# Patient Record
Sex: FEMALE | Race: WHITE | NOT HISPANIC OR LATINO | Employment: FULL TIME | URBAN - METROPOLITAN AREA
[De-identification: names, ages, dates, MRNs, and addresses within clinical notes are randomized per-mention and may not be internally consistent; named-entity substitution may affect disease eponyms.]

---

## 2017-02-15 ENCOUNTER — GENERIC CONVERSION - ENCOUNTER (OUTPATIENT)
Dept: OTHER | Facility: OTHER | Age: 22
End: 2017-02-15

## 2017-04-02 ENCOUNTER — HOSPITAL ENCOUNTER (OUTPATIENT)
Facility: HOSPITAL | Age: 22
Discharge: HOME | End: 2017-04-02
Attending: OBSTETRICS & GYNECOLOGY | Admitting: OBSTETRICS & GYNECOLOGY
Payer: COMMERCIAL

## 2017-04-02 VITALS — TEMPERATURE: 98 F | RESPIRATION RATE: 16 BRPM

## 2017-04-02 DIAGNOSIS — Z3A.26 26 WEEKS GESTATION OF PREGNANCY: ICD-10-CM

## 2017-04-02 PROCEDURE — 99202 OFFICE O/P NEW SF 15 MIN: CPT

## 2017-04-02 RX ORDER — DIPHENOXYLATE HYDROCHLORIDE AND ATROPINE SULFATE 2.5; .025 MG/1; MG/1
1 TABLET ORAL DAILY
COMMUNITY
End: 2019-03-06 | Stop reason: ALTCHOICE

## 2017-05-09 ENCOUNTER — OFFICE VISIT (OUTPATIENT)
Dept: URGENT CARE | Facility: MEDICAL CENTER | Age: 22
End: 2017-05-09
Payer: COMMERCIAL

## 2017-05-09 DIAGNOSIS — J00 ACUTE NASOPHARYNGITIS (COMMON COLD): ICD-10-CM

## 2017-05-09 PROCEDURE — G0382 LEV 3 HOSP TYPE B ED VISIT: HCPCS

## 2017-05-09 PROCEDURE — 99283 EMERGENCY DEPT VISIT LOW MDM: CPT

## 2017-05-10 ENCOUNTER — APPOINTMENT (OUTPATIENT)
Dept: LAB | Facility: HOSPITAL | Age: 22
End: 2017-05-10
Attending: FAMILY MEDICINE
Payer: COMMERCIAL

## 2017-05-10 ENCOUNTER — ALLSCRIPTS OFFICE VISIT (OUTPATIENT)
Dept: OTHER | Facility: OTHER | Age: 22
End: 2017-05-10

## 2017-05-10 DIAGNOSIS — J00 ACUTE NASOPHARYNGITIS (COMMON COLD): ICD-10-CM

## 2017-05-10 LAB — S PYO AG THROAT QL: NEGATIVE

## 2017-05-10 PROCEDURE — 87070 CULTURE OTHR SPECIMN AEROBIC: CPT

## 2017-05-12 LAB — BACTERIA THROAT CULT: NORMAL

## 2017-06-08 ENCOUNTER — LAB REQUISITION (OUTPATIENT)
Dept: LAB | Facility: HOSPITAL | Age: 22
End: 2017-06-08
Payer: COMMERCIAL

## 2017-06-08 DIAGNOSIS — Z34.93 ENCOUNTER FOR SUPERVISION OF NORMAL PREGNANCY IN THIRD TRIMESTER: ICD-10-CM

## 2017-06-08 PROCEDURE — 87653 STREP B DNA AMP PROBE: CPT | Performed by: OBSTETRICS & GYNECOLOGY

## 2017-06-09 LAB — GP B STREP DNA SPEC QL NAA+PROBE: ABNORMAL

## 2017-06-28 ENCOUNTER — HOSPITAL ENCOUNTER (OUTPATIENT)
Facility: HOSPITAL | Age: 22
Discharge: HOME | End: 2017-06-28
Attending: OBSTETRICS & GYNECOLOGY | Admitting: OBSTETRICS & GYNECOLOGY
Payer: COMMERCIAL

## 2017-06-28 VITALS
WEIGHT: 149 LBS | TEMPERATURE: 98.3 F | BODY MASS INDEX: 28.13 KG/M2 | RESPIRATION RATE: 20 BRPM | SYSTOLIC BLOOD PRESSURE: 105 MMHG | DIASTOLIC BLOOD PRESSURE: 73 MMHG | HEIGHT: 61 IN | HEART RATE: 105 BPM

## 2017-06-28 DIAGNOSIS — O26.899 ABDOMINAL PAIN AFFECTING PREGNANCY: ICD-10-CM

## 2017-06-28 DIAGNOSIS — R10.9 ABDOMINAL PAIN AFFECTING PREGNANCY: ICD-10-CM

## 2017-06-28 PROCEDURE — 99212 OFFICE O/P EST SF 10 MIN: CPT

## 2017-06-28 RX ORDER — VALACYCLOVIR HYDROCHLORIDE 500 MG/1
500 TABLET, FILM COATED ORAL 2 TIMES DAILY
COMMUNITY
End: 2019-08-24 | Stop reason: HOSPADM

## 2017-07-02 ENCOUNTER — ANESTHESIA EVENT (INPATIENT)
Dept: LABOR AND DELIVERY | Facility: HOSPITAL | Age: 22
End: 2017-07-02
Payer: COMMERCIAL

## 2017-07-02 ENCOUNTER — HOSPITAL ENCOUNTER (INPATIENT)
Facility: HOSPITAL | Age: 22
LOS: 2 days | Discharge: HOME | End: 2017-07-04
Attending: OBSTETRICS & GYNECOLOGY | Admitting: OBSTETRICS & GYNECOLOGY
Payer: COMMERCIAL

## 2017-07-02 ENCOUNTER — ANESTHESIA (INPATIENT)
Dept: LABOR AND DELIVERY | Facility: HOSPITAL | Age: 22
End: 2017-07-02
Payer: COMMERCIAL

## 2017-07-02 DIAGNOSIS — Z3A.39 39 WEEKS GESTATION OF PREGNANCY: ICD-10-CM

## 2017-07-02 DIAGNOSIS — O42.90 PROM (PREMATURE RUPTURE OF MEMBRANES): ICD-10-CM

## 2017-07-02 LAB
ABO GROUP BLD: NORMAL
BASE EXCESS BLDCOV CALC-SCNC: -3.7 MMOL/L (ref 1–9)
BASOPHILS # BLD AUTO: 0.03 THOUSANDS/ΜL (ref 0–0.1)
BASOPHILS NFR BLD AUTO: 0 % (ref 0–1)
BLD GP AB SCN SERPL QL: NEGATIVE
EOSINOPHIL # BLD AUTO: 0.12 THOUSAND/ΜL (ref 0–0.61)
EOSINOPHIL NFR BLD AUTO: 1 % (ref 0–6)
ERYTHROCYTE [DISTWIDTH] IN BLOOD BY AUTOMATED COUNT: 14.6 % (ref 11.6–15.1)
HCO3 BLDCOV-SCNC: 22.3 MMOL/L (ref 12.2–28.6)
HCT VFR BLD AUTO: 38.6 % (ref 34.8–46.1)
HGB BLD-MCNC: 13.4 G/DL (ref 11.5–15.4)
LYMPHOCYTES # BLD AUTO: 2.62 THOUSANDS/ΜL (ref 0.6–4.47)
LYMPHOCYTES NFR BLD AUTO: 15 % (ref 14–44)
MCH RBC QN AUTO: 30.7 PG (ref 26.8–34.3)
MCHC RBC AUTO-ENTMCNC: 34.7 G/DL (ref 31.4–37.4)
MCV RBC AUTO: 89 FL (ref 82–98)
MONOCYTES # BLD AUTO: 1.12 THOUSAND/ΜL (ref 0.17–1.22)
MONOCYTES NFR BLD AUTO: 6 % (ref 4–12)
NEUTROPHILS # BLD AUTO: 13.76 THOUSANDS/ΜL (ref 1.85–7.62)
NEUTS SEG NFR BLD AUTO: 78 % (ref 43–75)
NRBC BLD AUTO-RTO: 0 /100 WBCS
OXYHGB MFR BLDCOV: 55.3 %
PCO2 BLDCOV: 43.7 MM HG (ref 27–43)
PH BLDCOV: 7.33 [PH] (ref 7.19–7.49)
PLATELET # BLD AUTO: 244 THOUSANDS/UL (ref 149–390)
PMV BLD AUTO: 11.2 FL (ref 8.9–12.7)
PO2 BLDCOV: 24.6 MM HG (ref 15–45)
RBC # BLD AUTO: 4.36 MILLION/UL (ref 3.81–5.12)
RH BLD: POSITIVE
SAO2 % BLDCOV: 16.4 ML/DL
SPECIMEN EXPIRATION DATE: NORMAL
WBC # BLD AUTO: 17.65 THOUSAND/UL (ref 4.31–10.16)

## 2017-07-02 PROCEDURE — 86900 BLOOD TYPING SEROLOGIC ABO: CPT | Performed by: OBSTETRICS & GYNECOLOGY

## 2017-07-02 PROCEDURE — 0UQG7ZZ REPAIR VAGINA, VIA NATURAL OR ARTIFICIAL OPENING: ICD-10-PCS | Performed by: OBSTETRICS & GYNECOLOGY

## 2017-07-02 PROCEDURE — 86592 SYPHILIS TEST NON-TREP QUAL: CPT | Performed by: OBSTETRICS & GYNECOLOGY

## 2017-07-02 PROCEDURE — 99213 OFFICE O/P EST LOW 20 MIN: CPT

## 2017-07-02 PROCEDURE — 82805 BLOOD GASES W/O2 SATURATION: CPT | Performed by: OBSTETRICS & GYNECOLOGY

## 2017-07-02 PROCEDURE — 86850 RBC ANTIBODY SCREEN: CPT | Performed by: OBSTETRICS & GYNECOLOGY

## 2017-07-02 PROCEDURE — 4A1HXCZ MONITORING OF PRODUCTS OF CONCEPTION, CARDIAC RATE, EXTERNAL APPROACH: ICD-10-PCS | Performed by: OBSTETRICS & GYNECOLOGY

## 2017-07-02 PROCEDURE — 85025 COMPLETE CBC W/AUTO DIFF WBC: CPT | Performed by: OBSTETRICS & GYNECOLOGY

## 2017-07-02 PROCEDURE — 86901 BLOOD TYPING SEROLOGIC RH(D): CPT | Performed by: OBSTETRICS & GYNECOLOGY

## 2017-07-02 RX ORDER — OXYTOCIN/RINGER'S LACTATE 30/500 ML
PLASTIC BAG, INJECTION (ML) INTRAVENOUS
Status: COMPLETED
Start: 2017-07-02 | End: 2017-07-03

## 2017-07-02 RX ORDER — BUTORPHANOL TARTRATE 1 MG/ML
1 INJECTION, SOLUTION INTRAMUSCULAR; INTRAVENOUS ONCE
Status: COMPLETED | OUTPATIENT
Start: 2017-07-02 | End: 2017-07-02

## 2017-07-02 RX ORDER — ROPIVACAINE HYDROCHLORIDE 2 MG/ML
INJECTION, SOLUTION EPIDURAL; INFILTRATION; PERINEURAL AS NEEDED
Status: DISCONTINUED | OUTPATIENT
Start: 2017-07-02 | End: 2017-07-03 | Stop reason: SURG

## 2017-07-02 RX ORDER — VALACYCLOVIR HYDROCHLORIDE 500 MG/1
500 TABLET, FILM COATED ORAL 2 TIMES DAILY
Status: DISCONTINUED | OUTPATIENT
Start: 2017-07-02 | End: 2017-07-03

## 2017-07-02 RX ORDER — SODIUM CHLORIDE, SODIUM LACTATE, POTASSIUM CHLORIDE, CALCIUM CHLORIDE 600; 310; 30; 20 MG/100ML; MG/100ML; MG/100ML; MG/100ML
125 INJECTION, SOLUTION INTRAVENOUS CONTINUOUS
Status: DISCONTINUED | OUTPATIENT
Start: 2017-07-02 | End: 2017-07-03

## 2017-07-02 RX ORDER — ONDANSETRON 2 MG/ML
4 INJECTION INTRAMUSCULAR; INTRAVENOUS EVERY 6 HOURS PRN
Status: DISCONTINUED | OUTPATIENT
Start: 2017-07-02 | End: 2017-07-03

## 2017-07-02 RX ORDER — PROMETHAZINE HYDROCHLORIDE 25 MG/ML
12.5 INJECTION, SOLUTION INTRAMUSCULAR; INTRAVENOUS ONCE
Status: COMPLETED | OUTPATIENT
Start: 2017-07-02 | End: 2017-07-02

## 2017-07-02 RX ADMIN — PENICILLIN G POTASSIUM 5 MILLION UNITS: 5000000 POWDER, FOR SOLUTION INTRAMUSCULAR; INTRAPLEURAL; INTRATHECAL; INTRAVENOUS at 07:25

## 2017-07-02 RX ADMIN — ROPIVACAINE HYDROCHLORIDE 5 ML: 2 INJECTION, SOLUTION EPIDURAL; INFILTRATION at 12:54

## 2017-07-02 RX ADMIN — VALACYCLOVIR HYDROCHLORIDE 500 MG: 500 TABLET, FILM COATED ORAL at 08:45

## 2017-07-02 RX ADMIN — PROMETHAZINE HYDROCHLORIDE 12.5 MG: 25 INJECTION INTRAMUSCULAR; INTRAVENOUS at 09:10

## 2017-07-02 RX ADMIN — ROPIVACAINE HYDROCHLORIDE 5 ML: 2 INJECTION, SOLUTION EPIDURAL; INFILTRATION at 12:57

## 2017-07-02 RX ADMIN — BUTORPHANOL TARTRATE 1 MG: 1 INJECTION, SOLUTION INTRAMUSCULAR; INTRAVENOUS at 09:10

## 2017-07-02 RX ADMIN — SODIUM CHLORIDE 2.5 MILLION UNITS: 9 INJECTION, SOLUTION INTRAVENOUS at 15:38

## 2017-07-02 RX ADMIN — SODIUM CHLORIDE 2.5 MILLION UNITS: 9 INJECTION, SOLUTION INTRAVENOUS at 11:35

## 2017-07-02 RX ADMIN — SODIUM CHLORIDE, SODIUM LACTATE, POTASSIUM CHLORIDE, AND CALCIUM CHLORIDE 125 ML/HR: .6; .31; .03; .02 INJECTION, SOLUTION INTRAVENOUS at 09:10

## 2017-07-03 LAB — RPR SER QL: NORMAL

## 2017-07-03 RX ORDER — DIPHENHYDRAMINE HCL 25 MG
25 TABLET ORAL EVERY 6 HOURS PRN
Status: DISCONTINUED | OUTPATIENT
Start: 2017-07-03 | End: 2017-07-04 | Stop reason: HOSPADM

## 2017-07-03 RX ORDER — OXYTOCIN/RINGER'S LACTATE 30/500 ML
250 PLASTIC BAG, INJECTION (ML) INTRAVENOUS CONTINUOUS
Status: ACTIVE | OUTPATIENT
Start: 2017-07-03 | End: 2017-07-03

## 2017-07-03 RX ORDER — CALCIUM CARBONATE 200(500)MG
1000 TABLET,CHEWABLE ORAL DAILY PRN
Status: DISCONTINUED | OUTPATIENT
Start: 2017-07-03 | End: 2017-07-04 | Stop reason: HOSPADM

## 2017-07-03 RX ORDER — DOCUSATE SODIUM 100 MG/1
100 CAPSULE, LIQUID FILLED ORAL 2 TIMES DAILY
Status: DISCONTINUED | OUTPATIENT
Start: 2017-07-03 | End: 2017-07-04 | Stop reason: HOSPADM

## 2017-07-03 RX ORDER — ONDANSETRON 2 MG/ML
4 INJECTION INTRAMUSCULAR; INTRAVENOUS EVERY 8 HOURS PRN
Status: DISCONTINUED | OUTPATIENT
Start: 2017-07-03 | End: 2017-07-04 | Stop reason: HOSPADM

## 2017-07-03 RX ORDER — IBUPROFEN 600 MG/1
600 TABLET ORAL EVERY 6 HOURS PRN
Status: DISCONTINUED | OUTPATIENT
Start: 2017-07-03 | End: 2017-07-04 | Stop reason: HOSPADM

## 2017-07-03 RX ORDER — OXYCODONE HYDROCHLORIDE AND ACETAMINOPHEN 5; 325 MG/1; MG/1
2 TABLET ORAL EVERY 4 HOURS PRN
Status: DISCONTINUED | OUTPATIENT
Start: 2017-07-03 | End: 2017-07-04 | Stop reason: HOSPADM

## 2017-07-03 RX ORDER — DIAPER,BRIEF,INFANT-TODD,DISP
1 EACH MISCELLANEOUS AS NEEDED
Status: DISCONTINUED | OUTPATIENT
Start: 2017-07-03 | End: 2017-07-04 | Stop reason: HOSPADM

## 2017-07-03 RX ORDER — SIMETHICONE 80 MG
80 TABLET,CHEWABLE ORAL 4 TIMES DAILY PRN
Status: DISCONTINUED | OUTPATIENT
Start: 2017-07-03 | End: 2017-07-04 | Stop reason: HOSPADM

## 2017-07-03 RX ORDER — ACETAMINOPHEN 325 MG/1
650 TABLET ORAL EVERY 6 HOURS PRN
Status: DISCONTINUED | OUTPATIENT
Start: 2017-07-03 | End: 2017-07-04 | Stop reason: HOSPADM

## 2017-07-03 RX ORDER — OXYCODONE HYDROCHLORIDE AND ACETAMINOPHEN 5; 325 MG/1; MG/1
1 TABLET ORAL EVERY 4 HOURS PRN
Status: DISCONTINUED | OUTPATIENT
Start: 2017-07-03 | End: 2017-07-04 | Stop reason: HOSPADM

## 2017-07-03 RX ADMIN — IBUPROFEN 600 MG: 600 TABLET, FILM COATED ORAL at 00:50

## 2017-07-03 RX ADMIN — BENZOCAINE AND MENTHOL: 20; .5 SPRAY TOPICAL at 21:00

## 2017-07-03 RX ADMIN — WITCH HAZEL 1 PAD: 500 SOLUTION RECTAL; TOPICAL at 00:50

## 2017-07-03 RX ADMIN — IBUPROFEN 600 MG: 600 TABLET, FILM COATED ORAL at 15:29

## 2017-07-03 RX ADMIN — DOCUSATE SODIUM 100 MG: 100 CAPSULE, LIQUID FILLED ORAL at 08:56

## 2017-07-03 RX ADMIN — Medication 250 MILLI-UNITS/MIN: at 00:54

## 2017-07-03 RX ADMIN — DOCUSATE SODIUM 100 MG: 100 CAPSULE, LIQUID FILLED ORAL at 18:18

## 2017-07-03 RX ADMIN — IBUPROFEN 600 MG: 600 TABLET, FILM COATED ORAL at 08:56

## 2017-07-03 RX ADMIN — HYDROCORTISONE 1 APPLICATION: 1 CREAM TOPICAL at 00:50

## 2017-07-03 RX ADMIN — BENZOCAINE AND MENTHOL: 20; .5 SPRAY TOPICAL at 00:51

## 2017-07-03 RX ADMIN — OXYCODONE HYDROCHLORIDE AND ACETAMINOPHEN 2 TABLET: 5; 325 TABLET ORAL at 00:50

## 2017-07-04 VITALS
RESPIRATION RATE: 17 BRPM | DIASTOLIC BLOOD PRESSURE: 63 MMHG | SYSTOLIC BLOOD PRESSURE: 88 MMHG | TEMPERATURE: 98.4 F | HEART RATE: 97 BPM | BODY MASS INDEX: 28.34 KG/M2 | WEIGHT: 150 LBS | OXYGEN SATURATION: 100 %

## 2017-07-04 RX ORDER — IBUPROFEN 600 MG/1
600 TABLET ORAL EVERY 6 HOURS PRN
Qty: 30 TABLET | Refills: 0
Start: 2017-07-04 | End: 2019-03-06 | Stop reason: ALTCHOICE

## 2017-07-04 RX ORDER — DOCUSATE SODIUM 100 MG/1
100 CAPSULE, LIQUID FILLED ORAL 2 TIMES DAILY
Qty: 10 CAPSULE | Refills: 0
Start: 2017-07-04 | End: 2019-03-06 | Stop reason: ALTCHOICE

## 2017-07-04 RX ADMIN — DOCUSATE SODIUM 100 MG: 100 CAPSULE, LIQUID FILLED ORAL at 08:47

## 2017-07-04 RX ADMIN — OXYCODONE HYDROCHLORIDE AND ACETAMINOPHEN 2 TABLET: 5; 325 TABLET ORAL at 00:00

## 2017-07-06 ENCOUNTER — TELEPHONE (OUTPATIENT)
Dept: LABOR AND DELIVERY | Facility: HOSPITAL | Age: 22
End: 2017-07-06

## 2017-07-10 ENCOUNTER — TELEPHONE (OUTPATIENT)
Dept: LABOR AND DELIVERY | Facility: HOSPITAL | Age: 22
End: 2017-07-10

## 2017-07-10 LAB — PLACENTA IN STORAGE: NORMAL

## 2017-10-23 ENCOUNTER — ALLSCRIPTS OFFICE VISIT (OUTPATIENT)
Dept: OTHER | Facility: OTHER | Age: 22
End: 2017-10-23

## 2017-10-23 ENCOUNTER — LAB REQUISITION (OUTPATIENT)
Dept: LAB | Facility: HOSPITAL | Age: 22
End: 2017-10-23
Payer: COMMERCIAL

## 2017-10-23 DIAGNOSIS — J02.9 ACUTE PHARYNGITIS: ICD-10-CM

## 2017-10-23 LAB — S PYO AG THROAT QL: NEGATIVE

## 2017-10-23 PROCEDURE — 87070 CULTURE OTHR SPECIMN AEROBIC: CPT | Performed by: FAMILY MEDICINE

## 2017-10-24 NOTE — PROGRESS NOTES
Assessment  1  Never a smoker   2  Acute upper respiratory infection (465 9) (J06 9)    Plan  Acute upper respiratory infection    · Drink plenty of fluids ; Status:Complete;   Done: [id number] 10:49PM   · Gargle with warm salt water for 5 minutes every 4 hours ; Status:Complete;   Done:  31YIH8167 10:49PM  Sore throat    · (1) THROAT CULTURE (CULTURE, UPPER RESPIRATORY); Status: In Progress -  Specimen/Data Collected,Retrospective By Protocol Authorization;   Done: 52SAG1542   · Rapid StrepA- POC; Source:Throat; Status:Resulted - Requires Verification,Retrospective  By Protocol Authorization;   Done: 67YCJ6995 04:19PM    Discussion/Summary    URI - rapid strep test was negative, will obtain throat culture and call with results if abnormal, rest, increase fluid intake, gargle with salt water, take Tylenol as needed, f/u n the office if symptoms persist or worsen  The treatment plan was reviewed with the patient/guardian  The patient/guardian understands and agrees with the treatment plan      Chief Complaint  Pt presents in the office today with c/o a sore throat and ear pain since this morning;   due 12/23/2018      History of Present Illness  HPI: Pt presents today with c/o sore throat, nasal congestion and ear pressure for 2 days, she has been feeling achy, but no fever or chills, no cough, no purulent nasal d/c, no chest pain or SOB  Pt has tried no OTC medications  Review of Systems    Constitutional: feeling tired, but-- no fever,-- not feeling poorly-- and-- no chills  ENT: sore throat-- and-- nasal discharge, but-- no earache,-- no nosebleeds-- and-- no hoarseness  Cardiovascular: no complaints of slow or fast heart rate, no chest pain, no palpitations, no leg claudication or lower extremity edema  Respiratory: no complaints of shortness of breath, no wheezing, no dyspnea on exertion, no orthopnea or PND     Gastrointestinal: no complaints of abdominal pain, no constipation, no nausea or diarrhea, no vomiting, no bloody stools  Active Problems  1  Acute upper respiratory infection (465 9) (J06 9)   2  Cold (460) (J00)   3  Sore throat (462) (J02 9)    Family History  Mother    1  Denied: Family history of substance abuse   2  Family history of Healthy adult   3  No family history of mental disorder  Father    4  Family history of Cardiac aneurysm   5  Denied: Family history of substance abuse   6  Family history of Heart problem   7  No family history of mental disorder  Brother    8  Family history of chronic lymphoid leukemia (V16 6) (Z80 6)  Paternal Grandfather    5  Family history of Cardiac aneurysm  Family History    10  Family history of Throat cancer  Family History Reviewed: The family history was reviewed and updated today  Social History   · Never a smoker   · No alcohol use   · No illicit drug use   · Occasional caffeine consumption  The social history was reviewed and updated today  Surgical History  1  Denied: History Of Prior Surgery    Current Meds   1  Prenatal TABS; Therapy: (Recorded:06Cco5666) to Recorded    Allergies  1  No Known Drug Allergies    Vitals   Recorded: 23Oct2017 04:08PM   Temperature 98 4 F   Heart Rate 80   Respiration 16   Systolic 917   Diastolic 78   Height 5 ft 0 25 in   Weight 129 lb 8 0 oz   BMI Calculated 25 08   BSA Calculated 1 56     Physical Exam    Constitutional   General appearance: No acute distress, well appearing and well nourished  Ears, Nose, Mouth, and Throat   Otoscopic examination: Tympanic membranes translucent with normal light reflex  Canals patent without erythema  Nasal mucosa, septum, and turbinates: Abnormal   no nasal discharge  The bilateral nasal mucosa was edematous-- and-- red  Oropharynx: Abnormal   The posterior pharynx was erythematous, but-- did not have an exudate  Pulmonary   Respiratory effort: No increased work of breathing or signs of respiratory distress      Auscultation of lungs: Clear to auscultation  Cardiovascular   Auscultation of heart: Normal rate and rhythm, normal S1 and S2, without murmurs  Lymphatic   Palpation of lymph nodes in neck: No lymphadenopathy           Results/Data  Rapid StrepA- POC [id number] 04:19PM Debra Wise     Test Name Result Flag Reference   Rapid Strep Negative                     Signatures   Electronically signed by : REBECA Rutledge ; Oct 23 2017 10:53PM EST                       (Author)

## 2017-10-25 LAB — BACTERIA THROAT CULT: NORMAL

## 2017-11-10 ENCOUNTER — ALLSCRIPTS OFFICE VISIT (OUTPATIENT)
Dept: OTHER | Facility: OTHER | Age: 22
End: 2017-11-10

## 2017-11-11 NOTE — PROGRESS NOTES
Assessment    1  Never a smoker   2  Acute sinusitis (461 9) (J01 90)    Plan  Acute sinusitis    · Amoxicillin 500 MG Oral Tablet; TAKE 1 TABLET 3 TIMES DAILY  Productive cough    · PEAK FLOW- POC; Status:Complete;   Done: 20SUS9204 01:55PM    Discussion/Summary    Sinusitis - start Amoxil 1 tab 3 x a day for 10 days, Mucinex, Nasal saline Spray, Flonase nasal spray 2 sprays each nostril once daily  Fluids and REST   due 12/2018  as needed  Possible side effects of new medications were reviewed with the patient/guardian today  The treatment plan was reviewed with the patient/guardian  The patient/guardian understands and agrees with the treatment plan      Chief Complaint  Pt presents to the office with c/o her cough and sinus congestion back after antibiotic txdue 12/23/2018      History of Present Illness  HPI: Patient here because was seen on OCt 23 for sore throat and was told it was viral, gargle warm salt water, fluids and rest  Seems to have been getting progressively worse moved into congestion, coughing, starting 3 days ago coughing up green mucus, nasal congestion with green rhinorrhea, ears with pressure, throat sore again  With waking feels pressure in head  Warm shower helps relieve pressure  Denies fever, chills  Taking Robitussin CF with some relief of cough  month old baby at home, patient has a very irregular sleep schedule, run down  Past Medical History  Active Problems And Past Medical History Reviewed: The active problems and past medical history were reviewed and updated today  Family History  Mother    1  Denied: Family history of substance abuse   2  Family history of Healthy adult   3  No family history of mental disorder  Father    4  Family history of Cardiac aneurysm   5  Denied: Family history of substance abuse   6  Family history of Heart problem   7  No family history of mental disorder  Brother    8   Family history of chronic lymphoid leukemia (V16 6) (Z80 6)  Paternal Grandfather    9  Family history of Cardiac aneurysm  Family History    10  Family history of Throat cancer  Family History Reviewed: The family history was reviewed and updated today  Social History   · Never a smoker   · No alcohol use   · No illicit drug use   · Occasional caffeine consumption  The social history was reviewed and updated today  The social history was reviewed and is unchanged  Surgical History    1  Denied: History Of Prior Surgery  Surgical History Reviewed: The surgical history was reviewed and updated today  Current Meds   1  Prenatal TABS; Therapy: (Recorded:54Hax3253) to Recorded    The medication list was reviewed and updated today  Allergies  1  No Known Drug Allergies  2  Seasonal    Vitals   Recorded: 05UUA3164 01:52PM   Temperature 98 4 F, Oral   Heart Rate 76, L Radial   Pulse Quality Normal, L Radial   Respiration Quality Normal   Respiration 16   Systolic 815, LUE, Sitting   Diastolic 68, LUE, Sitting   Height 5 ft 0 5 in   Weight 129 lb 11 2 oz   BMI Calculated 24 91   BSA Calculated 1 56       Physical Exam   Constitutional  General appearance: No acute distress, well appearing and well nourished  Eyes  Conjunctiva and lids: No swelling, erythema or discharge  Pupils and irises: Equal, round and reactive to light  Ears, Nose, Mouth, and Throat  External inspection of ears and nose: Normal    Otoscopic examination: Abnormal  -- fluids b/l  Nasal mucosa, septum, and turbinates: Abnormal  -- turbinates inflamed, congested  Oropharynx: Abnormal  -- minimal erythema  Pulmonary  Respiratory effort: No increased work of breathing or signs of respiratory distress  Auscultation of lungs: Clear to auscultation  Cardiovascular  Palpation of heart: Normal PMI, no thrills  Auscultation of heart: Normal rate and rhythm, normal S1 and S2, without murmurs  Lymphatic  Palpation of lymph nodes in neck: Abnormal  -- b/l submandibular nodes enlarged  Psychiatric  Orientation to person, place, and time: Normal    Mood and affect: Normal          Results/Data  PEAK FLOW- POC [id number] 01:55PM Noa Manners     Test Name Result Flag Reference   Peak Flow 310/390/390         10/23 rapid, t/c negative   Signatures   Electronically signed by : Nicholas Waldron [facility]; Nov 10 2017  2:43PM EST                       (Author)    Electronically signed by : Lzi Rodriguez DO; Nov 10 2017  3:17PM EST

## 2018-01-12 ENCOUNTER — OFFICE VISIT (OUTPATIENT)
Dept: URGENT CARE | Facility: MEDICAL CENTER | Age: 23
End: 2018-01-12
Payer: COMMERCIAL

## 2018-01-12 DIAGNOSIS — J02.9 ACUTE PHARYNGITIS: ICD-10-CM

## 2018-01-12 LAB — S PYO AG THROAT QL: NEGATIVE

## 2018-01-12 PROCEDURE — G0382 LEV 3 HOSP TYPE B ED VISIT: HCPCS

## 2018-01-12 PROCEDURE — 87430 STREP A AG IA: CPT

## 2018-01-13 ENCOUNTER — APPOINTMENT (OUTPATIENT)
Dept: LAB | Facility: HOSPITAL | Age: 23
End: 2018-01-13
Payer: COMMERCIAL

## 2018-01-13 VITALS
HEIGHT: 61 IN | BODY MASS INDEX: 28.09 KG/M2 | TEMPERATURE: 97.4 F | HEART RATE: 86 BPM | DIASTOLIC BLOOD PRESSURE: 64 MMHG | SYSTOLIC BLOOD PRESSURE: 126 MMHG | WEIGHT: 148.8 LBS | RESPIRATION RATE: 18 BRPM

## 2018-01-13 VITALS
BODY MASS INDEX: 25.42 KG/M2 | TEMPERATURE: 98.4 F | WEIGHT: 129.5 LBS | HEART RATE: 80 BPM | SYSTOLIC BLOOD PRESSURE: 110 MMHG | DIASTOLIC BLOOD PRESSURE: 78 MMHG | RESPIRATION RATE: 16 BRPM | HEIGHT: 60 IN

## 2018-01-13 DIAGNOSIS — J02.9 ACUTE PHARYNGITIS: ICD-10-CM

## 2018-01-13 PROCEDURE — 87070 CULTURE OTHR SPECIMN AEROBIC: CPT

## 2018-01-14 VITALS
HEART RATE: 76 BPM | HEIGHT: 61 IN | BODY MASS INDEX: 24.49 KG/M2 | TEMPERATURE: 98.4 F | DIASTOLIC BLOOD PRESSURE: 68 MMHG | WEIGHT: 129.7 LBS | RESPIRATION RATE: 16 BRPM | SYSTOLIC BLOOD PRESSURE: 118 MMHG

## 2018-01-14 NOTE — PROGRESS NOTES
Assessment   1  Acute pharyngitis (462) (J02 9)    Plan   Acute pharyngitis    · Azithromycin 250 MG Oral Tablet; TAKE 2 TABLETS ON DAY 1 THEN TAKE 1    TABLET A DAY FOR 4 DAYS   · Bromfed DM 30-2-10 MG/5ML Oral Syrup; SWALLOW 10 ML Every 6 hours PRN For    cough/congestion   · Gargle with warm salt water for 5 minutes every 4 hours ; Status:Complete;   Done:    43ORD8114   · Good hand washing is one of the best ways to control the spread of germs ;    Status:Complete;   Done: 72PON0777   · Stay home from work or school until your condition is improved ; Status:Complete;      Done: 36DLU5989   · Take steps to prevent passing germs to others ; Status:Complete;   Done: 21URA1915   · The following home treatments may soothe a sore throat ; Status:Complete;   Done:    70AFU0872   · Seek Immediate Medical Attention if: You become dehydrated ; Status:Complete;   Done:    28BAG5373   · Seek Immediate Medical Attention if: You get a severe headache that seems different    from your usual ones ; Status:Complete;   Done: 31IWW4008   · Seek Immediate Medical Attention if: Your swallowing difficulty is worse ;    Status:Complete;   Done: 77EII2397    Discussion/Summary   Discussion Summary:    Increase fluids and rest  Warm saltwater gargles, hot tea with honey and lemon, throat lozenges, Chloraseptic spray as needed for sore throat relief  Tylenol and Advil as needed for fever and chills  Monitor for severe worsening of current symptoms, difficulty breathing, swallowing, uncontrollable fever or chills  With these symptoms follow up with PCP or ER immediately  Otherwise follow-up with PCP in 3-5 days if symptoms are not improving  Medication Side Effects Reviewed: Possible side effects of new medications were reviewed with the patient/guardian today  Understands and agrees with treatment plan: The treatment plan was reviewed with the patient/guardian   The patient/guardian understands and agrees with the treatment plan Counseling Documentation With Imm: The patient was counseled regarding instructions for management  Follow Up Instructions: Follow Up with your Primary Care Provider in 3-5 days  If your symptoms worsen, go to the nearest [place] [address] Emergency Department  Chief Complaint   Chief Complaint Free Text Note Form: Sore throat x 2 days  History of Present Illness   HPI: 24-year-old female presents for evaluation of sore throat x2 days  Patient states that it started a tickle back throat has progressively gotten worse  She also has been having left-sided ear pain greater than the right-sided ear pain  She states her sore throat is currently at a 5/10  She states she has not had any difficulty eating or swallowing liquids  She has a 10month-old infant home dose recently diagnosed with RSV and is concerned that she might get him sick as well  She states she is not currently breastfeeding  She denies any fevers, chills, chest congestion, difficulty breathing, shortness of breath, abdominal pain, nausea/vomiting / diarrhea  Hospital Based Practices Required Assessment:      Pain Assessment      the patient states they have pain  (on a scale of 0 to 10, the patient rates the pain at at times ranging as high as 5-6 )      Abuse And Domestic Violence Screen       Yes, the patient is safe at home  -- The patient states no one is hurting them  Depression And Suicide Screen  No, the patient has not had thoughts of hurting themself  No, the patient has not felt depressed in the past 7 days  Review of Systems   Focused-Female:      Constitutional: as noted in HPI       ENT: as noted in HPI  Cardiovascular: as noted in HPI  Respiratory: as noted in HPI  ROS Reviewed:    ROS reviewed  Active Problems   1  Acute sinusitis (461 9) (J01 90)   2  Productive cough (786 2) (R05)    Past Medical History   Active Problems And Past Medical History Reviewed:     The active problems and past medical history were reviewed and updated today  Family History   Mother    1  Denied: Family history of substance abuse   2  Family history of Healthy adult   3  No family history of mental disorder  Father    4  Family history of Cardiac aneurysm   5  Denied: Family history of substance abuse   6  Family history of Heart problem   7  No family history of mental disorder  Brother    8  Family history of chronic lymphoid leukemia (V16 6) (Z80 6)  Paternal Grandfather    5  Family history of Cardiac aneurysm  Family History    10  Family history of Throat cancer  Family History Reviewed: The family history was reviewed and updated today  Social History    · Never a smoker   · No alcohol use   · No illicit drug use   · Occasional caffeine consumption  Social History Reviewed: The social history was reviewed and updated today  The social history was reviewed and is unchanged  Surgical History   1  Denied: History Of Prior Surgery  Surgical History Reviewed: The surgical history was reviewed and updated today  Current Meds    1  Amoxicillin 500 MG Oral Tablet; TAKE 1 TABLET 3 TIMES DAILY; Therapy: 00MDG0616 to (Evaluate:24Nov2017)  Requested for: 26QJJ1635; Last     Rx:10Nov2017 Ordered   2  Prenatal TABS; Therapy: (Recorded:95Pnv3028) to Recorded  Medication List Reviewed: The medication list was reviewed and updated today  Allergies   1  No Known Drug Allergies  2  Seasonal    Vitals   Signs   Recorded: 12Jan2018 08:20PM   Temperature: 97 6 F  Heart Rate: 70  Respiration: 18  Systolic: 134  Diastolic: 64  Height: 5 ft 0 5 in  Weight: 129 lb   BMI Calculated: 24 78  BSA Calculated: 1 56  O2 Saturation: 97  Pain Scale: 6    Physical Exam        Constitutional      General appearance: No acute distress, well appearing and well nourished  Eyes      Conjunctiva and lids: No swelling, erythema or discharge  Pupils and irises: Equal, round and reactive to light  Ears, Nose, Mouth, and Throat      External inspection of ears and nose: Normal        Otoscopic examination: Tympanic membranes translucent with normal light reflex  Canals patent without erythema  Nasal mucosa, septum, and turbinates: Abnormal  -- Bilateral turbinate hypertrophy  Oropharynx: Abnormal  -- Erythematous in the posterior oropharynx  Visible exudates in the left tonsil  Pulmonary      Respiratory effort: No increased work of breathing or signs of respiratory distress  Auscultation of lungs: Clear to auscultation  Cardiovascular      Auscultation of heart: Normal rate and rhythm, normal S1 and S2, without murmurs  Lymphatic      Palpation of lymph nodes in neck: Abnormal   bilateral anterior cervical node enlargement  Psychiatric      Orientation to person, place, and time: Normal        Mood and affect: Normal        Results/Data   Lab Studies Reviewed: In office strep was negative        Signatures    Electronically signed by : RAMYA Saunders; Jan 12 2018  8:34PM EST                       (Author)     Electronically signed by : REBECA Hutson ; Jan 13 2018 10:26AM EST

## 2018-01-15 LAB — BACTERIA THROAT CULT: NORMAL

## 2018-01-23 VITALS
BODY MASS INDEX: 24.35 KG/M2 | TEMPERATURE: 97.6 F | SYSTOLIC BLOOD PRESSURE: 114 MMHG | HEART RATE: 70 BPM | DIASTOLIC BLOOD PRESSURE: 64 MMHG | WEIGHT: 129 LBS | RESPIRATION RATE: 18 BRPM | HEIGHT: 61 IN | OXYGEN SATURATION: 97 %

## 2018-04-26 ENCOUNTER — OFFICE VISIT (OUTPATIENT)
Dept: URGENT CARE | Facility: MEDICAL CENTER | Age: 23
End: 2018-04-26

## 2018-04-26 VITALS
DIASTOLIC BLOOD PRESSURE: 79 MMHG | OXYGEN SATURATION: 99 % | SYSTOLIC BLOOD PRESSURE: 121 MMHG | WEIGHT: 127 LBS | HEART RATE: 102 BPM | BODY MASS INDEX: 23.37 KG/M2 | RESPIRATION RATE: 16 BRPM | HEIGHT: 62 IN | TEMPERATURE: 98.7 F

## 2018-04-26 DIAGNOSIS — J02.0 STREP PHARYNGITIS: Primary | ICD-10-CM

## 2018-04-26 LAB — S PYO AG THROAT QL: POSITIVE

## 2018-04-26 PROCEDURE — 99203 OFFICE O/P NEW LOW 30 MIN: CPT | Performed by: PHYSICIAN ASSISTANT

## 2018-04-26 PROCEDURE — 87430 STREP A AG IA: CPT | Performed by: PHYSICIAN ASSISTANT

## 2018-04-26 PROCEDURE — S9088 SERVICES PROVIDED IN URGENT: HCPCS | Performed by: PHYSICIAN ASSISTANT

## 2018-04-26 RX ORDER — AMOXICILLIN 500 MG/1
500 CAPSULE ORAL EVERY 8 HOURS SCHEDULED
Qty: 21 CAPSULE | Refills: 0 | Status: SHIPPED | OUTPATIENT
Start: 2018-04-26 | End: 2018-05-03

## 2018-04-26 RX ORDER — FLUTICASONE PROPIONATE 50 MCG
1 SPRAY, SUSPENSION (ML) NASAL DAILY
Qty: 16 G | Refills: 0 | Status: SHIPPED | OUTPATIENT
Start: 2018-04-26 | End: 2019-10-31 | Stop reason: HOSPADM

## 2018-04-26 NOTE — LETTER
April 26, 2018 2    Patient: Farhad Riggins   YOB: 1995   Date of Visit: 4/26/2018       To Whom It May Concern: It is my medical opinion that Farhad Riggins may return to work on 4/27/2018  If you have any questions or concerns, please don't hesitate to call           Sincerely,        Gulshan Saul PA-C    CC: No Recipients

## 2018-04-26 NOTE — PROGRESS NOTES
[facility]        NAME: Myrtle Rock is a 23 y o  female  : 1995    MRN: [id number]  DATE: 2018  TIME: 3:12 PM    Assessment and Plan   Strep pharyngitis [J02 0]  1  Strep pharyngitis  POCT rapid strepA    amoxicillin (AMOXIL) 500 mg capsule    fluticasone (FLONASE) 50 mcg/act nasal spray         Patient Instructions       Take antibiotics as prescribed  Follow up with family practice this week  Go to ER if new or worsening symptoms occur  Chief Complaint     Chief Complaint   Patient presents with    Sore Throat     x 2 days and fatigue         History of Present Illness       22 yo F presents with 2 day history of sore throat and fatigue  Patient denies nausea vomiting or diarrhea  Denies fevers or chills  Denies change in voice, swelling of throat, or difficulty swallowing  Denies SOB  Patient has no sick contacts  No other symptoms  Review of Systems   Review of Systems   Constitutional: Positive for fatigue (Patient went to gym today and feels more tired than usual, but was able to complete workout)  Negative for activity change, chills, diaphoresis and fever  HENT: Positive for ear pain, sinus pain, sinus pressure and sore throat  Negative for congestion, rhinorrhea, trouble swallowing and voice change  Eyes: Negative  Respiratory: Negative for cough, chest tightness and shortness of breath  Cardiovascular: Negative for chest pain and palpitations  Gastrointestinal: Negative for diarrhea, nausea and vomiting  Endocrine: Negative  Genitourinary: Negative for dysuria  Musculoskeletal: Negative for back pain, myalgias and neck pain  Skin: Negative for pallor and rash  Allergic/Immunologic: Negative  Neurological: Negative for dizziness, syncope and headaches  Hematological: Negative  Psychiatric/Behavioral: Negative            Current Medications       Current Outpatient Prescriptions:     amoxicillin (AMOXIL) 500 mg capsule, Take 1 capsule (500 mg total) by mouth every 8 (eight) hours for 7 days, Disp: 21 capsule, Rfl: 0    benzocaine-menthol-lanolin-aloe (DERMOPLAST) 20-0 5 % topical spray, Apply 1 application topically 4 (four) times a day as needed for irritation, Disp: , Rfl: 0    docusate sodium (COLACE) 100 mg capsule, Take 1 capsule by mouth 2 (two) times a day, Disp: 10 capsule, Rfl: 0    fluticasone (FLONASE) 50 mcg/act nasal spray, 1 spray into each nostril daily, Disp: 16 g, Rfl: 0    ibuprofen (MOTRIN) 600 mg tablet, Take 1 tablet by mouth every 6 (six) hours as needed for mild pain, Disp: 30 tablet, Rfl: 0    multivitamin (THERAGRAN) TABS, Take 1 tablet by mouth daily, Disp: , Rfl:     valACYclovir (VALTREX) 500 mg tablet, Take 500 mg by mouth 2 (two) times a day, Disp: , Rfl:     witch hazel-glycerin (TUCKS) topical pad, Apply 1 pad topically as needed for irritation or hemorrhoids, Disp: 40 each, Rfl: 0    Current Allergies     Allergies as of 04/26/2018    (No Known Allergies)            The following portions of the patient's history were reviewed and updated as appropriate: allergies, current medications, past family history, past medical history, past social history, past surgical history and problem list      No past medical history on file  No past surgical history on file  Family History   Problem Relation Age of Onset    Aneurysm Father      cardiac    Heart disease Father      problem    Leukemia Brother      chronic lymphoid    Aneurysm Paternal Grandfather      cardiac     Throat cancer Family          Medications have been verified  Objective   /79   Pulse 102   Temp 98 7 °F (37 1 °C)   Resp 16   Ht 5' 2" (1 575 m)   Wt 57 6 kg (127 lb)   SpO2 99%   BMI 23 23 kg/m²        Physical Exam     Physical Exam   Constitutional: She is oriented to person, place, and time  She appears well-developed and well-nourished  No distress  HENT:   Head: Normocephalic and atraumatic  Right Ear: External ear normal    Left Ear: External ear normal    Nose: Nose normal    Tonsils swollen and red b/l without exudates  Airway patent  Pt able to handle secretions   Eyes: Conjunctivae are normal  Pupils are equal, round, and reactive to light  Neck: Normal range of motion  Neck supple  No tracheal deviation present  No thyromegaly present  Cardiovascular: Normal rate, regular rhythm, normal heart sounds and intact distal pulses  Pulmonary/Chest: Effort normal and breath sounds normal  No respiratory distress  She has no wheezes  She has no rales  Abdominal: Soft  Bowel sounds are normal  There is no tenderness  Musculoskeletal: She exhibits no edema or deformity  Lymphadenopathy:     She has no cervical adenopathy  Neurological: She is alert and oriented to person, place, and time  Skin: Skin is warm  No rash noted  She is not diaphoretic  Nursing note and vitals reviewed  Strep (+): Will start on amoxicillin  Educated pt on indications to RTED  Pt states she understands and agrees

## 2018-04-26 NOTE — PATIENT INSTRUCTIONS
Take antibiotics as prescribed  Follow up with family practice this week  Go to ER if new or worsening symptoms occur  Strep Throat   WHAT YOU NEED TO KNOW:   Strep throat is a throat infection caused by bacteria  It is easily spread from person to person  DISCHARGE INSTRUCTIONS:   Call 911 for any of the following:   · You have trouble breathing  Return to the emergency department if:   · You have new symptoms like a bad headache, stiff neck, chest pain, or vomiting  · You are drooling because you cannot swallow your spit  Contact your healthcare provider if:   · You have a fever  · You have a rash or ear pain  · You have green, yellow-brown, or bloody mucus when you cough or blow your nose  · You are unable to drink anything  · You have questions or concerns about your condition or care  Medicines:   · Antibiotics  help treat your strep throat  You should feel better within 2 to 3 days after you start antibiotics  · Take your medicine as directed  Contact your healthcare provider if you think your medicine is not helping or if you have side effects  Tell him or her if you are allergic to any medicine  Keep a list of the medicines, vitamins, and herbs you take  Include the amounts, and when and why you take them  Bring the list or the pill bottles to follow-up visits  Carry your medicine list with you in case of an emergency  Manage your symptoms:   · Use lozenges, ice, soft foods, or popsicles  to soothe your throat  · Drink juice, milk shakes, or soup  if your throat is too sore to eat solid food  Drinking liquids can also help prevent dehydration  · Gargle with salt water  Mix ¼ teaspoon salt in a glass of warm water and gargle  This may help reduce swelling in your throat  · Do not smoke  Nicotine and other chemicals in cigarettes and cigars can cause lung damage and make your symptoms worse   Ask your healthcare provider for information if you currently smoke and need help to quit  E-cigarettes or smokeless tobacco still contain nicotine  Talk to your healthcare provider before you use these products  Return to work or school  24 hours after you start antibiotic medicine  Prevent the spread of strep throat:   · Wash your hands often  Use soap and water  Wash your hands after you use the bathroom, change a child's diapers, or sneeze  Wash your hands before you prepare or eat food  · Do not share food or drinks  Replace your toothbrush after you have taken antibiotics for 24 hours  Follow up with your healthcare provider as directed:  Write down your questions so you remember to ask them during your visits  © 2017 [employer] Information is for End User's use only and may not be sold, redistributed or otherwise used for commercial purposes  All illustrations and images included in CareNotes® are the copyrighted property of [employer]  or Mike Fajardo  The above information is an  only  It is not intended as medical advice for individual conditions or treatments  Talk to your doctor, nurse or pharmacist before following any medical regimen to see if it is safe and effective for you

## 2018-09-26 ENCOUNTER — OFFICE VISIT (OUTPATIENT)
Dept: URGENT CARE | Facility: MEDICAL CENTER | Age: 23
End: 2018-09-26
Payer: COMMERCIAL

## 2018-09-26 VITALS
DIASTOLIC BLOOD PRESSURE: 62 MMHG | HEIGHT: 61 IN | BODY MASS INDEX: 23.98 KG/M2 | OXYGEN SATURATION: 98 % | HEART RATE: 74 BPM | WEIGHT: 127 LBS | SYSTOLIC BLOOD PRESSURE: 96 MMHG | RESPIRATION RATE: 18 BRPM | TEMPERATURE: 97.4 F

## 2018-09-26 DIAGNOSIS — K12.0 APHTHOUS ULCER: Primary | ICD-10-CM

## 2018-09-26 PROCEDURE — 99213 OFFICE O/P EST LOW 20 MIN: CPT | Performed by: PHYSICIAN ASSISTANT

## 2018-09-26 PROCEDURE — S9088 SERVICES PROVIDED IN URGENT: HCPCS | Performed by: PHYSICIAN ASSISTANT

## 2018-09-26 NOTE — PROGRESS NOTES
[facility]        NAME: Cherre Lesches is a 20 y o  female  : 1995    MRN: [id number]  DATE: 2018  TIME: 3:04 PM    Assessment and Plan   Aphthous ulcer [K12 0]  1  Aphthous ulcer  lidocaine viscous (XYLOCAINE) 2 % mucosal solution         Patient Instructions     Follow up with PCP in 3-5 days  Proceed to  ER if symptoms worsen  Chief Complaint     Chief Complaint   Patient presents with    Mouth Lesions     Sore spot noted 2 days ago to right-sided, posterior aspect of tongue         History of Present Illness       57-year-old female presents for a mouth ulcer  Patient states she has had the ulcer for the past 2 days and her symptoms are worsening  She is complaining of significant pain on the right side of her tongue as well as her jaw and ear  She is having difficulty swallowing  Denies any fever, chills, sweats  Review of Systems   Review of Systems   Constitutional: Negative  HENT: Positive for mouth sores  Negative for congestion, dental problem, ear discharge, ear pain, facial swelling, hearing loss, nosebleeds, postnasal drip, rhinorrhea, sinus pain, sinus pressure, sneezing, sore throat, tinnitus, trouble swallowing and voice change  Eyes: Negative  Respiratory: Negative  Gastrointestinal: Negative  Skin: Negative for rash  Neurological: Negative            Current Medications       Current Outpatient Prescriptions:     benzocaine-menthol-lanolin-aloe (DERMOPLAST) 20-0 5 % topical spray, Apply 1 application topically 4 (four) times a day as needed for irritation, Disp: , Rfl: 0    docusate sodium (COLACE) 100 mg capsule, Take 1 capsule by mouth 2 (two) times a day, Disp: 10 capsule, Rfl: 0    fluticasone (FLONASE) 50 mcg/act nasal spray, 1 spray into each nostril daily, Disp: 16 g, Rfl: 0    ibuprofen (MOTRIN) 600 mg tablet, Take 1 tablet by mouth every 6 (six) hours as needed for mild pain, Disp: 30 tablet, Rfl: 0    lidocaine viscous (XYLOCAINE) 2 % mucosal solution, Swish and spit 15 mL 4 (four) times a day as needed for mild pain, Disp: 100 mL, Rfl: 0    multivitamin (THERAGRAN) TABS, Take 1 tablet by mouth daily, Disp: , Rfl:     Prenatal Vit-DSS-Fe Fum-FA (PRENATAL 19) 29-1 MG TABS, Take by mouth, Disp: , Rfl:     SRONYX 0 1-20 MG-MCG per tablet, Take 1 tablet by mouth daily, Disp: , Rfl: 7    valACYclovir (VALTREX) 500 mg tablet, Take 500 mg by mouth 2 (two) times a day, Disp: , Rfl:     witch hazel-glycerin (TUCKS) topical pad, Apply 1 pad topically as needed for irritation or hemorrhoids, Disp: 40 each, Rfl: 0    Current Allergies     Allergies as of 09/26/2018 - Reviewed 09/26/2018   Allergen Reaction Noted    Other  10/23/2017            The following portions of the patient's history were reviewed and updated as appropriate: allergies, current medications, past family history, past medical history, past social history, past surgical history and problem list     Objective   BP 96/62   Pulse 74   Temp (!) 97 4 °F (36 3 °C)   Resp 18   Ht 5' 1" (1 549 m)   Wt 57 6 kg (127 lb)   SpO2 98%   BMI 24 00 kg/m²        Physical Exam     Physical Exam   Constitutional: Vital signs are normal  She appears well-developed and well-nourished  No distress  HENT:   Head: Normocephalic and atraumatic  Right Ear: Hearing, tympanic membrane, external ear and ear canal normal    Left Ear: Hearing, tympanic membrane, external ear and ear canal normal    Nose: Nose normal  No mucosal edema or rhinorrhea  Right sinus exhibits no maxillary sinus tenderness and no frontal sinus tenderness  Left sinus exhibits no maxillary sinus tenderness and no frontal sinus tenderness  Mouth/Throat: Uvula is midline, oropharynx is clear and moist and mucous membranes are normal  No oropharyngeal exudate, posterior oropharyngeal edema, posterior oropharyngeal erythema or tonsillar abscesses         Eyes: Conjunctivae and lids are normal  Cardiovascular: Normal rate, regular rhythm and normal heart sounds  No murmur heard  Pulmonary/Chest: Effort normal and breath sounds normal  No respiratory distress  She has no decreased breath sounds  She has no wheezes  She has no rhonchi  She has no rales  Lymphadenopathy:        Head (right side): No submandibular and no tonsillar adenopathy present  Head (left side): No submandibular and no tonsillar adenopathy present  Skin: No rash noted  Nursing note and vitals reviewed

## 2018-09-26 NOTE — PATIENT INSTRUCTIONS
Aphthous ulcer:  Lidocaine 4 times daily as needed  Ibuprofen as needed as well  If unable to eat or drink go to the ER

## 2019-01-02 ENCOUNTER — APPOINTMENT (OUTPATIENT)
Dept: LAB | Facility: HOSPITAL | Age: 24
End: 2019-01-02
Attending: OBSTETRICS & GYNECOLOGY
Payer: COMMERCIAL

## 2019-01-02 ENCOUNTER — TRANSCRIBE ORDERS (OUTPATIENT)
Dept: ADMINISTRATIVE | Facility: HOSPITAL | Age: 24
End: 2019-01-02

## 2019-01-02 DIAGNOSIS — Z32.01 ENCOUNTER FOR PREGNANCY TEST, RESULT POSITIVE: Primary | ICD-10-CM

## 2019-01-02 DIAGNOSIS — Z32.01 ENCOUNTER FOR PREGNANCY TEST, RESULT POSITIVE: ICD-10-CM

## 2019-01-02 LAB — B-HCG SERPL-ACNC: ABNORMAL MIU/ML

## 2019-01-02 PROCEDURE — 84702 CHORIONIC GONADOTROPIN TEST: CPT

## 2019-01-02 PROCEDURE — 36415 COLL VENOUS BLD VENIPUNCTURE: CPT

## 2019-02-14 ENCOUNTER — LAB REQUISITION (OUTPATIENT)
Dept: LAB | Facility: HOSPITAL | Age: 24
End: 2019-02-14
Payer: COMMERCIAL

## 2019-02-14 DIAGNOSIS — Z34.93 ENCOUNTER FOR SUPERVISION OF NORMAL PREGNANCY IN THIRD TRIMESTER: ICD-10-CM

## 2019-02-14 LAB — EXTERNAL CHLAMYDIA SCREEN: NEGATIVE

## 2019-02-14 PROCEDURE — 87591 N.GONORRHOEAE DNA AMP PROB: CPT | Performed by: OBSTETRICS & GYNECOLOGY

## 2019-02-14 PROCEDURE — 87491 CHLMYD TRACH DNA AMP PROBE: CPT | Performed by: OBSTETRICS & GYNECOLOGY

## 2019-02-15 LAB
C TRACH DNA SPEC QL NAA+PROBE: NEGATIVE
N GONORRHOEA DNA SPEC QL NAA+PROBE: NEGATIVE

## 2019-03-06 ENCOUNTER — OFFICE VISIT (OUTPATIENT)
Dept: FAMILY MEDICINE CLINIC | Facility: CLINIC | Age: 24
End: 2019-03-06
Payer: COMMERCIAL

## 2019-03-06 VITALS
DIASTOLIC BLOOD PRESSURE: 70 MMHG | RESPIRATION RATE: 16 BRPM | HEIGHT: 61 IN | BODY MASS INDEX: 24.66 KG/M2 | TEMPERATURE: 98.3 F | HEART RATE: 100 BPM | SYSTOLIC BLOOD PRESSURE: 110 MMHG | WEIGHT: 130.6 LBS

## 2019-03-06 DIAGNOSIS — R05.9 COUGH: ICD-10-CM

## 2019-03-06 DIAGNOSIS — J06.9 UPPER RESPIRATORY TRACT INFECTION, UNSPECIFIED TYPE: Primary | ICD-10-CM

## 2019-03-06 DIAGNOSIS — D17.1 LIPOMA OF CHEST WALL: ICD-10-CM

## 2019-03-06 DIAGNOSIS — J02.9 SORE THROAT: ICD-10-CM

## 2019-03-06 LAB — S PYO AG THROAT QL: NEGATIVE

## 2019-03-06 PROCEDURE — 3008F BODY MASS INDEX DOCD: CPT | Performed by: FAMILY MEDICINE

## 2019-03-06 PROCEDURE — 1036F TOBACCO NON-USER: CPT | Performed by: FAMILY MEDICINE

## 2019-03-06 PROCEDURE — 99213 OFFICE O/P EST LOW 20 MIN: CPT | Performed by: FAMILY MEDICINE

## 2019-03-06 PROCEDURE — 87880 STREP A ASSAY W/OPTIC: CPT | Performed by: FAMILY MEDICINE

## 2019-03-06 PROCEDURE — 87070 CULTURE OTHR SPECIMN AEROBIC: CPT | Performed by: FAMILY MEDICINE

## 2019-03-07 NOTE — PROGRESS NOTES
Assessment/Plan:  1  Upper respiratory tract infection  - Peak flow  - POCT rapid strep A negative  - Throat culture obtained and we will call with results  - advised rest, plenty of fluids, gargle with salt water, take Tylenol as needed, Robitussin as needed for cough/ congestion and use saline nasal spray  - follow up in the office if symptoms persist or worsen    2  Lipoma of chest wall  - Ambulatory referral to General Surgery; Future    3  Pregnancy  - follow up with Ob/Gyn    The treatment plan was reviewed with the patient  The patient understands and agrees with the treatment plan      Subjective:   Chief Complaint   Patient presents with    Cold Like Symptoms    Cough      Patient ID: Chantelle Medellin is a 20 y o  female who presents today with c/o nasal congestion, runny nose with clear rhinorrhea, cough, sore throat and clogged left ear onset 3-4 days ago, she has been feeling achy and tired, no fever, no night sweats, no purulent mucus production, no chest pain, no wheezing or SOB  She is 15 weeks pregnant and has been taking Tylenol  Patient is also c/o asymptomatic mass over her right lower lateral chest for several years which does not seem to be getting larger size  The following portions of the patient's history were reviewed and updated as appropriate: allergies, current medications, past family history, past medical history, past social history, past surgical history and problem list     No past medical history on file  History reviewed  No pertinent surgical history    Family History   Problem Relation Age of Onset    Aneurysm Father         cardiac    Heart disease Father         problem    Leukemia Brother         chronic lymphoid    Aneurysm Paternal Grandfather         cardiac     Throat cancer Family     Alcohol abuse Neg Hx     Substance Abuse Neg Hx     Mental illness Neg Hx      Social History     Socioeconomic History    Marital status: /Civil Union     Spouse name: Not on file    Number of children: Not on file    Years of education: Not on file    Highest education level: Not on file   Occupational History    Not on file   Social Needs    Financial resource strain: Not on file    Food insecurity:     Worry: Not on file     Inability: Not on file    Transportation needs:     Medical: Not on file     Non-medical: Not on file   Tobacco Use    Smoking status: Never Smoker    Smokeless tobacco: Never Used   Substance and Sexual Activity    Alcohol use: No    Drug use: No    Sexual activity: Yes     Partners: Male   Lifestyle    Physical activity:     Days per week: Not on file     Minutes per session: Not on file    Stress: Not on file   Relationships    Social connections:     Talks on phone: Not on file     Gets together: Not on file     Attends Mosque service: Not on file     Active member of club or organization: Not on file     Attends meetings of clubs or organizations: Not on file     Relationship status: Not on file    Intimate partner violence:     Fear of current or ex partner: Not on file     Emotionally abused: Not on file     Physically abused: Not on file     Forced sexual activity: Not on file   Other Topics Concern    Not on file   Social History Narrative    Occasional caffeine consumption       Current Outpatient Medications:     fluticasone (FLONASE) 50 mcg/act nasal spray, 1 spray into each nostril daily, Disp: 16 g, Rfl: 0    Prenatal Vit-DSS-Fe Fum-FA (PRENATAL 19) 29-1 MG TABS, Take by mouth, Disp: , Rfl:     valACYclovir (VALTREX) 500 mg tablet, Take 500 mg by mouth 2 (two) times a day, Disp: , Rfl:     Review of Systems   Constitutional: Positive for fatigue  Negative for chills and fever  HENT: Positive for congestion, rhinorrhea and sore throat  Negative for ear discharge, ear pain, sinus pressure, sinus pain, trouble swallowing and voice change  Respiratory: Positive for cough   Negative for shortness of breath and wheezing  Cardiovascular: Negative for chest pain, palpitations and leg swelling  Gastrointestinal: Negative for abdominal pain, diarrhea, nausea and vomiting  Skin: Negative for rash  Neurological: Negative for dizziness and headaches  Hematological: Negative for adenopathy  Objective:    Vitals:    03/06/19 1432   BP: 110/70   BP Location: Left arm   Patient Position: Sitting   Cuff Size: Standard   Pulse: 100   Resp: 16   Temp: 98 3 °F (36 8 °C)   TempSrc: Oral   Weight: 59 2 kg (130 lb 9 6 oz)   Height: 5' 0 5" (1 537 m)        Physical Exam   Constitutional: She is oriented to person, place, and time  She appears well-developed and well-nourished  No distress  HENT:   Head: Normocephalic and atraumatic  Right Ear: Tympanic membrane and ear canal normal    Left Ear: Tympanic membrane and ear canal normal    Nose: Mucosal edema present  Right sinus exhibits no maxillary sinus tenderness and no frontal sinus tenderness  Left sinus exhibits no maxillary sinus tenderness and no frontal sinus tenderness  Mouth/Throat: Posterior oropharyngeal erythema present  No oropharyngeal exudate  Neck: Neck supple  Cardiovascular: Normal rate, regular rhythm and normal heart sounds  No murmur heard  Pulmonary/Chest: Effort normal and breath sounds normal  No respiratory distress  She has no wheezes  She has no rhonchi  She has no rales  Lymphadenopathy:     She has no cervical adenopathy  Neurological: She is alert and oriented to person, place, and time     Skin:   Right lower lateral chest with about 8 cm non tender mobile subcutaneous mass         Office Visit on 03/06/2019   Component Date Value Ref Range Status    OTHER 03/06/2019    Final    350, 350, 350     RAPID STREP A 03/06/2019 Negative  Negative Final    03/06/2019

## 2019-03-08 ENCOUNTER — TELEPHONE (OUTPATIENT)
Dept: FAMILY MEDICINE CLINIC | Facility: CLINIC | Age: 24
End: 2019-03-08

## 2019-03-08 LAB — BACTERIA THROAT CULT: NORMAL

## 2019-03-08 NOTE — TELEPHONE ENCOUNTER
----- Message from Ronak Velasco MD sent at 3/8/2019 10:20 AM EST -----  Please inform patient of negative results

## 2019-03-11 ENCOUNTER — TELEPHONE (OUTPATIENT)
Dept: FAMILY MEDICINE CLINIC | Facility: CLINIC | Age: 24
End: 2019-03-11

## 2019-03-11 DIAGNOSIS — R09.81 SINUS CONGESTION: Primary | ICD-10-CM

## 2019-03-11 RX ORDER — AMOXICILLIN 500 MG/1
500 TABLET, FILM COATED ORAL 3 TIMES DAILY
Qty: 30 TABLET | Refills: 0 | Status: SHIPPED | OUTPATIENT
Start: 2019-03-11 | End: 2019-03-21

## 2019-03-11 NOTE — TELEPHONE ENCOUNTER
Patient is calling that she has gotten worse since her appointment  She now has pain in both of her ears and both clogged  She has sinus pain and her face hurts  No fever but green discharge  Patient also has cough  She is pregnant    Uses [employer]

## 2019-03-11 NOTE — TELEPHONE ENCOUNTER
I am sending a script for Amoxicillin to her [facility] pharmacy  If her symptoms are not better in next few days, she needs to be seen

## 2019-03-21 ENCOUNTER — APPOINTMENT (OUTPATIENT)
Dept: LAB | Facility: HOSPITAL | Age: 24
End: 2019-03-21
Attending: OBSTETRICS & GYNECOLOGY
Payer: COMMERCIAL

## 2019-03-21 ENCOUNTER — CONSULT (OUTPATIENT)
Dept: SURGERY | Facility: CLINIC | Age: 24
End: 2019-03-21
Payer: COMMERCIAL

## 2019-03-21 ENCOUNTER — TRANSCRIBE ORDERS (OUTPATIENT)
Dept: ADMINISTRATIVE | Facility: HOSPITAL | Age: 24
End: 2019-03-21

## 2019-03-21 VITALS
BODY MASS INDEX: 25.11 KG/M2 | SYSTOLIC BLOOD PRESSURE: 108 MMHG | WEIGHT: 133 LBS | TEMPERATURE: 97.8 F | HEART RATE: 89 BPM | RESPIRATION RATE: 18 BRPM | DIASTOLIC BLOOD PRESSURE: 68 MMHG | HEIGHT: 61 IN

## 2019-03-21 DIAGNOSIS — Z36.0 ENCOUNTER FOR ANTENATAL SCREENING FOR CHROMOSOMAL ANOMALIES: Primary | ICD-10-CM

## 2019-03-21 DIAGNOSIS — Z34.81 PRENATAL CARE, SUBSEQUENT PREGNANCY, FIRST TRIMESTER: Primary | ICD-10-CM

## 2019-03-21 DIAGNOSIS — Z36.0 ENCOUNTER FOR ANTENATAL SCREENING FOR CHROMOSOMAL ANOMALIES: ICD-10-CM

## 2019-03-21 DIAGNOSIS — D17.1 LIPOMA OF CHEST WALL: ICD-10-CM

## 2019-03-21 DIAGNOSIS — Z34.81 ENCOUNTER FOR SUPERVISION OF OTHER NORMAL PREGNANCY IN FIRST TRIMESTER: ICD-10-CM

## 2019-03-21 DIAGNOSIS — Z34.81 PRENATAL CARE, SUBSEQUENT PREGNANCY, FIRST TRIMESTER: ICD-10-CM

## 2019-03-21 LAB
ABO GROUP BLD: NORMAL
BASOPHILS # BLD AUTO: 0.03 THOUSANDS/ΜL (ref 0–0.1)
BASOPHILS NFR BLD AUTO: 0 % (ref 0–1)
BILIRUB UR QL STRIP: NEGATIVE
BLD GP AB SCN SERPL QL: NEGATIVE
CLARITY UR: CLEAR
COLOR UR: YELLOW
EOSINOPHIL # BLD AUTO: 0.14 THOUSAND/ΜL (ref 0–0.61)
EOSINOPHIL NFR BLD AUTO: 1 % (ref 0–6)
ERYTHROCYTE [DISTWIDTH] IN BLOOD BY AUTOMATED COUNT: 13.4 % (ref 11.6–15.1)
GLUCOSE SERPL-MCNC: 71 MG/DL (ref 65–140)
GLUCOSE UR STRIP-MCNC: NEGATIVE MG/DL
HCT VFR BLD AUTO: 31.4 % (ref 34.8–46.1)
HGB BLD-MCNC: 10.7 G/DL (ref 11.5–15.4)
HGB UR QL STRIP.AUTO: NEGATIVE
IMM GRANULOCYTES # BLD AUTO: 0.18 THOUSAND/UL (ref 0–0.2)
IMM GRANULOCYTES NFR BLD AUTO: 1 % (ref 0–2)
KETONES UR STRIP-MCNC: NEGATIVE MG/DL
LEUKOCYTE ESTERASE UR QL STRIP: NEGATIVE
LYMPHOCYTES # BLD AUTO: 1.72 THOUSANDS/ΜL (ref 0.6–4.47)
LYMPHOCYTES NFR BLD AUTO: 13 % (ref 14–44)
MCH RBC QN AUTO: 31.6 PG (ref 26.8–34.3)
MCHC RBC AUTO-ENTMCNC: 34.1 G/DL (ref 31.4–37.4)
MCV RBC AUTO: 93 FL (ref 82–98)
MONOCYTES # BLD AUTO: 0.62 THOUSAND/ΜL (ref 0.17–1.22)
MONOCYTES NFR BLD AUTO: 5 % (ref 4–12)
NEUTROPHILS # BLD AUTO: 10.19 THOUSANDS/ΜL (ref 1.85–7.62)
NEUTS SEG NFR BLD AUTO: 80 % (ref 43–75)
NITRITE UR QL STRIP: NEGATIVE
NRBC BLD AUTO-RTO: 0 /100 WBCS
PH UR STRIP.AUTO: 6 [PH]
PLATELET # BLD AUTO: 283 THOUSANDS/UL (ref 149–390)
PMV BLD AUTO: 9.6 FL (ref 8.9–12.7)
PROT UR STRIP-MCNC: NEGATIVE MG/DL
RBC # BLD AUTO: 3.39 MILLION/UL (ref 3.81–5.12)
RH BLD: POSITIVE
SP GR UR STRIP.AUTO: 1.02 (ref 1–1.03)
SPECIMEN EXPIRATION DATE: NORMAL
UROBILINOGEN UR QL STRIP.AUTO: 0.2 E.U./DL
WBC # BLD AUTO: 12.88 THOUSAND/UL (ref 4.31–10.16)

## 2019-03-21 PROCEDURE — 99243 OFF/OP CNSLTJ NEW/EST LOW 30: CPT | Performed by: PHYSICIAN ASSISTANT

## 2019-03-21 PROCEDURE — 86787 VARICELLA-ZOSTER ANTIBODY: CPT

## 2019-03-21 PROCEDURE — 80081 OBSTETRIC PANEL INC HIV TSTG: CPT

## 2019-03-21 PROCEDURE — 36415 COLL VENOUS BLD VENIPUNCTURE: CPT

## 2019-03-21 PROCEDURE — 81003 URINALYSIS AUTO W/O SCOPE: CPT

## 2019-03-21 PROCEDURE — 82947 ASSAY GLUCOSE BLOOD QUANT: CPT

## 2019-03-21 PROCEDURE — 82105 ALPHA-FETOPROTEIN SERUM: CPT

## 2019-03-21 NOTE — PROGRESS NOTES
Assessment/Plan:   Mamta Schofield is a 20 y  o female who is here for The encounter diagnosis was Lipoma of chest wall  On exam found to have Lipoma of the : right chest wall  Plan: Excise lesion(s) under anesthesia in the operating room      Positioning: lateral, right side up    Post Op Pain Management:   Mitra Leslie    - Patient has been instructed to avoid aspirin containing medications or non-steroidal anti-inflammatory drugs for SEVEN days preceding surgery  Preoperative Clearance: None          _______________________________________________________  CC: Mass (Right side, about 8 years   )    HPI:  Mamta Schofield is a 20 y  o female who was referred for evaluation of Mass (Right side, about 8 years   )    Currently patient reports mass of right chest wall that has gotten bigger over the years  Reports: growing    Location: right chest mid axillary line at base of ribs      ROS:  General ROS: negative  negative for - chills, fatigue, fever or night sweats, weight loss  Respiratory ROS: no cough, shortness of breath, or wheezing  Cardiovascular ROS: no chest pain or dyspnea on exertion  Genito-Urinary ROS: no dysuria, trouble voiding, or hematuria  Musculoskeletal ROS: negative for - gait disturbance, joint pain or muscle pain  Neurological ROS: no TIA or stroke symptoms  Skin ROS: See HPI  GI ROS: see HPI  Skin ROS: no new rashes or lesions    Lymphatic ROS: no new adenopathy noted by pt  GYN ROS: see HPI, no new GYN history or bleeding noted  Psy ROS: no new mental or behavioral disturbances       Patient Active Problem List   Diagnosis    39 weeks gestation of pregnancy    PROM (premature rupture of membranes)    Spontaneous vaginal delivery         Allergies:   Other      Current Outpatient Medications:     amoxicillin (AMOXIL) 500 MG tablet, Take 1 tablet (500 mg total) by mouth 3 (three) times a day for 10 days, Disp: 30 tablet, Rfl: 0    Prenatal Vit-DSS-Fe Fum-FA (PRENATAL 19) 29-1 MG TABS, Take by mouth, Disp: , Rfl:     Probiotic Product (PROBIOTIC-10 PO), Probiotic, Disp: , Rfl:     valACYclovir (VALTREX) 500 mg tablet, Take 500 mg by mouth 2 (two) times a day, Disp: , Rfl:     fluticasone (FLONASE) 50 mcg/act nasal spray, 1 spray into each nostril daily (Patient not taking: Reported on 3/21/2019), Disp: 16 g, Rfl: 0    History reviewed  No pertinent past medical history  Past Surgical History:   Procedure Laterality Date    NO PAST SURGERIES         Family History   Problem Relation Age of Onset    Aneurysm Father         cardiac    Heart disease Father         problem    Leukemia Brother         chronic lymphoid    Aneurysm Paternal Grandfather         cardiac     Throat cancer Family     Alcohol abuse Neg Hx     Substance Abuse Neg Hx     Mental illness Neg Hx         reports that she has never smoked  She has never used smokeless tobacco  She reports that she does not drink alcohol or use drugs  PHYSICAL EXAM  General Appearance:    Alert, cooperative, no distress   Head:    Normocephalic without obvious abnormality   Eyes:    PERRL, conjunctiva/corneas clear, EOM's intact        Neck:   Supple, no adenopathy, no JVD   Back:     Symmetric, no spinal or CVA tenderness   Lungs:     Clear to auscultation bilaterally, no wheezing or rhonchi   Heart:    Regular rate and rhythm, S1 and S2 normal, no murmur   Abdomen:     Benign, no rebound or guarding  Extremities:   Extremities normal  No clubbing, cyanosis or edema   Psych:   Normal Affect, AOx3  Neurologic:  Skin:   CNII-XII intact  Strength symmetric, speech intact    Warm, dry, intact, no visible rashes or lesions except as follows: soft tissue mass measuring approximately 7 cm right chest mid axillary line base of ribs               Some portions of this record may have been generated with voice recognition software  There may be translation, syntax,  or grammatical errors   Occasional wrong word or "sound-a-like" substitutions may have occurred due to the inherent limitations of the voice recognition software  Read the chart carefully and recognize, using context, where substitutions may have occurred  If you have any questions, please contact the dictating provider for clarification or correction, as needed  This encounter has been coded by a non-certified coder         Duncan Gutierrez MD    Date: 3/21/2019 Time: 3:14 PM

## 2019-03-22 LAB
HBV SURFACE AG SER QL: NORMAL
HIV 1+2 AB+HIV1 P24 AG SERPL QL IA: NORMAL
RPR SER QL: NORMAL
RUBV IGG SERPL IA-ACNC: 24.3 IU/ML

## 2019-03-26 LAB
2ND TRIMESTER 4 SCREEN SERPL-IMP: NORMAL
AFP ADJ MOM SERPL: 0.71
AFP INTERP AMN-IMP: NORMAL
AFP INTERP SERPL-IMP: NORMAL
AFP INTERP SERPL-IMP: NORMAL
AFP SERPL-MCNC: 28.7 NG/ML
AGE AT DELIVERY: 26.3 YR
GA METHOD: NORMAL
GA: 17.1 WEEKS
IDDM PATIENT QL: NO
MULTIPLE PREGNANCY: NO
NEURAL TUBE DEFECT RISK FETUS: NORMAL %
VZV IGG SER IA-ACNC: NORMAL

## 2019-06-05 ENCOUNTER — LAB REQUISITION (OUTPATIENT)
Dept: LAB | Facility: HOSPITAL | Age: 24
End: 2019-06-05
Payer: COMMERCIAL

## 2019-06-05 DIAGNOSIS — Z34.83 ENCOUNTER FOR SUPERVISION OF OTHER NORMAL PREGNANCY, THIRD TRIMESTER: ICD-10-CM

## 2019-06-05 LAB
BASOPHILS # BLD AUTO: 0.06 THOUSANDS/ΜL (ref 0–0.1)
BASOPHILS NFR BLD AUTO: 0 % (ref 0–1)
EOSINOPHIL # BLD AUTO: 0.13 THOUSAND/ΜL (ref 0–0.61)
EOSINOPHIL NFR BLD AUTO: 1 % (ref 0–6)
ERYTHROCYTE [DISTWIDTH] IN BLOOD BY AUTOMATED COUNT: 13.5 % (ref 11.6–15.1)
GLUCOSE 1H P 50 G GLC PO SERPL-MCNC: 91 MG/DL
HCT VFR BLD AUTO: 32.4 % (ref 34.8–46.1)
HGB BLD-MCNC: 10.7 G/DL (ref 11.5–15.4)
IMM GRANULOCYTES # BLD AUTO: 0.38 THOUSAND/UL (ref 0–0.2)
IMM GRANULOCYTES NFR BLD AUTO: 2 % (ref 0–2)
LYMPHOCYTES # BLD AUTO: 1.97 THOUSANDS/ΜL (ref 0.6–4.47)
LYMPHOCYTES NFR BLD AUTO: 12 % (ref 14–44)
MCH RBC QN AUTO: 30.7 PG (ref 26.8–34.3)
MCHC RBC AUTO-ENTMCNC: 33 G/DL (ref 31.4–37.4)
MCV RBC AUTO: 93 FL (ref 82–98)
MONOCYTES # BLD AUTO: 1.19 THOUSAND/ΜL (ref 0.17–1.22)
MONOCYTES NFR BLD AUTO: 7 % (ref 4–12)
NEUTROPHILS # BLD AUTO: 13.16 THOUSANDS/ΜL (ref 1.85–7.62)
NEUTS SEG NFR BLD AUTO: 78 % (ref 43–75)
NRBC BLD AUTO-RTO: 0 /100 WBCS
PLATELET # BLD AUTO: 200 THOUSANDS/UL (ref 149–390)
PMV BLD AUTO: 10.5 FL (ref 8.9–12.7)
RBC # BLD AUTO: 3.49 MILLION/UL (ref 3.81–5.12)
WBC # BLD AUTO: 16.89 THOUSAND/UL (ref 4.31–10.16)

## 2019-06-05 PROCEDURE — 82950 GLUCOSE TEST: CPT | Performed by: OBSTETRICS & GYNECOLOGY

## 2019-06-05 PROCEDURE — 86592 SYPHILIS TEST NON-TREP QUAL: CPT | Performed by: OBSTETRICS & GYNECOLOGY

## 2019-06-05 PROCEDURE — 85025 COMPLETE CBC W/AUTO DIFF WBC: CPT | Performed by: OBSTETRICS & GYNECOLOGY

## 2019-06-06 LAB — RPR SER QL: NORMAL

## 2019-08-06 ENCOUNTER — LAB REQUISITION (OUTPATIENT)
Dept: LAB | Facility: HOSPITAL | Age: 24
End: 2019-08-06
Payer: COMMERCIAL

## 2019-08-06 DIAGNOSIS — Z34.93 ENCOUNTER FOR SUPERVISION OF NORMAL PREGNANCY IN THIRD TRIMESTER: ICD-10-CM

## 2019-08-06 PROCEDURE — 87653 STREP B DNA AMP PROBE: CPT | Performed by: OBSTETRICS & GYNECOLOGY

## 2019-08-07 LAB — GP B STREP DNA SPEC QL NAA+PROBE: NORMAL

## 2019-08-17 ENCOUNTER — HOSPITAL ENCOUNTER (OUTPATIENT)
Facility: HOSPITAL | Age: 24
Discharge: HOME | End: 2019-08-17
Attending: OBSTETRICS & GYNECOLOGY | Admitting: OBSTETRICS & GYNECOLOGY
Payer: COMMERCIAL

## 2019-08-17 PROBLEM — O47.9 UTERINE CONTRACTIONS DURING PREGNANCY: Status: ACTIVE | Noted: 2019-08-17

## 2019-08-17 PROCEDURE — 99202 OFFICE O/P NEW SF 15 MIN: CPT

## 2019-08-17 NOTE — DISCHARGE INSTRUCTIONS
Pregnancy at [address] to [address]:   What changes are happening in my body? You are considered full term at the beginning of 37 weeks  Your breathing may be easier if your baby has moved down into a head-down position  You may need to urinate more often because the baby may be pressing on your bladder  You may also feel more discomfort and get tired easily  How do I care for myself at this stage of my pregnancy? · Eat a variety of healthy foods  Healthy foods include fruits, vegetables, whole-grain breads, low-fat dairy foods, beans, lean meats, and fish  Drink liquids as directed  Ask how much liquid to drink each day and which liquids are best for you  Limit caffeine to less than 200 milligrams each day  Limit your intake of fish to 2 servings each week  Choose fish low in mercury such as canned light tuna, shrimp, salmon, cod, or tilapia  Do not  eat fish high in mercury such as swordfish, tilefish, gisele mackerel, and shark  · Take prenatal vitamins as directed  Your need for certain vitamins and minerals, such as folic acid, increases during pregnancy  Prenatal vitamins provide some of the extra vitamins and minerals you need  Prenatal vitamins may also help to decrease the risk of certain birth defects  · Rest as needed  Put your feet up if you have swelling in your ankles and feet  · Do not smoke  If you smoke, it is never too late to quit  Smoking increases your risk of a miscarriage and other health problems during your pregnancy  Smoking can cause your baby to be born too early or weigh less at birth  Ask your healthcare provider for information if you need help quitting  · Do not drink alcohol  Alcohol passes from your body to your baby through the placenta  It can affect your baby's brain development and cause fetal alcohol syndrome (FAS)  FAS is a group of conditions that causes mental, behavior, and growth problems       · Talk to your healthcare provider before you take any medicines  Many medicines may harm your baby if you take them when you are pregnant  Do not take any medicines, vitamins, herbs, or supplements without first talking to your healthcare provider  Never use illegal or street drugs (such as marijuana or cocaine) while you are pregnant  · Talk to your healthcare provider before you travel  You may not be able to travel in an airplane after 36 weeks  He may also recommend that you avoid long road trips  What are some safety tips during pregnancy? · Avoid hot tubs and saunas  Do not use a hot tub or sauna while you are pregnant, especially during your first trimester  Hot tubs and saunas may raise your baby's temperature and increase the risk of birth defects  · Avoid toxoplasmosis  This is an infection caused by eating raw meat or being around infected cat feces  It can cause birth defects, miscarriages, and other problems  Wash your hands after you touch raw meat  Make sure any meat is well-cooked before you eat it  Avoid raw eggs and unpasteurized milk  Use gloves or ask someone else to clean your cat's litter box while you are pregnant  · Ask your healthcare provider about travel  The most comfortable time to travel is during the second trimester  Ask your healthcare provider if you can travel after 36 weeks  You may not be able to travel in an airplane after 36 weeks  He may also recommend that you avoid long road trips  What changes are happening with my baby? By 38 weeks, your baby may weigh between 6 and 9 pounds  Your baby may be about 14 inches long from the top of the head to the rump (baby's bottom)  Your baby hears well enough to know your voice  As your baby gets larger, you may feel fewer kicks and more stretching and rolling  Your baby may move into a head-down position  Your baby will also rest lower in your abdomen  What do I need to know about prenatal care?   Your healthcare provider will check your blood pressure and weight  You may also need the following:  · A urine test  may also be done to check for sugar and protein  These can be signs of gestational diabetes or infection  Protein in your urine may also be a sign of preeclampsia  Preeclampsia is a condition that can develop during week 20 or later of your pregnancy  It causes high blood pressure, and it can cause problems with your kidneys and other organs  · A blood test  may be done to check for anemia (low iron level)  · A Tdap vaccine  may be recommended by your healthcare provider  · A group B strep test  is a test that is done to check for group B strep infection  Group B strep is a type of bacteria that may be found in the vagina or rectum  It can be passed to your baby during delivery if you have it  Your healthcare provider will take swab your vagina or rectum and send the sample to the lab for tests  · Fundal height  is a measurement of your uterus to check your baby's growth  This number is usually the same as the number of weeks that you have been pregnant  Your healthcare provider may also check your baby's position  · Your baby's heart rate  will be checked  When should I seek immediate care? · You develop a severe headache that does not go away  · You have new or increased vision changes, such as blurred or spotted vision  · You have new or increased swelling in your face or hands  · You have vaginal spotting or bleeding  · Your water broke or you feel warm water gushing or trickling from your vagina  When should I contact my healthcare provider? · You have more than 5 contractions in 1 hour  · You notice any changes in your baby's movements  · You have abdominal cramps, pressure, or tightening  · You have a change in vaginal discharge  · You have chills or a fever  · You have vaginal itching, burning, or pain  · You have yellow, green, white, or foul-smelling vaginal discharge      · You have pain or burning when you urinate, less urine than usual, or pink or bloody urine  · You have questions or concerns about your condition or care  CARE AGREEMENT:   You have the right to help plan your care  Learn about your health condition and how it may be treated  Discuss treatment options with your caregivers to decide what care you want to receive  You always have the right to refuse treatment  The above information is an  only  It is not intended as medical advice for individual conditions or treatments  Talk to your doctor, nurse or pharmacist before following any medical regimen to see if it is safe and effective for you  © 2017 [employer]  Information is for End User's use only and may not be sold, redistributed or otherwise used for commercial purposes  All illustrations and images included in CareNotes® are the copyrighted property of A [employer] M [employer]  or Mike Fajardo

## 2019-08-17 NOTE — PROGRESS NOTES
Triage Note - OB  Raymundo Licona 30 y o  female MRN: [id number]  Unit/Bed#: L&D  Encounter: [id number]    L&D Triage Note - OB/GYN  Raymundo Licona 30 y o  female MRN: [id number]  Unit/Bed#: L&D  Encounter: [id number]      Assessment:  30 y o   at 45 3/7 wks, with uterine contractions, not in labor    Plan:  1  Discharge to home  2  Rest, increase hydration  3   S&S labor reviewed  4  Follow up in office this week for routine PN visit (scheduled for 19)  ______________________________________________________________________      Chief Compliant: Contractions/cramping    TIME: 1342  Subjective:  30 y o   at Unknown 38 3/7 wks presents complaining of contractions and cramping since this morning  Reports good fetal movement  Denies leaking fluid or bleeding  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

## 2019-08-22 ENCOUNTER — HOSPITAL ENCOUNTER (OUTPATIENT)
Facility: HOSPITAL | Age: 24
Discharge: HOME | End: 2019-08-22
Attending: OBSTETRICS & GYNECOLOGY | Admitting: OBSTETRICS & GYNECOLOGY
Payer: COMMERCIAL

## 2019-08-22 VITALS
BODY MASS INDEX: 27.29 KG/M2 | DIASTOLIC BLOOD PRESSURE: 72 MMHG | SYSTOLIC BLOOD PRESSURE: 117 MMHG | OXYGEN SATURATION: 98 % | HEART RATE: 114 BPM | WEIGHT: 154 LBS | TEMPERATURE: 98.7 F | RESPIRATION RATE: 18 BRPM | HEIGHT: 63 IN

## 2019-08-22 LAB
ABO GROUP BLD: NORMAL
BLD GP AB SCN SERPL QL: NEGATIVE
ERYTHROCYTE [DISTWIDTH] IN BLOOD BY AUTOMATED COUNT: 14.4 % (ref 11.6–15.1)
HCT VFR BLD AUTO: 33.9 % (ref 34.8–46.1)
HGB BLD-MCNC: 11.2 G/DL (ref 11.5–15.4)
MCH RBC QN AUTO: 30.2 PG (ref 26.8–34.3)
MCHC RBC AUTO-ENTMCNC: 33 G/DL (ref 31.4–37.4)
MCV RBC AUTO: 91 FL (ref 82–98)
PLATELET # BLD AUTO: 206 THOUSANDS/UL (ref 149–390)
PMV BLD AUTO: 10.4 FL (ref 8.9–12.7)
RBC # BLD AUTO: 3.71 MILLION/UL (ref 3.81–5.12)
RH BLD: POSITIVE
RPR SER QL: NORMAL
SPECIMEN EXPIRATION DATE: NORMAL
WBC # BLD AUTO: 14.74 THOUSAND/UL (ref 4.31–10.16)

## 2019-08-22 PROCEDURE — 86592 SYPHILIS TEST NON-TREP QUAL: CPT | Performed by: STUDENT IN AN ORGANIZED HEALTH CARE EDUCATION/TRAINING PROGRAM

## 2019-08-22 PROCEDURE — 85027 COMPLETE CBC AUTOMATED: CPT | Performed by: STUDENT IN AN ORGANIZED HEALTH CARE EDUCATION/TRAINING PROGRAM

## 2019-08-22 PROCEDURE — 86900 BLOOD TYPING SEROLOGIC ABO: CPT | Performed by: STUDENT IN AN ORGANIZED HEALTH CARE EDUCATION/TRAINING PROGRAM

## 2019-08-22 PROCEDURE — 86901 BLOOD TYPING SEROLOGIC RH(D): CPT | Performed by: STUDENT IN AN ORGANIZED HEALTH CARE EDUCATION/TRAINING PROGRAM

## 2019-08-22 PROCEDURE — 86850 RBC ANTIBODY SCREEN: CPT | Performed by: STUDENT IN AN ORGANIZED HEALTH CARE EDUCATION/TRAINING PROGRAM

## 2019-08-22 PROCEDURE — 99202 OFFICE O/P NEW SF 15 MIN: CPT

## 2019-08-22 RX ORDER — TRISODIUM CITRATE DIHYDRATE AND CITRIC ACID MONOHYDRATE 500; 334 MG/5ML; MG/5ML
30 SOLUTION ORAL ONCE
Status: DISCONTINUED | OUTPATIENT
Start: 2019-08-22 | End: 2019-08-22 | Stop reason: HOSPADM

## 2019-08-22 NOTE — PROGRESS NOTES
Update:  Patient fetal heart tracing has resumed normal appearance with baseline of 120, moderate variability, 15 x 15 accelerations, they are no longer prolonged accelerations, no decelerations  She is currently lisa every 3 minutes without interval cervical change  She remains 2, 80, -2  Patient discussed with Dr Ladonna ordaz and may that she is safe to discharge home  She has scheduled induction for Friday August 23rd      Due to safety of the floor with nursing to patient ratios and availability of labor rooms, decision made not to keep her for induction today without imminent medical cause as fetal heart tracing has been persistently category 1 for over an hour    Taty Givens MD  OBGYN PGY-2  8/22/2019 4:11 AM

## 2019-08-22 NOTE — PROGRESS NOTES
L&D Triage Note - OB/GYN  Wanda Orozco 30 y o  female MRN: [id number]  Unit/Bed#: L&D 320-01 Encounter: [id number]    Patient is seen by [facility]      ASSESSMENT:    Wanda Orozco is a 30 y o  Sade David at 36w3d not ruptured with fetal tachycardia vs  Prolonged acceleratiosn    PLAN:    1) Extended monitoring   2) SSE  3) Continue routine prenatal care   - Case discussed with Dr Rahul Post:    Wanda Orozco 30 y o  Sade Lainezrad at 39w1d with an Estimated Date of Delivery: 8/28/19 with 2 small episodes of leaking fluid after showering  She states that she passed her mucus plug yesterday  She has had some spotting today  She endorses good fetal movement  This when placed on fetal monitor was noted to fetal tachycardia versus prolonged accelerations  Decision made after strip was reviewed with Dr Danii Calixto due to questionable baseline to do extended monitoring and IV fluid resuscitation  Presumed after review to have baseline in the 150s with prolonged accelerations and without decelerations      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    Her past obstetrical history is significant for 1 term vaginal delivery    Contractions:  Yes, regular  Leakage of fluid:  Unsure  Vaginal Bleeding:  Spotting earlier in the day  Fetal movement: present    OBJECTIVE:    Vitals:    08/22/19 0128   Temp: 98 7 °F (37 1 °C)       ROS:  Constitutional: Negative  Respiratory: Negative  Cardiovascular: Negative    Gastrointestinal: Negative    General Physical Exam:  General: in no apparent distress, alert and oriented times 3  Cardiovascular: Cor RRR  Lungs: non-labored breathing  Abdomen: abdomen is soft without significant tenderness, masses, organomegaly or guarding  Lower extremeties: nontender    Cervical Exam  Speculum: Cervical os is visually dilated to and 0 5 cm, copious amount of white cervical mucus in the vaginal vault  SVE: 2 / 80% / -2    Fetal monitoring:  FHT:  Possibly 150 bpm/ Moderate 6 - 25 bpm / prolonged accelerations, no decelerations  [place]:  Irritability with contractions every 1 to 10 minutes    KOH/WTMT:    Membrane status   - neg ferning   - neg nitrazene   - indeterminate pooling       Kathryn Landon MD  PGY-2 OB/GYN Resident   8/22/2019 1:38 AM

## 2019-08-22 NOTE — DISCHARGE INSTRUCTIONS
Pregnancy at 44 to 40 Weeks   [address] [address] :   What changes are happening in my body? You are now getting close to your due date  Your due date is just an estimate of when your baby will be born  Your baby may be born before or after your due date  Your breathing may be easier if your baby has moved down into a head-down position  You may need to urinate more often because the baby may be pressing on your bladder  You may also feel more discomfort and tire easily  You may also be having trouble sleeping  How do I care for myself at this stage of my pregnancy? · Eat a variety of healthy foods  Healthy foods include fruits, vegetables, whole-grain breads, low-fat dairy foods, beans, lean meats, and fish  Drink liquids as directed  Ask how much liquid to drink each day and which liquids are best for you  Limit caffeine to less than 200 milligrams each day  Limit your intake of fish to 2 servings each week  Choose fish low in mercury such as canned light tuna, shrimp, crab, salmon, cod, or tilapia  Do not  eat fish high in mercury such as swordfish, tilefish, gisele mackerel, and shark  · Take prenatal vitamins as directed  Your need for certain vitamins and minerals, such as folic acid, increases during pregnancy  Prenatal vitamins provide some of the extra vitamins and minerals you need  Prenatal vitamins may also help to decrease the risk of certain birth defects  · Rest as needed  Put your feet up if you have swelling in your ankles and feet  · Do not smoke  If you smoke, it is never too late to quit  Smoking increases your risk of a miscarriage and other health problems during your pregnancy  Smoking can cause your baby to be born too early or weigh less at birth  Ask your healthcare provider for information if you need help quitting  · Do not drink alcohol  Alcohol passes from your body to your baby through the placenta   It can affect your baby's brain development and cause fetal alcohol syndrome (FAS)  FAS is a group of conditions that causes mental, behavior, and growth problems  · Talk to your healthcare provider before you take any medicines  Many medicines may harm your baby if you take them when you are pregnant  Do not take any medicines, vitamins, herbs, or supplements without first talking to your healthcare provider  Never use illegal or street drugs (such as marijuana or cocaine) while you are pregnant  · Talk to your healthcare provider before you travel  You may not be able to travel in an airplane after 36 weeks  He may also recommend that you avoid long road trips  What are some safety tips during pregnancy? · Avoid hot tubs and saunas  Do not use a hot tub or sauna while you are pregnant, especially during your first trimester  Hot tubs and saunas may raise your baby's temperature and increase the risk of birth defects  · Avoid toxoplasmosis  This is an infection caused by eating raw meat or being around infected cat feces  It can cause birth defects, miscarriages, and other problems  Wash your hands after you touch raw meat  Make sure any meat is well-cooked before you eat it  Avoid raw eggs and unpasteurized milk  Use gloves or ask someone else to clean your cat's litter box while you are pregnant  · Ask your healthcare provider about travel  The most comfortable time to travel is during the second trimester  Ask your healthcare provider if you can travel after 36 weeks  You may not be able to travel in an airplane after 36 weeks  He may also recommend that you avoid long road trips  What changes are happening with my baby? Your baby is ready to be born  At birth, your baby may weigh about 6 to 9 pounds and be about 19 to 21 inches long  Your baby may be in a head-down position  Your baby will also rest lower in your abdomen  What do I need to know about prenatal care? Your healthcare provider will check your blood pressure and weight   You may also need the following:  · A urine test  may also be done to check for sugar and protein  These can be signs of gestational diabetes or infection  Protein in your urine may also be a sign of preeclampsia  Preeclampsia is a condition that can develop during week 20 or later of your pregnancy  It causes high blood pressure, and it can cause problems with your kidneys and other organs  · Your baby's heart rate  will be checked  When should I seek immediate care? · You develop a severe headache that does not go away  · You have new or increased vision changes, such as blurred or spotted vision  · You have new or increased swelling in your face or hands  · You have vaginal spotting or bleeding  · Your water broke or you feel warm water gushing or trickling from your vagina  When should I contact my healthcare provider? · You have more than 5 contractions in 1 hour  · You notice any changes in your baby's movements  · You have abdominal cramps, pressure, or tightening  · You have a change in vaginal discharge  · You have chills or a fever  · You have vaginal itching, burning, or pain  · You have yellow, green, white, or foul-smelling vaginal discharge  · You have pain or burning when you urinate, less urine than usual, or pink or bloody urine  · You have questions or concerns about your condition or care  CARE AGREEMENT:   You have the right to help plan your care  Learn about your health condition and how it may be treated  Discuss treatment options with your caregivers to decide what care you want to receive  You always have the right to refuse treatment  The above information is an  only  It is not intended as medical advice for individual conditions or treatments  Talk to your doctor, nurse or pharmacist before following any medical regimen to see if it is safe and effective for you    © 2017 [employer] Information is for End User's use only and may not be sold, redistributed or otherwise used for commercial purposes  All illustrations and images included in CareNotes® are the copyrighted property of A D A M , Inc  or Mike Fajardo

## 2019-08-23 ENCOUNTER — ANESTHESIA (INPATIENT)
Dept: ANESTHESIOLOGY | Facility: HOSPITAL | Age: 24
End: 2019-08-23
Payer: COMMERCIAL

## 2019-08-23 ENCOUNTER — HOSPITAL ENCOUNTER (INPATIENT)
Facility: HOSPITAL | Age: 24
LOS: 1 days | Discharge: HOME | End: 2019-08-24
Attending: OBSTETRICS & GYNECOLOGY | Admitting: OBSTETRICS & GYNECOLOGY
Payer: COMMERCIAL

## 2019-08-23 ENCOUNTER — ANESTHESIA EVENT (INPATIENT)
Dept: ANESTHESIOLOGY | Facility: HOSPITAL | Age: 24
End: 2019-08-23
Payer: COMMERCIAL

## 2019-08-23 ENCOUNTER — HOSPITAL ENCOUNTER (OUTPATIENT)
Dept: LABOR AND DELIVERY | Facility: HOSPITAL | Age: 24
Discharge: HOME | End: 2019-08-23
Payer: COMMERCIAL

## 2019-08-23 DIAGNOSIS — Z3A.39 39 WEEKS GESTATION OF PREGNANCY: Primary | ICD-10-CM

## 2019-08-23 PROBLEM — B00.9 HERPES SIMPLEX VIRUS TYPE 2 (HSV-2) INFECTION AFFECTING PREGNANCY IN THIRD TRIMESTER: Status: ACTIVE | Noted: 2019-08-23

## 2019-08-23 PROBLEM — O98.513 HERPES SIMPLEX VIRUS TYPE 2 (HSV-2) INFECTION AFFECTING PREGNANCY IN THIRD TRIMESTER: Status: ACTIVE | Noted: 2019-08-23

## 2019-08-23 LAB
ABO GROUP BLD: NORMAL
BASE EXCESS BLDCOV CALC-SCNC: -5.5 MMOL/L (ref 1–9)
BLD GP AB SCN SERPL QL: NEGATIVE
ERYTHROCYTE [DISTWIDTH] IN BLOOD BY AUTOMATED COUNT: 14.4 % (ref 11.6–15.1)
HCO3 BLDCOV-SCNC: 22.4 MMOL/L (ref 12.2–28.6)
HCT VFR BLD AUTO: 34.3 % (ref 34.8–46.1)
HGB BLD-MCNC: 11.4 G/DL (ref 11.5–15.4)
MCH RBC QN AUTO: 30.3 PG (ref 26.8–34.3)
MCHC RBC AUTO-ENTMCNC: 33.2 G/DL (ref 31.4–37.4)
MCV RBC AUTO: 91 FL (ref 82–98)
OXYHGB MFR BLDCOV: 54.6 %
PCO2 BLDCOV: 52.1 MM HG (ref 27–43)
PH BLDCOV: 7.25 [PH] (ref 7.19–7.49)
PLATELET # BLD AUTO: 201 THOUSANDS/UL (ref 149–390)
PMV BLD AUTO: 10.4 FL (ref 8.9–12.7)
PO2 BLDCOV: 26.2 MM HG (ref 15–45)
RBC # BLD AUTO: 3.76 MILLION/UL (ref 3.81–5.12)
RH BLD: POSITIVE
RPR SER QL: NORMAL
SAO2 % BLDCOV: 13.6 ML/DL
SPECIMEN EXPIRATION DATE: NORMAL
WBC # BLD AUTO: 17.32 THOUSAND/UL (ref 4.31–10.16)

## 2019-08-23 PROCEDURE — 86901 BLOOD TYPING SEROLOGIC RH(D): CPT | Performed by: OBSTETRICS & GYNECOLOGY

## 2019-08-23 PROCEDURE — 4A1HXCZ MONITORING OF PRODUCTS OF CONCEPTION, CARDIAC RATE, EXTERNAL APPROACH: ICD-10-PCS | Performed by: OBSTETRICS & GYNECOLOGY

## 2019-08-23 PROCEDURE — 3E033VJ INTRODUCTION OF OTHER HORMONE INTO PERIPHERAL VEIN, PERCUTANEOUS APPROACH: ICD-10-PCS | Performed by: OBSTETRICS & GYNECOLOGY

## 2019-08-23 PROCEDURE — 86850 RBC ANTIBODY SCREEN: CPT | Performed by: OBSTETRICS & GYNECOLOGY

## 2019-08-23 PROCEDURE — 82805 BLOOD GASES W/O2 SATURATION: CPT | Performed by: OBSTETRICS & GYNECOLOGY

## 2019-08-23 PROCEDURE — 0HQ9XZZ REPAIR PERINEUM SKIN, EXTERNAL APPROACH: ICD-10-PCS | Performed by: OBSTETRICS & GYNECOLOGY

## 2019-08-23 PROCEDURE — 85027 COMPLETE CBC AUTOMATED: CPT | Performed by: OBSTETRICS & GYNECOLOGY

## 2019-08-23 PROCEDURE — 86900 BLOOD TYPING SEROLOGIC ABO: CPT | Performed by: OBSTETRICS & GYNECOLOGY

## 2019-08-23 PROCEDURE — 86592 SYPHILIS TEST NON-TREP QUAL: CPT | Performed by: OBSTETRICS & GYNECOLOGY

## 2019-08-23 RX ORDER — ROPIVACAINE HYDROCHLORIDE 2 MG/ML
INJECTION, SOLUTION EPIDURAL; INFILTRATION; PERINEURAL
Status: COMPLETED
Start: 2019-08-23 | End: 2019-08-23

## 2019-08-23 RX ORDER — IBUPROFEN 600 MG/1
600 TABLET ORAL EVERY 6 HOURS PRN
Status: DISCONTINUED | OUTPATIENT
Start: 2019-08-23 | End: 2019-08-23

## 2019-08-23 RX ORDER — DIPHENHYDRAMINE HCL 25 MG
25 TABLET ORAL EVERY 6 HOURS PRN
Status: DISCONTINUED | OUTPATIENT
Start: 2019-08-23 | End: 2019-08-24 | Stop reason: HOSPADM

## 2019-08-23 RX ORDER — OXYTOCIN/RINGER'S LACTATE 30/500 ML
1-30 PLASTIC BAG, INJECTION (ML) INTRAVENOUS
Status: DISCONTINUED | OUTPATIENT
Start: 2019-08-23 | End: 2019-08-24 | Stop reason: HOSPADM

## 2019-08-23 RX ORDER — BISACODYL 10 MG
10 SUPPOSITORY, RECTAL RECTAL DAILY PRN
Status: DISCONTINUED | OUTPATIENT
Start: 2019-08-23 | End: 2019-08-24 | Stop reason: HOSPADM

## 2019-08-23 RX ORDER — ONDANSETRON 2 MG/ML
4 INJECTION INTRAMUSCULAR; INTRAVENOUS EVERY 8 HOURS PRN
Status: DISCONTINUED | OUTPATIENT
Start: 2019-08-23 | End: 2019-08-24 | Stop reason: HOSPADM

## 2019-08-23 RX ORDER — DIAPER,BRIEF,INFANT-TODD,DISP
1 EACH MISCELLANEOUS AS NEEDED
Status: DISCONTINUED | OUTPATIENT
Start: 2019-08-23 | End: 2019-08-24 | Stop reason: HOSPADM

## 2019-08-23 RX ORDER — ROPIVACAINE HYDROCHLORIDE 2 MG/ML
INJECTION, SOLUTION EPIDURAL; INFILTRATION; PERINEURAL CONTINUOUS PRN
Status: DISCONTINUED | OUTPATIENT
Start: 2019-08-23 | End: 2019-08-23 | Stop reason: SURG

## 2019-08-23 RX ORDER — ACETAMINOPHEN 325 MG/1
975 TABLET ORAL EVERY 6 HOURS PRN
Status: DISCONTINUED | OUTPATIENT
Start: 2019-08-23 | End: 2019-08-24 | Stop reason: HOSPADM

## 2019-08-23 RX ORDER — ONDANSETRON 2 MG/ML
4 INJECTION INTRAMUSCULAR; INTRAVENOUS EVERY 6 HOURS PRN
Status: DISCONTINUED | OUTPATIENT
Start: 2019-08-23 | End: 2019-08-23

## 2019-08-23 RX ORDER — OXYCODONE HYDROCHLORIDE 5 MG/1
5 TABLET ORAL EVERY 4 HOURS PRN
Status: DISCONTINUED | OUTPATIENT
Start: 2019-08-23 | End: 2019-08-23

## 2019-08-23 RX ORDER — SIMETHICONE 80 MG
80 TABLET,CHEWABLE ORAL 4 TIMES DAILY PRN
Status: DISCONTINUED | OUTPATIENT
Start: 2019-08-23 | End: 2019-08-24 | Stop reason: HOSPADM

## 2019-08-23 RX ORDER — VALACYCLOVIR HYDROCHLORIDE 500 MG/1
500 TABLET, FILM COATED ORAL 2 TIMES DAILY
Status: DISCONTINUED | OUTPATIENT
Start: 2019-08-23 | End: 2019-08-24 | Stop reason: HOSPADM

## 2019-08-23 RX ORDER — ACETAMINOPHEN 325 MG/1
650 TABLET ORAL EVERY 4 HOURS PRN
Status: DISCONTINUED | OUTPATIENT
Start: 2019-08-23 | End: 2019-08-23

## 2019-08-23 RX ORDER — ACETAMINOPHEN 325 MG/1
650 TABLET ORAL EVERY 6 HOURS PRN
Status: DISCONTINUED | OUTPATIENT
Start: 2019-08-23 | End: 2019-08-24 | Stop reason: HOSPADM

## 2019-08-23 RX ORDER — ROPIVACAINE HYDROCHLORIDE 2 MG/ML
INJECTION, SOLUTION EPIDURAL; INFILTRATION; PERINEURAL
Status: COMPLETED | OUTPATIENT
Start: 2019-08-23 | End: 2019-08-23

## 2019-08-23 RX ORDER — DOCUSATE SODIUM 100 MG/1
100 CAPSULE, LIQUID FILLED ORAL 2 TIMES DAILY
Status: DISCONTINUED | OUTPATIENT
Start: 2019-08-23 | End: 2019-08-24 | Stop reason: HOSPADM

## 2019-08-23 RX ORDER — ACETAMINOPHEN 325 MG/1
325 TABLET ORAL EVERY 4 HOURS PRN
Status: DISCONTINUED | OUTPATIENT
Start: 2019-08-23 | End: 2019-08-24 | Stop reason: HOSPADM

## 2019-08-23 RX ORDER — SODIUM CHLORIDE, SODIUM LACTATE, POTASSIUM CHLORIDE, CALCIUM CHLORIDE 600; 310; 30; 20 MG/100ML; MG/100ML; MG/100ML; MG/100ML
125 INJECTION, SOLUTION INTRAVENOUS CONTINUOUS
Status: DISCONTINUED | OUTPATIENT
Start: 2019-08-23 | End: 2019-08-23

## 2019-08-23 RX ORDER — CALCIUM CARBONATE 200(500)MG
1000 TABLET,CHEWABLE ORAL DAILY PRN
Status: DISCONTINUED | OUTPATIENT
Start: 2019-08-23 | End: 2019-08-24 | Stop reason: HOSPADM

## 2019-08-23 RX ORDER — IBUPROFEN 600 MG/1
600 TABLET ORAL EVERY 6 HOURS PRN
Status: DISCONTINUED | OUTPATIENT
Start: 2019-08-23 | End: 2019-08-24 | Stop reason: HOSPADM

## 2019-08-23 RX ADMIN — Medication 2 MILLI-UNITS/MIN: at 10:00

## 2019-08-23 RX ADMIN — ROPIVACAINE HYDROCHLORIDE 10 ML: 2 INJECTION, SOLUTION EPIDURAL; INFILTRATION at 11:50

## 2019-08-23 RX ADMIN — SODIUM CHLORIDE, SODIUM LACTATE, POTASSIUM CHLORIDE, AND CALCIUM CHLORIDE 999 ML/HR: .6; .31; .03; .02 INJECTION, SOLUTION INTRAVENOUS at 12:26

## 2019-08-23 RX ADMIN — BENZOCAINE AND LEVOMENTHOL: 200; 5 SPRAY TOPICAL at 13:57

## 2019-08-23 RX ADMIN — DOCUSATE SODIUM 100 MG: 100 CAPSULE, LIQUID FILLED ORAL at 17:10

## 2019-08-23 RX ADMIN — ROPIVACAINE HYDROCHLORIDE: 2 INJECTION, SOLUTION EPIDURAL; INFILTRATION at 11:50

## 2019-08-23 RX ADMIN — WITCH HAZEL 1 PAD: 500 SOLUTION RECTAL; TOPICAL at 13:57

## 2019-08-23 RX ADMIN — ROPIVACAINE HYDROCHLORIDE 10 ML/HR: 2 INJECTION, SOLUTION EPIDURAL; INFILTRATION at 11:50

## 2019-08-23 RX ADMIN — ACETAMINOPHEN 975 MG: 325 TABLET ORAL at 19:16

## 2019-08-23 RX ADMIN — SODIUM CHLORIDE, SODIUM LACTATE, POTASSIUM CHLORIDE, AND CALCIUM CHLORIDE 125 ML/HR: .6; .31; .03; .02 INJECTION, SOLUTION INTRAVENOUS at 10:00

## 2019-08-23 RX ADMIN — IBUPROFEN 600 MG: 600 TABLET ORAL at 13:57

## 2019-08-23 RX ADMIN — VALACYCLOVIR HYDROCHLORIDE 500 MG: 500 TABLET, FILM COATED ORAL at 13:58

## 2019-08-23 NOTE — ANESTHESIA POSTPROCEDURE EVALUATION
Post-Op Assessment Note    CV Status:  Stable  Pain Score: 2    Pain management: adequate     Mental Status:  Alert and awake   Hydration Status:  Euvolemic   PONV Controlled:  Controlled   Airway Patency:  Patent   Post Op Vitals Reviewed: Yes      Staff: Anesthesiologist     Post-op block assessment: no complications        BP      Temp      Pulse    Resp      SpO2

## 2019-08-23 NOTE — PLAN OF CARE
Problem: BIRTH - VAGINAL/ SECTION  Goal: Fetal and maternal status remain reassuring during the birth process  Description  INTERVENTIONS:  - Monitor vital signs  - Monitor fetal heart rate  - Monitor uterine activity  - Monitor labor progression (vaginal delivery)  - DVT prophylaxis  - Antibiotic prophylaxis  Outcome: Progressing  Goal: Emotionally satisfying birthing experience for mother/fetus  Description  Interventions:  - Assess, plan, implement and evaluate the nursing care given to the patient in labor  - Advocate the philosophy that each childbirth experience is a unique experience and support the family's chosen level of involvement and control during the labor process   - Actively participate in both the patient's and family's teaching of the birth process  - Consider cultural, Restorationism and age-specific factors and plan care for the patient in labor  Outcome: Progressing     Problem: PAIN - ADULT  Goal: Verbalizes/displays adequate comfort level or baseline comfort level  Description  Interventions:  - Encourage patient to monitor pain and request assistance  - Assess pain using appropriate pain scale  - Administer analgesics based on type and severity of pain and evaluate response  - Implement non-pharmacological measures as appropriate and evaluate response  - Consider cultural and social influences on pain and pain management  - Notify physician/advanced practitioner if interventions unsuccessful or patient reports new pain  Outcome: Progressing     Problem: INFECTION - ADULT  Goal: Absence or prevention of progression during hospitalization  Description  INTERVENTIONS:  - Assess and monitor for signs and symptoms of infection  - Monitor lab/diagnostic results  - Monitor all insertion sites, i e  indwelling lines, tubes, and drains  - Monitor endotracheal if appropriate and nasal secretions for changes in amount and color  - [place] appropriate cooling/warming therapies per order  - Administer medications as ordered  - Instruct and encourage patient and family to use good hand hygiene technique  - Identify and instruct in appropriate isolation precautions for identified infection/condition  Outcome: Progressing  Goal: Absence of fever/infection during neutropenic period  Description  INTERVENTIONS:  - Monitor WBC    Outcome: Progressing     Problem: SAFETY ADULT  Goal: Patient will remain free of falls  Description  INTERVENTIONS:  - Assess patient frequently for physical needs  -  Identify cognitive and physical deficits and behaviors that affect risk of falls    -  [place] fall precautions as indicated by assessment   - Educate patient/family on patient safety including physical limitations  - Instruct patient to call for assistance with activity based on assessment  - Modify environment to reduce risk of injury  - Consider OT/PT consult to assist with strengthening/mobility  Outcome: Progressing  Goal: Maintain or return to baseline ADL function  Description  INTERVENTIONS:  -  Assess patient's ability to carry out ADLs; assess patient's baseline for ADL function and identify physical deficits which impact ability to perform ADLs (bathing, care of mouth/teeth, toileting, grooming, dressing, etc )  - Assess/evaluate cause of self-care deficits   - Assess range of motion  - Assess patient's mobility; develop plan if impaired  - Assess patient's need for assistive devices and provide as appropriate  - Encourage maximum independence but intervene and supervise when necessary  - Involve family in performance of ADLs  - Assess for home care needs following discharge   - Consider OT consult to assist with ADL evaluation and planning for discharge  - Provide patient education as appropriate  Outcome: Progressing  Goal: Maintain or return mobility status to optimal level  Description  INTERVENTIONS:  - Assess patient's baseline mobility status (ambulation, transfers, stairs, etc )    - Identify cognitive and physical deficits and behaviors that affect mobility  - Identify mobility aids required to assist with transfers and/or ambulation (gait belt, sit-to-stand, lift, walker, cane, etc )  - [place] fall precautions as indicated by assessment  - Record patient progress and toleration of activity level on Mobility SBAR; progress patient to next Phase/Stage  - Instruct patient to call for assistance with activity based on assessment  - Consider rehabilitation consult to assist with strengthening/weightbearing, etc   Outcome: Progressing     Problem: Knowledge Deficit  Goal: Patient/family/caregiver demonstrates understanding of disease process, treatment plan, medications, and discharge instructions  Description  Complete learning assessment and assess knowledge base    Interventions:  - Provide teaching at level of understanding  - Provide teaching via preferred learning methods  Outcome: Progressing     Problem: DISCHARGE PLANNING  Goal: Discharge to home or other facility with appropriate resources  Description  INTERVENTIONS:  - Identify barriers to discharge w/patient and caregiver  - Arrange for needed discharge resources and transportation as appropriate  - Identify discharge learning needs (meds, wound care, etc )  - Arrange for interpretive services to assist at discharge as needed  - Refer to Case Management Department for coordinating discharge planning if the patient needs post-hospital services based on physician/advanced practitioner order or complex needs related to functional status, cognitive ability, or social support system  Outcome: Progressing

## 2019-08-23 NOTE — OB LABOR/OXYTOCIN SAFETY PROGRESS
Oxytocin Safety Progress Check Note - Abebe Ojeda 30 y o  female MRN: [id number]    Unit/Bed#: L&D 326-01 Encounter: [id number]    OB History    Para Term  AB Living   3 1 1 0 1 1   SAB TAB Ectopic Multiple Live Births   0 0 0 0 1     Gestational Age: 39w2d  Dose (marlene-units/min) Oxytocin: 4 marlene-units/min  Contraction Frequency (minutes): indistinct  Contraction Quality: Moderate  Tachysystole: No   Dilation: 7        Effacement (%): 80  Station: -1  Baseline Rate: 145 bpm  Fetal Heart Rate: 145 BPM  FHR Category: Category II          Notes/comments:   Checked patient after epidural placed, patient more comfortable  Started having recurrent variable decelerations  Found to be 7cm dilated  Given that patient had >15 minutes of recurrent variable decelerations, attempted to place [facility], but patient was found to have made rapid change to 9 5cm dilated  Will continue to monitor and start pushing when complete      Discussed with Dr Placido Anderson MD 2019 12:16 PM

## 2019-08-23 NOTE — DISCHARGE INSTRUCTIONS
Vaginal Delivery   AMBULATORY CARE:   What you need to know about vaginal delivery:  A vaginal delivery occurs when your baby is born through your vagina (birth canal)  How to prepare for vaginal delivery: You will not be able to eat while you are in active labor  Your healthcare provider can give you medicines for pain relief if you chose to have them  You may need medicine to induce (start) your labor if your labor is not moving forward  You may need to move in bed, stand, or walk to help your baby move into position for birth  What will happen during vaginal delivery:   · You can move into several positions during delivery  You can lie on your back, have your feet up in stirrups, or squat  You may feel pressure on your rectum  This pressure is caused by the movement of your baby's head down the birth canal  You may feel the urge to push  Your healthcare provider will tell you when to push, and guide your baby out of the birth canal  Healthcare providers may use forceps or suction to help deliver your baby  You may also need an episiotomy (incision) to make the vaginal opening larger  This will make more room for your baby  · After your baby is born, your healthcare provider will put clamps on the cord that connects your baby to the placenta  The cord is then cut  Your uterus continues to contract after delivery to push out the placenta  Your healthcare provider may close your episiotomy incision or any tears with stitches  What will happen after vaginal delivery:   · Healthcare providers will examine your baby  You may be able to hold your baby soon after he or she is born  After healthcare providers have checked that you and your baby are okay, you may be taken to another room  · A healthcare provider may massage your abdomen several times to make your uterus firm  This can be uncomfortable   You may have abdominal pains for up to 3 days after you give birth because your uterus is still lisa  The contractions help release blood from inside your uterus so it shrinks back to its normal size  These contractions may hurt more while you breastfeed your baby  · Your healthcare provider may suggest you get out of bed to sit in a chair or walk  Activity can help prevent blood clots  · You may be able to go home within 24 to 48 hours after delivery  If you need support at home, ask your healthcare provider about home visits by another healthcare provider  This healthcare provider can help you learn about breastfeeding, bottle feeding, baby care, and perineum care  Follow up with your healthcare provider:  Most women need to return 6 weeks after a vaginal delivery  Ask your healthcare provider how to care for your wounds or stitches, if you have them  Write down your questions so you remember to ask them during your visits  Seek care immediately if:   · Your leg feels warm, tender, and painful  It may look swollen and red  · You have a fever  · You are urinating very little, or not at all  · You have heavy vaginal bleeding that fills 1 or more sanitary pads in 1 hour  · You feel weak, dizzy, or faint  Contact your healthcare provider if:   · Your abdominal or perineal pain does not go away, or gets worse  · You feel depressed  · You have questions or concerns about your condition or care  Medicines:  · NSAIDs , such as ibuprofen, help decrease swelling, pain, and fever  This medicine is available with or without a doctor's order  NSAIDs can cause stomach bleeding or kidney problems in certain people  If you take blood thinner medicine, always ask your healthcare provider if NSAIDs are safe for you  Always read the medicine label and follow directions  · Stool softeners  make it easier for you to have a bowel movement  You may need this medicine to treat or prevent constipation  · Take your medicine as directed    Contact your healthcare provider if you think your medicine is not helping or if you have side effects  Tell him or her if you are allergic to any medicine  Keep a list of the medicines, vitamins, and herbs you take  Include the amounts, and when and why you take them  Bring the list or the pill bottles to follow-up visits  Carry your medicine list with you in case of an emergency  Activity:  Rest as much as possible  Try to keep all activities short  You may be able to do some exercise soon after you have your baby  Talk with your healthcare provider before you start exercising  If you work outside the home, ask when you can return to your job  Kegel exercises:  Kegel exercises may help your vaginal and rectal muscles heal faster  You can do Kegel exercises by tightening and relaxing the muscles around your vagina  Kegel exercises help make the muscles stronger  Breast care:  When your milk comes in, your breasts may feel full and hard  Ask how to care for your breasts, even if you are not breastfeeding  Constipation:  You may have constipation for a period of time after you have your baby  Do not try to push the bowel movement out if it is too hard  High-fiber foods and extra liquids can help you prevent constipation  Examples of high-fiber foods are fruit and bran  Prune juice and water are good liquids to drink  You may also be told to take over-the-counter fiber and stool softener medicines  Take these items as directed  Ask how to prevent or treat hemorrhoids  Perineum care: Your perineum is the area between your vagina and anus  Keep the area clean and dry  This will help it heal and prevent infection  Wash the area gently with soap and water when you bathe or shower  Rinse your perineum with warm water after you urinate or have a bowel movement  Your healthcare provider may suggest you use a warm sitz bath to help decrease pain  To take a sitz bath, fill a bathtub with 4 to 6 inches of warm water   You may also use a sitz bath pan that fits inside the toilet  Sit in the sitz bath for 20 minutes  Do this 2 to 3 times a day, or as directed  The warm water can help decrease pain and swelling  Vaginal discharge: You will have vaginal discharge, called lochia, after your delivery  The lochia is red or dark brown with clots for 1 to 3 days after the birth  The amount will decrease and turn pale pink or brown for 3 to 10 days  It will turn white or yellow on the 10th or 14th day  Lochia is usually gone within 3 weeks  Use a sanitary pad rather than a tampon to prevent a vaginal infection  You will have lochia for up to 3 weeks after your baby is born  Monthly periods: Your period may start again within 7 to 9 weeks after your baby is born  If you are breastfeeding, it may take longer for your period to start again  You can still get pregnant again even though you do not have your monthly period  Talk with your healthcare provider about a birth control method if you do not want to get pregnant  Mood changes: Many new mothers have some kind of mood changes after delivery  Some of these changes occur because of lack of sleep, hormone changes, and caring for a new baby  Some mood changes can be more serious, such as postpartum depression  Talk with your healthcare provider if you feel unable to care for yourself or your baby  Sexual activity:  Do not have sex until your healthcare provider says it is okay  You may notice you have a decreased desire for sex, or sex may be painful  You may need to use a vaginal lubricant (gel) to help make sex more comfortable  © 2017 [employer]  Information is for End User's use only and may not be sold, redistributed or otherwise used for commercial purposes  All illustrations and images included in CareNotes® are the copyrighted property of [employer]  or Mike Fajardo  The above information is an  only  It is not intended as medical advice for individual conditions or treatments   Talk to your doctor, nurse or pharmacist before following any medical regimen to see if it is safe and effective for you  Postpartum Bleeding   WHAT YOU NEED TO KNOW:   Postpartum bleeding is vaginal bleeding after childbirth  This bleeding is normal, whether your baby was born vaginally or by   It contains blood and the tissue that lined the inside of your uterus when you were pregnant  DISCHARGE INSTRUCTIONS:   What to expect with postpartum bleeding:  Postpartum bleeding usually lasts at least 10 days, and may last longer than 6 weeks  Your bleeding may range from light (barely staining a pad) to heavy (soaking a pad in 1 hour)  Usually, you have heavy bleeding right after childbirth, which slows over the next few weeks until it stops  The bleeding is red or dark brown with clots for the first 1 to 3 days  It then turns pink for several days, and then becomes a white or yellow discharge until it ends  Follow up with your obstetrician as directed:  Do not have sex until your obstetrician says it is okay  Write down your questions so you remember to ask them during your visits  Contact your healthcare provider or obstetrician if:   · Your bleeding increases, or you have heavy bleeding that soaks 1 pad in 1 hour for 2 hours in a row  · You pass large blood clots  · You are breathing faster than normal, or your heart is beating faster than normal     · You are urinating less than usual, or not at all  · You feel dizzy  · You have questions or concerns about your condition or care  Return to the emergency department if:   · You are suddenly short of breath and feel lightheaded  · You have sudden chest pain  ©  [employer]  Information is for End User's use only and may not be sold, redistributed or otherwise used for commercial purposes  All illustrations and images included in CareNotes® are the copyrighted property of [employer]  or Mike Fajardo    The above information is an  only  It is not intended as medical advice for individual conditions or treatments  Talk to your doctor, nurse or pharmacist before following any medical regimen to see if it is safe and effective for you

## 2019-08-23 NOTE — H&P
H&P Exam - Obstetrics   Payton West 30 y o  female MRN: [id number]  Unit/Bed#: L&D 326-01 Encounter: [id number]    Assessment/Plan     Assessment:  31yo  at 44w2d who is being admitted for elective induction of labor  Cervical exam now 3/80%/-2  Plan:  1  Admit to L&D  2  CBC, RPR, T&S  3  GBS status negative, will not need antibiotics  4  Analgesia and or epidural per patient preference  5  Anticipate   6  Plan to start pitocin titration    HSV2:  1  Continue Valtrex    Discussed with Dr Willa Herring      History of Present Illness   Chief Complaint: elective induction of labor    HPI:  Payton West is a 30 y o   female with an JONATHAN of 2019, by Patient Reported at 39w2d weeks gestation who is being admitted for elective induction of labor  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 on valtrex  Contractions: Date/time of onset: 19 and Frequency: Every 10-15 minutes  Leakage of fluid: None  Bleeding: None  Fetal movement: present  Pregnancy complications: HSV on valtrex  Review of Systems   Constitutional: Negative for fever  HENT: Negative for rhinorrhea, sinus pressure, sneezing and sore throat  Eyes: Negative for visual disturbance  Respiratory: Negative for cough, shortness of breath and wheezing  Cardiovascular: Negative for chest pain, palpitations and leg swelling  Gastrointestinal: Negative for abdominal distention, abdominal pain, blood in stool, nausea and vomiting  Genitourinary: Negative for dysuria, flank pain, vaginal bleeding and vaginal discharge  Musculoskeletal: Negative for neck pain and neck stiffness  Skin: Negative for color change, pallor and rash  Neurological: Negative for light-headedness, numbness and headaches         Historical Information   OB History    Para Term  AB Living   2 1 1     1   SAB TAB Ectopic Multiple Live Births         0 1      # Outcome Date GA Lbr Jsoe/2nd Weight Sex Delivery Anes PTL Lv   2 Current            1 Term 17 39w5d / 03:55 3090 g (6 lb 13 oz) M Vag-Spont EPI N SABINO     Baby complications/comments: none  No past medical history on file  Past Surgical History:   Procedure Laterality Date    NO PAST SURGERIES       Social History   Social History     Substance and Sexual Activity   Alcohol Use No     Social History     Substance and Sexual Activity   Drug Use No     Social History     Tobacco Use   Smoking Status Never Smoker   Smokeless Tobacco Never Used     Family History: non-contributory    Meds/Allergies   PTA meds:   Prior to Admission Medications   Prescriptions Last Dose Informant Patient Reported? Taking? Prenatal Vit-DSS-Fe Fum-FA (PRENATAL 19) 29-1 MG TABS   Yes No   Sig: Take by mouth   Probiotic Product (PROBIOTIC-10 PO)   Yes No   Sig: Probiotic   fluticasone (FLONASE) 50 mcg/act nasal spray   No No   Si spray into each nostril daily   Patient not taking: Reported on 3/21/2019   valACYclovir (VALTREX) 500 mg tablet   Yes No   Sig: Take 500 mg by mouth 2 (two) times a day      Facility-Administered Medications: None     Allergies   Allergen Reactions    Other Allergic Rhinitis     Seasonal        Objective   Vitals: not currently breastfeeding  There is no height or weight on file to calculate BMI  Invasive Devices     None                 Physical Exam   Constitutional: She appears well-developed and well-nourished  No distress  HENT:   Head: Normocephalic and atraumatic  Eyes: Conjunctivae and EOM are normal    Neck: Normal range of motion  Cardiovascular: Intact distal pulses  Pulmonary/Chest: Effort normal  No respiratory distress  She exhibits no tenderness  Abdominal: Soft  She exhibits no distension  There is no tenderness  There is no rebound and no guarding  Musculoskeletal: Normal range of motion  She exhibits no edema  Neurological: She is alert  Skin: Skin is warm and dry  She is not diaphoretic  No erythema     Psychiatric: She has a normal mood and affect  Her behavior is normal      SVE: 3/80/-2  TAUS: Vertex    Prenatal Labs:   Blood Type:   Lab Results   Component Value Date/Time    ABO Grouping O 08/22/2019 01:48 AM     , D (Rh type):   Lab Results   Component Value Date/Time    Rh Factor Positive 08/22/2019 01:48 AM     , Antibody Screen: Negative   , HCT/HGB:   Lab Results   Component Value Date/Time    Hematocrit 33 9 (L) 08/22/2019 01:48 AM    Hemoglobin 11 2 (L) 08/22/2019 01:48 AM      , MCV:   Lab Results   Component Value Date/Time    MCV 91 08/22/2019 01:48 AM      , Platelets:   Lab Results   Component Value Date/Time    Platelets 694 96/62/0392 01:48 AM      , 1 hour Glucola:   Lab Results   Component Value Date/Time    Glucose 91 06/05/2019 08:11 PM   Varicella:   Lab Results   Component Value Date/Time    Varicella IgG IMMUNE 03/21/2019 04:36 PM       , Rubella:   Lab Results   Component Value Date/Time    Rubella IgG Quant 24 3 03/21/2019 04:36 PM        , VDRL/RPR:   Lab Results   Component Value Date/Time    RPR Non-Reactive 08/22/2019 01:48 AM      , Urine Culture/Screen: Negative,   , Hep B:   Lab Results   Component Value Date/Time    Hepatitis B Surface Ag Non-reactive 03/21/2019 04:36 PM     , HIV:   Lab Results   Component Value Date/Time    HIV-1/HIV-2 Ab Non-Reactive 03/21/2019 04:36 PM     , Chlamydia:   Lab Results   Component Value Date/Time    External Chlamydia Screen Negative 02/14/2019     , Gonorrhea:   Lab Results   Component Value Date/Time    N gonorrhoeae, DNA Probe Negative 02/14/2019 04:55 PM     , Group B Strep:    Lab Results   Component Value Date/Time    Strep Grp B PCR Negative for Beta Hemolytic Strep Grp B by PCR 08/06/2019 03:00 PM          Imaging, EKG, Pathology, and Other Studies: I have personally reviewed pertinent reports

## 2019-08-23 NOTE — LACTATION NOTE
This note was copied from a baby's chart  Assisted mom with breastfeeding  Demo  cross cradle hold,how to hand express and how to get a deep latch  Baby latched well   Enc her to call for assistance as needed,phone # given

## 2019-08-23 NOTE — OB LABOR/OXYTOCIN SAFETY PROGRESS
Labor Progress Note - Dawn Glaser 30 y o  female MRN: [id number]    Unit/Bed#: L&D 326-01 Encounter: [id number]    OB History    Para Term  AB Living   3 1 1 0 1 1   SAB TAB Ectopic Multiple Live Births   0 0 0 0 1     Gestational Age: 44w2d        Contraction Quality: Mild  Tachysystole: No   Dilation: 3        Effacement (%): 80  Station: -2     Fetal Heart Rate: 145 BPM  FHR Category: Category I          Notes/comments: Will begin pitocin for induction          Hill Crane DO 2019 9:34 AM

## 2019-08-23 NOTE — ANESTHESIA PREPROCEDURE EVALUATION
Review of Systems/Medical History  Patient summary reviewed  Chart reviewed      Cardiovascular  Exercise tolerance (METS): >4,     Pulmonary  Negative pulmonary ROS        GI/Hepatic  Negative GI/hepatic ROS          Negative  ROS        Endo/Other  Negative endo/other ROS      GYN  Negative gynecology ROS          Hematology  Negative hematology ROS      Musculoskeletal  Negative musculoskeletal ROS        Neurology  Negative neurology ROS      Psychology   Negative psychology ROS              Physical Exam    Airway    Mallampati score: II  TM Distance: <3 FB  Neck ROM: full     Dental       Cardiovascular  Rhythm: regular, Rate: normal,     Pulmonary  Breath sounds clear to auscultation,     Other Findings        Anesthesia Plan  ASA Score- 2     Anesthesia Type- epidural with ASA Monitors  Additional Monitors:   Airway Plan:         Plan Factors- Patient instructed to abstain from smoking on day of procedure  Patient did not smoke on day of surgery  Induction- intravenous  Postoperative Plan-     Informed Consent- Anesthetic plan and risks discussed with patient

## 2019-08-23 NOTE — L&D DELIVERY NOTE
Vaginal Delivery Summary - OB/GYN   Lizette Vieira 30 y o  female MRN: [id number]  Unit/Bed#: L&D 326-01 Encounter: [id number]          Predelivery Diagnosis:  1  Pregnancy at 39w2d  2  HSV, on Valtrex   3  GBS negative    Postdelivery Diagnosis:  1  Same as above  2  Delivery of term     Procedure: Spontaneous Vaginal Delivery, repair of 1st degree laceration    Attending: Caro    Assistant: Matt    Anesthesia: Epidural    QBL: 246cc  Admission H 4  Admission platelets: 382    Complications: none apparent    Specimens: cord blood, arterial and venous cord blood gasses, placenta to storage    Findings:   1  Viable female at 1252, with APGARS of 8 and 9 at 1 and 5 minutes respectively,  2  Spontaneous delivery of intact placenta at 1258  3  1 degree laceration repaired with 3-0 Vicryl  4  Blood gases:    Umbilical Cord Venous Blood Gas:  Results from last 7 days   Lab Units 19  1256   PH COV  7 252   PCO2 COV mm HG 52 1*   HCO3 COV mmol/L 22 4   BASE EXC COV mmol/L -5 5*   O2 CT CD VB mL/dL 13 6   O2 HGB, VENOUS CORD % 85 9     Umbilical Cord Arterial Blood Gas:        Disposition:  Patient tolerated the procedure well and was recovering in labor and delivery room     Brief history and labor course:  Ms Lizette Vieira is a 30 y o  [id number] at 36wk3d  She presented to labor and delivery for elective induction of labor  Her history is notable for HSV, on Valtrex suppression  She was having q15 minute contractions for the past 24 hours on presentation, and was found to be 3/80/-2  She did not require antibiotics during labor  She was started on pitocin, and had SROM 75 minutes after pitocin initiation  She received an epidural, and thereafter was noted to have a Category II tracing, with recurrent variable decelerations  She was found to be making rapid change in this time, and soon progressed to fully dilated at 1240      Description of procedure    Warm compresses were applied during pushing and perineal massage was performed  After pushing for 12 minutes, at 1252 patient delivered a viable female , wt pending, apgars of 8 (1 min) and 9 (5 min)  The fetal vertex delivered spontaneously  Baby was checked for nuchal  None present  The anterior shoulder delivered atraumatically with maternal expulsive forces and the assistance of gentle downward traction  The posterior shoulder delivered with maternal expulsive forces and the assistance of gentle upward traction  The remainder of the fetus delivered spontaneously  Upon delivery, the infant was placed on the mothers abdomen and the cord was clamped and cut  Delayed cord clamping was performed  The infant was noted to cry spontaneously and had all signs of life  There was no evidence for injury  Awaiting nurse resuscitators evaluated the   Arterial and venous cord blood gases and cord blood was collected for analysis  These were promptly sent to the lab  In the immediate post-partum, 30 units of IV pitocin was administered, and the uterus was noted to contract down well with massage and pitocin  The placenta delivered spontaneously at 1258 and was noted to have a centrally inserted 3 vessel cord  The vagina, cervix, perineum, and rectum were inspected and there was noted to be a small first degree laceration  This was repaired with 3-0 Vicryl, and noted to be hemostatic after repair  At the conclusion of the procedure, all needle, sponge, and instrument counts were noted to be correct  Duy Eric showed no retained sponges  Patient tolerated the procedure well and was allowed to recover in labor and delivery room with family and  before being transferred to the post-partum floor  Dr Filippo Carlos was present and participated in all key portions of the case          Andres Rizvi MD  2019  1:18 PM

## 2019-08-23 NOTE — OB LABOR/OXYTOCIN SAFETY PROGRESS
Oxytocin Safety Progress Check Note - Wanda Orozco 30 y o  female MRN: [id number]    Unit/Bed#: L&D 326-01 Encounter: [id number]    OB History    Para Term  AB Living   3 1 1 0 1 1   SAB TAB Ectopic Multiple Live Births   0 0 0 0 1     Gestational Age: 39w2d  Dose (marlene-units/min) Oxytocin: 4 marlene-units/min  Contraction Frequency (minutes): 2  Contraction Quality: Moderate  Tachysystole: No   Dilation: 4        Effacement (%): 80  Station: -2  Baseline Rate: 145 bpm  Fetal Heart Rate: 145 BPM  FHR Category: Category I          Notes/comments:   Evaluated patient after report that had gush of fluid  Pitocin started at 1000AM  Now lisa q2 minutes  Pooling noted on exam, nitrazine positive  Now changed to 4cm dilated  Patient visibly uncomfortable during contractions  Would be good candidate for epidural  Will continue to monitor and re-evaluate per protocol      Discussed with Dr Trini Ulloa MD 2019 11:23 AM

## 2019-08-23 NOTE — DISCHARGE SUMMARY
Discharge Summary - Lynsey Smith 30 y o  female MRN: [id number]    Unit/Bed#: L&D 326-01 Encounter: [id number]    Admission Date: 2019     Discharge Date: 2019    Admitting Diagnosis:   Patient Active Problem List   Diagnosis    39 weeks gestation of pregnancy    Uterine contractions during pregnancy    Herpes simplex virus type 2 (HSV-2) infection affecting 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     (spontaneous vaginal delivery)       Discharge Diagnosis:   Same, delivered    Procedures:   spontaneous vaginal delivery    Admitting Attending: Dr Becky Huntley DO  Delivery Attending: Dr Becky Huntley DO  Discharge Attending: Dr Becky Huntley, [id number] [address] Course: Lynsey Smith is a 30 y o  [id number] who was admitted for elective induction of labor  She received pitocin for induction, had SROM, and progressed to fully dilated at 1240 on 19  She then underwent an uncomplicated spontaneous vaginal delivery with repair of first degree laceration and delivered a viable female  at 36  APGARS were 8, 9 at 1 and 5 minutes, respectively   weighed 6lb 15oz   was then transferred to  nursery  Patient tolerated the procedure well and was transferred to postpartum in stable condition  The patient's post partum course was unremarkable  On day of discharge, she was ambulating and able to reasonably perform all ADLs  She was voiding and had appropriate bowel function  Pain was well controlled  She was discharged home on postpartum day #1 without complications  Patient was instructed to follow up with her OB as an outpatient and was given appropriate warnings to call provider if she develops signs of infection or uncontrolled pain  Condition at discharge:   good     Disposition:   Home    Planned Readmission:   No    Discharge Medications:   Please see after visit summary for full list of discharge medications      Discharge instructions :   -Do not place anything (no partner, tampons or douche) in your vagina for 6 weeks  -You may walk for exercise for the first 6 weeks then gradually return to your usual activities    -Please do not drive for 1 week if you have no stitches and for 2 weeks if you have stitches or underwent a  delivery     -You may take baths or shower per your preference    -Please look at your bust (breasts) in the mirror daily and call provider for redness or tenderness or increased warmth  - If you have had a  please look at your incision daily as well and call provider for increasing redness or steady drainage from the incision    -Please call your provider if temperature > 100 4*F or 38* C, worsening pain or a foul discharge      Amira Wilson, DO

## 2019-08-23 NOTE — ANESTHESIA PROCEDURE NOTES
Epidural Block    Patient location during procedure: OB  Start time: 8/23/2019 11:50 AM  Staffing  Anesthesiologist: Deonte Hernandez DO  Performed: anesthesiologist   Preanesthetic Checklist  Completed: patient identified, site marked, surgical consent, pre-op evaluation, timeout performed, IV checked, risks and benefits discussed and monitors and equipment checked  Epidural  Patient position: sitting  Prep: Betadine  Patient monitoring: heart rate and frequent blood pressure checks  Approach: midline  Location: lumbar (1-5)  Injection technique: YASIR air  Needle  Needle type: Tuohy   Needle gauge: 18 G  Catheter type: side hole  Catheter size: 20 G  Test dose: negativeropivacaine (NAROPIN) 0 2% epidural injection, 10 mL  Assessment  Sensory level: [id number] aspiration for CSF, negative aspiration for heme and no paresthesia on injection  patient tolerated the procedure well with no immediate complications

## 2019-08-24 VITALS
SYSTOLIC BLOOD PRESSURE: 92 MMHG | TEMPERATURE: 98.8 F | HEIGHT: 63 IN | WEIGHT: 154 LBS | RESPIRATION RATE: 18 BRPM | BODY MASS INDEX: 27.29 KG/M2 | OXYGEN SATURATION: 98 % | HEART RATE: 94 BPM | DIASTOLIC BLOOD PRESSURE: 70 MMHG

## 2019-08-24 RX ORDER — DIAPER,BRIEF,INFANT-TODD,DISP
1 EACH MISCELLANEOUS 4 TIMES DAILY PRN
Qty: 30 G | Refills: 0 | Status: SHIPPED | OUTPATIENT
Start: 2019-08-24 | End: 2019-10-31 | Stop reason: HOSPADM

## 2019-08-24 RX ORDER — IBUPROFEN 600 MG/1
600 TABLET ORAL EVERY 6 HOURS PRN
Qty: 30 TABLET | Refills: 0 | Status: SHIPPED | OUTPATIENT
Start: 2019-08-24 | End: 2019-10-31 | Stop reason: HOSPADM

## 2019-08-24 RX ORDER — DOCUSATE SODIUM 100 MG/1
100 CAPSULE, LIQUID FILLED ORAL 2 TIMES DAILY
Qty: 30 CAPSULE | Refills: 0 | Status: SHIPPED | OUTPATIENT
Start: 2019-08-24 | End: 2019-10-31 | Stop reason: HOSPADM

## 2019-08-24 RX ADMIN — IBUPROFEN 600 MG: 600 TABLET, FILM COATED ORAL at 03:17

## 2019-08-24 RX ADMIN — ACETAMINOPHEN 650 MG: 325 TABLET ORAL at 03:18

## 2019-08-24 NOTE — PLAN OF CARE
Problem: PAIN - ADULT  Goal: Verbalizes/displays adequate comfort level or baseline comfort level  Description  Interventions:  - Encourage patient to monitor pain and request assistance  - Assess pain using appropriate pain scale  - Administer analgesics based on type and severity of pain and evaluate response  - Implement non-pharmacological measures as appropriate and evaluate response  - Consider cultural and social influences on pain and pain management  - Notify physician/advanced practitioner if interventions unsuccessful or patient reports new pain  Outcome: Progressing     Problem: INFECTION - ADULT  Goal: Absence or prevention of progression during hospitalization  Description  INTERVENTIONS:  - Assess and monitor for signs and symptoms of infection  - Monitor lab/diagnostic results  - Monitor all insertion sites, i e  indwelling lines, tubes, and drains  - Monitor endotracheal if appropriate and nasal secretions for changes in amount and color  - [place] appropriate cooling/warming therapies per order  - Administer medications as ordered  - Instruct and encourage patient and family to use good hand hygiene technique  - Identify and instruct in appropriate isolation precautions for identified infection/condition  Outcome: Progressing  Goal: Absence of fever/infection during neutropenic period  Description  INTERVENTIONS:  - Monitor WBC    Outcome: Progressing     Problem: SAFETY ADULT  Goal: Patient will remain free of falls  Description  INTERVENTIONS:  - Assess patient frequently for physical needs  -  Identify cognitive and physical deficits and behaviors that affect risk of falls    -  [place] fall precautions as indicated by assessment   - Educate patient/family on patient safety including physical limitations  - Instruct patient to call for assistance with activity based on assessment  - Modify environment to reduce risk of injury  - Consider OT/PT consult to assist with strengthening/mobility  Outcome: Progressing  Goal: Maintain or return to baseline ADL function  Description  INTERVENTIONS:  -  Assess patient's ability to carry out ADLs; assess patient's baseline for ADL function and identify physical deficits which impact ability to perform ADLs (bathing, care of mouth/teeth, toileting, grooming, dressing, etc )  - Assess/evaluate cause of self-care deficits   - Assess range of motion  - Assess patient's mobility; develop plan if impaired  - Assess patient's need for assistive devices and provide as appropriate  - Encourage maximum independence but intervene and supervise when necessary  - Involve family in performance of ADLs  - Assess for home care needs following discharge   - Consider OT consult to assist with ADL evaluation and planning for discharge  - Provide patient education as appropriate  Outcome: Progressing  Goal: Maintain or return mobility status to optimal level  Description  INTERVENTIONS:  - Assess patient's baseline mobility status (ambulation, transfers, stairs, etc )    - Identify cognitive and physical deficits and behaviors that affect mobility  - Identify mobility aids required to assist with transfers and/or ambulation (gait belt, sit-to-stand, lift, walker, cane, etc )  - [place] fall precautions as indicated by assessment  - Record patient progress and toleration of activity level on Mobility SBAR; progress patient to next Phase/Stage  - Instruct patient to call for assistance with activity based on assessment  - Consider rehabilitation consult to assist with strengthening/weightbearing, etc   Outcome: Progressing     Problem: DISCHARGE PLANNING  Goal: Discharge to home or other facility with appropriate resources  Description  INTERVENTIONS:  - Identify barriers to discharge w/patient and caregiver  - Arrange for needed discharge resources and transportation as appropriate  - Identify discharge learning needs (meds, wound care, etc )  - Arrange for interpretive services to assist at discharge as needed  - Refer to Case Management Department for coordinating discharge planning if the patient needs post-hospital services based on physician/advanced practitioner order or complex needs related to functional status, cognitive ability, or social support system  Outcome: Progressing     Problem: POSTPARTUM  Goal: Experiences normal postpartum course  Description  INTERVENTIONS:  - Monitor maternal vital signs  - Assess uterine involution and lochia  Outcome: Progressing  Goal: Appropriate maternal -  bonding  Description  INTERVENTIONS:  - Identify family support  - Assess for appropriate maternal/infant bonding   -Encourage maternal/infant bonding opportunities  - Referral to  or  as needed  Outcome: Progressing  Goal: Establishment of infant feeding pattern  Description  INTERVENTIONS:  - Assess breast/bottle feeding  - Refer to lactation as needed  Outcome: Progressing  Goal: Incision(s), wounds(s) or drain site(s) healing without S/S of infection  Description  INTERVENTIONS  - Assess and document risk factors for skin impairment   - Assess and document dressing, incision, wound bed, drain sites and surrounding tissue  - Consider nutrition services referral as needed  - Oral mucous membranes remain intact  - Provide patient/ family education  Outcome: Progressing

## 2019-08-24 NOTE — LACTATION NOTE
This note was copied from a baby's chart  Met with mother to go over feeding log since birth for the first week  Emphasized 8 or more (12) feedings in a 24 hour period, what to expect for the number of diapers per day of life and the progression of properties of the  stooling pattern  Discussed s/s that breastfeeding is going well after day 4 and when to get help from a pediatrician or lactation support person after day 4  Booklet included Breast Pumping Instructions, When You Go Back to Work or School, and Breastfeeding Resources for after discharge including access to the number for the [employer]  Mom had supplemented with good start formula through the night  Discussed risks for early supplementation: over feeding, longer digestion times, engorgement for mom, lower milk supply for mom, and nipple confusion  Benefits of breast feeding for infant's intestinal tract, less engorgement for mom, protection from multiple disease processes as infant develops, avoidance of over feeding for infant, less nipple confusion, and increased health benefits for mom  Mom stated baby spit up all the formula and is going to try to exclusively breast feed  Mom states she gave the bottle because the baby kept wanting to eat  Discussed 2nd night syndrome and ways to calm infant  Hand out given  [employer] [facility]  to call for assistance, questions and concerns  Extension number for inpatient lactation support provided

## 2019-08-26 ENCOUNTER — TRANSITIONAL CARE MANAGEMENT (OUTPATIENT)
Dept: FAMILY MEDICINE CLINIC | Facility: CLINIC | Age: 24
End: 2019-08-26

## 2019-08-28 PROBLEM — D17.1 LIPOMA OF CHEST WALL: Status: ACTIVE | Noted: 2019-08-28

## 2019-08-31 LAB — PLACENTA IN STORAGE: NORMAL

## 2019-10-31 ENCOUNTER — OFFICE VISIT (OUTPATIENT)
Dept: URGENT CARE | Facility: MEDICAL CENTER | Age: 24
End: 2019-10-31
Payer: COMMERCIAL

## 2019-10-31 VITALS
TEMPERATURE: 97.7 F | RESPIRATION RATE: 16 BRPM | OXYGEN SATURATION: 99 % | HEART RATE: 87 BPM | DIASTOLIC BLOOD PRESSURE: 64 MMHG | HEIGHT: 63 IN | SYSTOLIC BLOOD PRESSURE: 115 MMHG | BODY MASS INDEX: 23.04 KG/M2 | WEIGHT: 130 LBS

## 2019-10-31 DIAGNOSIS — H10.9 CONJUNCTIVITIS OF LEFT EYE, UNSPECIFIED CONJUNCTIVITIS TYPE: Primary | ICD-10-CM

## 2019-10-31 PROCEDURE — 99213 OFFICE O/P EST LOW 20 MIN: CPT | Performed by: FAMILY MEDICINE

## 2019-10-31 PROCEDURE — S9088 SERVICES PROVIDED IN URGENT: HCPCS | Performed by: FAMILY MEDICINE

## 2019-10-31 RX ORDER — TOBRAMYCIN 3 MG/ML
1 SOLUTION/ DROPS OPHTHALMIC
Qty: 1.3 ML | Refills: 0 | Status: SHIPPED | OUTPATIENT
Start: 2019-10-31 | End: 2019-11-04

## 2019-10-31 NOTE — PATIENT INSTRUCTIONS
I prescribed tobramycin eyedrops 1 drop into left eye every 4 hours while awake for 5 days  Advised patient apply cool compress to prevent swelling and per itching and burning  Follow up with primary care provider redness or swelling worsens  Conjunctivitis   WHAT YOU SHOULD KNOW:   Conjunctivitis, or pink eye, is inflammation of your conjunctiva  The conjunctiva is a thin tissue that covers the front of your eye and the back of your eyelids  The conjunctiva helps protect your eye and keep it moist         INSTRUCTIONS:   Medicines:   · Allergy medicine: This medicine helps decrease itchy, red, swollen eyes caused by allergies  It may be given as a pill, eye drops, or nasal spray  · Antibiotics:  You will need antibiotics if your conjunctivitis is caused by bacteria  This medicine may be given as eye drops or eye ointment  · Steroid medicine: This medicine helps decrease inflammation  It may be given as a pill, eye drops, or nasal spray  · Take your medicine as directed  Call your healthcare provider if you think your medicine is not helping or if you have side effects  Tell him if you are allergic to any medicine  Keep a list of the medicines, vitamins, and herbs you take  Include the amounts, and when and why you take them  Bring the list or the pill bottles to follow-up visits  Carry your medicine list with you in case of an emergency  Follow up with your primary healthcare provider as directed: You may need to return for more tests on your eyes  These will help your primary healthcare provider check for eye damage  Write down your questions so you remember to ask them during your visits  Avoid the spread of conjunctivitis:   · Wash your hands often:  Wash your hands before you touch your eyes  Also wash your hands before you prepare or eat food and after you use the bathroom or change a diaper      · Avoid allergens:  Try to avoid the things that cause your allergies, such as pets, dust, or grass      · Avoid contact:  Do not share towels or washcloths  Try to stay away from others as much as possible  Ask when you can return to work or school  · Throw away eye makeup:  Throw away mascara and other eye makeup  Manage your symptoms:  · Apply a cool compress:  Wet a washcloth with cold water and place it on your eye  This will help decrease swelling  · Use eye drops:  Eye drops, or artificial tears, can be bought without a doctor's order  They help keep your eye moist     · Do not wear contact lenses: They can irritate your eye  Throw away the pair you are using and ask when you can wear them again  Use a new pair of lenses when your primary healthcare provider says it is okay  · Flush your eye:  You may need to flush your eye with saline to help decrease your symptoms  Ask for more information on how to flush your eye  Contact your primary healthcare provider if:   · Your eyesight becomes blurry  · You have tiny bumps or spots of blood on your eye  · You have questions or concerns about your condition or care  Return to the emergency department if:   · The swelling in your eye gets worse, even after treatment  · Your vision suddenly becomes worse or you cannot see at all  · Your eye begins to bleed  © 2014 [address] is for End User's use only and may not be sold, redistributed or otherwise used for commercial purposes  All illustrations and images included in CareNotes® are the copyrighted property of A D A M , Inc  or Mike Fajardo  The above information is an  only  It is not intended as medical advice for individual conditions or treatments  Talk to your doctor, nurse or pharmacist before following any medical regimen to see if it is safe and effective for you

## 2019-10-31 NOTE — PROGRESS NOTES
[facility]        NAME: Gilford Force is a 30 y o  female  : 1995    MRN: [id number]  DATE: 2019  TIME: 6:08 PM    Assessment and Plan   Conjunctivitis of left eye, unspecified conjunctivitis type [H10 9]  1  Conjunctivitis of left eye, unspecified conjunctivitis type  tobramycin (TOBREX) 0 3 % SOLN         Patient Instructions       Follow up with PCP in 3-5 days  Proceed to  ER if symptoms worsen  Chief Complaint     Chief Complaint   Patient presents with    Eye Pain     Patient relates started with left eye redness and crusty white/yellowish drainage since Tuesday  Denies injury to eye  C/O itching and pain  Using OCT eye drops  States "used essential oil on my face not sure if it was too close to my eye"         History of Present Illness       61-year-old female here today with 2 day history of left thigh irritation  She has noticed redness, increased tearing  Yesterday however she woke up with patient discharge  She has been applying over-the-counter eye drops with no noticeable improvement  She believes she got the infection from her 3year-old son who currently attend   Review of Systems   Review of Systems   Eyes: Positive for discharge and redness  Current Medications       Current Outpatient Medications:     Probiotic Product (PROBIOTIC-10 PO), Probiotic, Disp: , Rfl:     tobramycin (TOBREX) 0 3 % SOLN, Administer 1 drop into the left eye every 4 (four) hours while awake for 5 days, Disp: 1 3 mL, Rfl: 0    Current Allergies     Allergies as of 10/31/2019 - Reviewed 10/31/2019   Allergen Reaction Noted    Other Allergic Rhinitis 10/23/2017            The following portions of the patient's history were reviewed and updated as appropriate: allergies, current medications, past family history, past medical history, past social history, past surgical history and problem list      History reviewed  No pertinent past medical history      Past Surgical History:   Procedure Laterality Date    NO PAST SURGERIES         Family History   Problem Relation Age of Onset    Aneurysm Father         cardiac    Heart disease Father         problem    Leukemia Brother         chronic lymphoid    Aneurysm Paternal Grandfather         cardiac     Throat cancer Family     Alcohol abuse Neg Hx     Substance Abuse Neg Hx     Mental illness Neg Hx          Medications have been verified  Objective   /64   Pulse 87   Temp 97 7 °F (36 5 °C) (Tympanic)   Resp 16   Ht 5' 3" (1 6 m)   Wt 59 kg (130 lb)   LMP 10/04/2019   SpO2 99%   BMI 23 03 kg/m²        Physical Exam     Physical Exam   Eyes: Pupils are equal, round, and reactive to light  EOM are normal  Left eye exhibits discharge  Left eye reveals injected sclera conjunctiva  Minimal purulent discharge observed

## 2019-11-04 ENCOUNTER — OFFICE VISIT (OUTPATIENT)
Dept: FAMILY MEDICINE CLINIC | Facility: CLINIC | Age: 24
End: 2019-11-04
Payer: COMMERCIAL

## 2019-11-04 VITALS
BODY MASS INDEX: 25.3 KG/M2 | DIASTOLIC BLOOD PRESSURE: 78 MMHG | SYSTOLIC BLOOD PRESSURE: 118 MMHG | TEMPERATURE: 98.3 F | RESPIRATION RATE: 14 BRPM | WEIGHT: 134 LBS | HEART RATE: 78 BPM | HEIGHT: 61 IN

## 2019-11-04 DIAGNOSIS — H10.33 ACUTE BACTERIAL CONJUNCTIVITIS OF BOTH EYES: ICD-10-CM

## 2019-11-04 DIAGNOSIS — J02.9 SORE THROAT: Primary | ICD-10-CM

## 2019-11-04 LAB — S PYO AG THROAT QL: NEGATIVE

## 2019-11-04 PROCEDURE — 3008F BODY MASS INDEX DOCD: CPT | Performed by: NURSE PRACTITIONER

## 2019-11-04 PROCEDURE — 87880 STREP A ASSAY W/OPTIC: CPT | Performed by: NURSE PRACTITIONER

## 2019-11-04 PROCEDURE — 99213 OFFICE O/P EST LOW 20 MIN: CPT | Performed by: NURSE PRACTITIONER

## 2019-11-04 RX ORDER — PREDNISONE 20 MG/1
20 TABLET ORAL 2 TIMES DAILY WITH MEALS
Qty: 6 TABLET | Refills: 0 | Status: SHIPPED | OUTPATIENT
Start: 2019-11-04 | End: 2019-12-19 | Stop reason: ALTCHOICE

## 2019-11-04 RX ORDER — POLYMYXIN B SULFATE AND TRIMETHOPRIM 1; 10000 MG/ML; [USP'U]/ML
2 SOLUTION OPHTHALMIC EVERY 4 HOURS
Qty: 10 ML | Refills: 0 | Status: SHIPPED | OUTPATIENT
Start: 2019-11-04 | End: 2019-12-19 | Stop reason: ALTCHOICE

## 2019-11-04 NOTE — PROGRESS NOTES
Assessment/Plan:        1  Acute bacterial conjunctivitis of both eyes  Please use good hygiene with good hand washing, change pillow cases and towels  Call if this is not getting better  - polymyxin b-trimethoprim (POLYTRIM) ophthalmic solution; Administer 2 drops to both eyes every 4 (four) hours  Dispense: 10 mL; Refill: 0    rto rpn       Subjective:      Patient ID: Jim Barrow is a 30 y o  female  Here today with congestion and eye irritation  Was seen at [facility] last week with itchy and irritated L eye  was given an eye drop   Then started to get the same thing in the R eye  The L eye did improve but not completely   Then started with a sore throat 2 days ago  [place] Has some ears feeling clogged  Has some sinus pressure and congestion  No fevers no chest congestion  Is taking claritin only   Sore throat is now resolved  OBJECTIVE  History reviewed  No pertinent past medical history  temporarily  Wt Readings from Last 3 Encounters:   11/04/19 60 8 kg (134 lb)   10/31/19 59 kg (130 lb)   08/23/19 69 9 kg (154 lb)     BP Readings from Last 3 Encounters:   11/04/19 118/78   10/31/19 115/64   08/24/19 92/70     Pulse Readings from Last 3 Encounters:   11/04/19 78   10/31/19 87   08/24/19 94     BMI Readings from Last 3 Encounters:   11/04/19 25 32 kg/m²   10/31/19 23 03 kg/m²   08/23/19 27 28 kg/m²        Physical Exam   Constitutional: She appears well-developed and well-nourished  HENT:   Tm's clear  turbs open with serous discharge  Pharynx benign     Eyes:   L eye with bulbar and palpebral erythema and scant crusting discharge  R eye with similar but less  Neck: Normal range of motion  Cardiovascular: Normal rate and regular rhythm     Pulmonary/Chest: Effort normal and breath sounds normal

## 2019-11-17 ENCOUNTER — OFFICE VISIT (OUTPATIENT)
Dept: URGENT CARE | Facility: MEDICAL CENTER | Age: 24
End: 2019-11-17
Payer: COMMERCIAL

## 2019-11-17 VITALS
TEMPERATURE: 99.2 F | OXYGEN SATURATION: 100 % | HEIGHT: 61 IN | WEIGHT: 132 LBS | SYSTOLIC BLOOD PRESSURE: 121 MMHG | DIASTOLIC BLOOD PRESSURE: 72 MMHG | HEART RATE: 83 BPM | RESPIRATION RATE: 20 BRPM | BODY MASS INDEX: 24.92 KG/M2

## 2019-11-17 DIAGNOSIS — H66.002 ACUTE SUPPURATIVE OTITIS MEDIA OF LEFT EAR WITHOUT SPONTANEOUS RUPTURE OF TYMPANIC MEMBRANE, RECURRENCE NOT SPECIFIED: Primary | ICD-10-CM

## 2019-11-17 PROCEDURE — S9088 SERVICES PROVIDED IN URGENT: HCPCS | Performed by: PHYSICIAN ASSISTANT

## 2019-11-17 PROCEDURE — 99214 OFFICE O/P EST MOD 30 MIN: CPT | Performed by: PHYSICIAN ASSISTANT

## 2019-11-17 RX ORDER — AMOXICILLIN 500 MG/1
500 CAPSULE ORAL EVERY 8 HOURS SCHEDULED
Qty: 30 CAPSULE | Refills: 0 | Status: SHIPPED | OUTPATIENT
Start: 2019-11-17 | End: 2019-11-27

## 2019-11-17 NOTE — PATIENT INSTRUCTIONS
Otitis Media   AMBULATORY CARE:   Otitis media  is an ear infection  Common symptoms include the following:   · Fever or a headache    · Ear pain    · Trouble hearing    · Ear feels plugged or full or you have ringing or buzzing in your ear    · Dizziness or you lose your balance    · Nausea or vomiting  Seek immediate care for the following symptoms:   · Seizure    · Fever and a stiff neck  Treatment for otitis media  may include any of the following:  · NSAIDs , such as ibuprofen, help decrease swelling, pain, and fever  This medicine is available with or without a doctor's order  NSAIDs can cause stomach bleeding or kidney problems in certain people  If you take blood thinner medicine, always ask your healthcare provider if NSAIDs are safe for you  Always read the medicine label and follow directions  · Ear drops  to help treat your ear pain  · Antibiotics  to help kill the germs that caused your ear infection  Care for otitis media:   · Use heat  Place a warm, moist washcloth on your ear to decrease pain  Apply for 15 to 20 minutes, 3 to 4 times a day    · Use ice  Ice helps decrease swelling and pain  Use an ice pack or put crushed ice in a plastic bag  Cover the ice pack with a towel and place it on your ear for 15 to 20 minutes, 3 to 4 times a day for 2 days  Prevent otitis media:   · Wash your hands often  This will help prevent the spread of germs  Encourage everyone in your house to wash their hands with soap and water after they use the bathroom  Everyone should also wash their hands after they change a child's diaper and before they prepare or eat food  · Stay away from people who are ill  Germs are easily and quickly spread through contact  Follow up with your healthcare provider as directed:  Write down your questions so you remember to ask them during your visits     © 2017 [employer] Information is for End User's use only and may not be sold, redistributed or otherwise used for commercial purposes  All illustrations and images included in CareNotes® are the copyrighted property of A D [employer] M , Inc  or Mike Fajardo  The above information is an  only  It is not intended as medical advice for individual conditions or treatments  Talk to your doctor, nurse or pharmacist before following any medical regimen to see if it is safe and effective for you

## 2019-11-21 NOTE — PROGRESS NOTES
[facility]        NAME: Jeet De La Cruz is a 30 y o  female  : 1995    MRN: [id number]      Assessment and Plan   Acute suppurative otitis media of left ear without spontaneous rupture of tympanic membrane, recurrence not specified [[id number] [id number]]  1  Acute suppurative otitis media of left ear without spontaneous rupture of tympanic membrane, recurrence not specified  amoxicillin (AMOXIL) 500 mg capsule         Patient Instructions       Follow up with PCP in 3-5 days  Proceed to  ER if symptoms worsen  Chief Complaint     Chief Complaint   Patient presents with    Cold Like Symptoms     few days         History of Present Illness       URI    This is a new problem  The current episode started in the past 7 days  There has been no fever  Associated symptoms include congestion, coughing, ear pain, a plugged ear sensation, rhinorrhea, a sore throat and swollen glands  Pertinent negatives include no abdominal pain, chest pain, headaches, nausea, rash, sinus pain or vomiting  Treatments tried: steriod taper from pcp  Review of Systems   Review of Systems   Constitutional: Negative for chills, fatigue and fever  HENT: Positive for congestion, ear pain, rhinorrhea and sore throat  Negative for hearing loss, postnasal drip, sinus pressure and sinus pain  Eyes: Negative for pain and discharge  Respiratory: Positive for cough  Negative for chest tightness and shortness of breath  Cardiovascular: Negative for chest pain  Gastrointestinal: Negative for abdominal pain, constipation, nausea and vomiting  Genitourinary: Negative for difficulty urinating  Musculoskeletal: Negative for arthralgias and myalgias  Skin: Negative for rash  Neurological: Negative for dizziness and headaches  Psychiatric/Behavioral: Negative for behavioral problems           Current Medications       Current Outpatient Medications:     amoxicillin (AMOXIL) 500 mg capsule, Take 1 capsule (500 mg total) by mouth every 8 (eight) hours for 10 days, Disp: 30 capsule, Rfl: 0    Levonorgestrel (LILETTA, 52 MG,) 19 5 MCG/DAY IUD, Liletta 19 5 mcg/24 hrs (5 yrs) 52 mg intrauterine device  Take 1 device by intrauterine route , Disp: , Rfl:     polymyxin b-trimethoprim (POLYTRIM) ophthalmic solution, Administer 2 drops to both eyes every 4 (four) hours, Disp: 10 mL, Rfl: 0    predniSONE 20 mg tablet, Take 1 tablet (20 mg total) by mouth 2 (two) times a day with meals, Disp: 6 tablet, Rfl: 0    Probiotic Product (PROBIOTIC-10 PO), Probiotic, Disp: , Rfl:     Current Allergies     Allergies as of 11/17/2019 - Reviewed 11/17/2019   Allergen Reaction Noted    Other Allergic Rhinitis 10/23/2017            The following portions of the patient's history were reviewed and updated as appropriate: allergies, current medications, past family history, past medical history, past social history, past surgical history and problem list      History reviewed  No pertinent past medical history  Past Surgical History:   Procedure Laterality Date    NO PAST SURGERIES         Family History   Problem Relation Age of Onset    Aneurysm Father         cardiac    Heart disease Father         problem    Leukemia Brother         chronic lymphoid    Aneurysm Paternal Grandfather         cardiac     Throat cancer Family     Alcohol abuse Neg Hx     Substance Abuse Neg Hx     Mental illness Neg Hx          Medications have been verified  Objective   /72   Pulse 83   Temp 99 2 °F (37 3 °C)   Resp 20   Ht 5' 1" (1 549 m)   Wt 59 9 kg (132 lb)   SpO2 100%   BMI 24 94 kg/m²        Physical Exam     Physical Exam   Constitutional: She is oriented to person, place, and time  She appears well-developed and well-nourished  HENT:   Right Ear: Tympanic membrane and external ear normal    Left Ear: External ear normal  Tympanic membrane is injected, erythematous and bulging  Nose: Rhinorrhea present     Neck: Normal range of motion  No edema present  Cardiovascular: Normal rate, regular rhythm, S1 normal, S2 normal and normal heart sounds  No murmur heard  Pulmonary/Chest: Effort normal and breath sounds normal  No respiratory distress  She has no wheezes  She has no rales  She exhibits no tenderness  Lymphadenopathy:     She has no cervical adenopathy  Neurological: She is alert and oriented to person, place, and time  Skin: Skin is warm, dry and intact  No rash noted  Psychiatric: She has a normal mood and affect  Her speech is normal and behavior is normal    Nursing note and vitals reviewed

## 2019-12-14 ENCOUNTER — OFFICE VISIT (OUTPATIENT)
Dept: URGENT CARE | Facility: MEDICAL CENTER | Age: 24
End: 2019-12-14
Payer: COMMERCIAL

## 2019-12-14 VITALS
HEIGHT: 61 IN | OXYGEN SATURATION: 100 % | WEIGHT: 130 LBS | BODY MASS INDEX: 24.55 KG/M2 | SYSTOLIC BLOOD PRESSURE: 118 MMHG | RESPIRATION RATE: 18 BRPM | DIASTOLIC BLOOD PRESSURE: 66 MMHG | HEART RATE: 97 BPM | TEMPERATURE: 97.2 F

## 2019-12-14 DIAGNOSIS — J06.9 VIRAL UPPER RESPIRATORY TRACT INFECTION: Primary | ICD-10-CM

## 2019-12-14 PROCEDURE — 99213 OFFICE O/P EST LOW 20 MIN: CPT | Performed by: PHYSICIAN ASSISTANT

## 2019-12-14 PROCEDURE — S9088 SERVICES PROVIDED IN URGENT: HCPCS | Performed by: PHYSICIAN ASSISTANT

## 2019-12-14 RX ORDER — BROMPHENIRAMINE MALEATE, PSEUDOEPHEDRINE HYDROCHLORIDE, AND DEXTROMETHORPHAN HYDROBROMIDE 2; 30; 10 MG/5ML; MG/5ML; MG/5ML
10 SYRUP ORAL 3 TIMES DAILY PRN
Qty: 120 ML | Refills: 0 | Status: SHIPPED | OUTPATIENT
Start: 2019-12-14 | End: 2020-04-06 | Stop reason: ALTCHOICE

## 2019-12-14 NOTE — PROGRESS NOTES
[facility]        NAME: Vicenta Ovalles is a 30 y o  female  : 1995    MRN: [id number]  DATE: 2019  TIME: 6:12 PM    Assessment and Plan   Viral upper respiratory tract infection [J06 9]  1  Viral upper respiratory tract infection  brompheniramine-pseudoephedrine-DM 30-2-10 MG/5ML syrup         Patient Instructions       Follow up with PCP in 3-5 days  Increase fluids    Chief Complaint     Chief Complaint   Patient presents with    Cold Like Symptoms     Patient c/o nasal congestion and facial pressure x 1 day          History of Present Illness       URI    This is a new problem  The current episode started yesterday  The problem has been unchanged  There has been no fever  Associated symptoms include congestion, coughing, rhinorrhea and sneezing  Pertinent negatives include no abdominal pain, chest pain, diarrhea, dysuria, ear pain, headaches, joint pain, joint swelling, nausea, neck pain, plugged ear sensation, rash, sinus pain, sore throat, swollen glands, vomiting or wheezing  She has tried nothing for the symptoms  Review of Systems   Review of Systems   HENT: Positive for congestion, rhinorrhea and sneezing  Negative for ear pain, sinus pain and sore throat  Respiratory: Positive for cough  Negative for wheezing  Cardiovascular: Negative for chest pain  Gastrointestinal: Negative for abdominal pain, diarrhea, nausea and vomiting  Genitourinary: Negative for dysuria  Musculoskeletal: Negative for joint pain and neck pain  Skin: Negative for rash  Neurological: Negative for headaches  All other systems reviewed and are negative          Current Medications       Current Outpatient Medications:     brompheniramine-pseudoephedrine-DM 30-2-10 MG/5ML syrup, Take 10 mL by mouth 3 (three) times a day as needed for congestion or cough, Disp: 120 mL, Rfl: 0    Levonorgestrel (LILETTA, 52 MG,) 19 5 MCG/DAY IUD, Liletta 19 5 mcg/24 hrs (5 yrs) 52 mg intrauterine device  Take 1 device by intrauterine route , Disp: , Rfl:     polymyxin b-trimethoprim (POLYTRIM) ophthalmic solution, Administer 2 drops to both eyes every 4 (four) hours (Patient not taking: Reported on 12/14/2019), Disp: 10 mL, Rfl: 0    predniSONE 20 mg tablet, Take 1 tablet (20 mg total) by mouth 2 (two) times a day with meals (Patient not taking: Reported on 12/14/2019), Disp: 6 tablet, Rfl: 0    Probiotic Product (PROBIOTIC-10 PO), Probiotic, Disp: , Rfl:     Current Allergies     Allergies as of 12/14/2019 - Reviewed 12/14/2019   Allergen Reaction Noted    Other Allergic Rhinitis 10/23/2017            The following portions of the patient's history were reviewed and updated as appropriate: allergies, current medications, past family history, past medical history, past social history, past surgical history and problem list      History reviewed  No pertinent past medical history  Past Surgical History:   Procedure Laterality Date    NO PAST SURGERIES         Family History   Problem Relation Age of Onset    Aneurysm Father         cardiac    Heart disease Father         problem    Leukemia Brother         chronic lymphoid    Aneurysm Paternal Grandfather         cardiac     Throat cancer Family     Alcohol abuse Neg Hx     Substance Abuse Neg Hx     Mental illness Neg Hx          Medications have been verified  Objective   /66   Pulse 97   Temp (!) 97 2 °F (36 2 °C)   Resp 18   Ht 5' 1" (1 549 m)   Wt 59 kg (130 lb)   SpO2 100%   BMI 24 56 kg/m²        Physical Exam     Physical Exam   Constitutional: She appears well-developed and well-nourished  HENT:   Right Ear: Hearing, external ear and ear canal normal  Tympanic membrane is bulging  Left Ear: Hearing, external ear and ear canal normal  Tympanic membrane is bulging  Nose: Mucosal edema and rhinorrhea present  Mouth/Throat: Posterior oropharyngeal erythema present     Cardiovascular: Normal rate, regular rhythm and normal heart sounds  Pulmonary/Chest: Effort normal and breath sounds normal    Nursing note and vitals reviewed

## 2019-12-19 ENCOUNTER — OFFICE VISIT (OUTPATIENT)
Dept: FAMILY MEDICINE CLINIC | Facility: CLINIC | Age: 24
End: 2019-12-19
Payer: COMMERCIAL

## 2019-12-19 VITALS
HEART RATE: 82 BPM | TEMPERATURE: 98.4 F | BODY MASS INDEX: 24.96 KG/M2 | DIASTOLIC BLOOD PRESSURE: 70 MMHG | SYSTOLIC BLOOD PRESSURE: 104 MMHG | HEIGHT: 61 IN | RESPIRATION RATE: 14 BRPM | WEIGHT: 132.2 LBS

## 2019-12-19 DIAGNOSIS — J06.9 VIRAL UPPER RESPIRATORY TRACT INFECTION: ICD-10-CM

## 2019-12-19 DIAGNOSIS — R05.9 COUGH: Primary | ICD-10-CM

## 2019-12-19 PROCEDURE — 1036F TOBACCO NON-USER: CPT | Performed by: NURSE PRACTITIONER

## 2019-12-19 PROCEDURE — 3008F BODY MASS INDEX DOCD: CPT | Performed by: NURSE PRACTITIONER

## 2019-12-19 PROCEDURE — 99213 OFFICE O/P EST LOW 20 MIN: CPT | Performed by: NURSE PRACTITIONER

## 2019-12-19 RX ORDER — PREDNISONE 10 MG/1
TABLET ORAL
Qty: 20 TABLET | Refills: 0 | Status: SHIPPED | OUTPATIENT
Start: 2019-12-19 | End: 2020-04-04 | Stop reason: ALTCHOICE

## 2019-12-19 RX ORDER — DIPHENOXYLATE HYDROCHLORIDE AND ATROPINE SULFATE 2.5; .025 MG/1; MG/1
1 TABLET ORAL DAILY
COMMUNITY
End: 2020-11-15

## 2019-12-19 RX ORDER — BIOTIN 10000 MCG
CAPSULE ORAL DAILY
COMMUNITY
End: 2020-11-15

## 2019-12-19 NOTE — PROGRESS NOTES
Assessment/Plan:      1  Cough with URI   Increase fluids and rest  Use OTC cough medication as needed  Steam can be helpful for sinus and nasal congestion, saline gargles for sore throat we have also prescribed the prednisone for this  Take tylenol or advil for body aches or fevers  Call if this is not improving    - Peak flow  - predniSONE 10 mg tablet; Take 4,4,3,3,2,2,1,1  Dispense: 20 tablet; Refill: 0   rto prn       Subjective:      Patient ID: Ramírez Feng is a 30 y o  female  Here today with concern regarding URI symptoms  [facility] to runny nose nad congestion  Had L ear was clogged  Was dx with URI and was rx cough medication  Has now driopped to chest and cough is productinve  Np fevers  Did have body aches/ has been liberty claritin with good relief  Cough can keep her up at night  OBJECTIVE  History reviewed  No pertinent past medical history  temporarily  Wt Readings from Last 3 Encounters:   12/19/19 60 kg (132 lb 3 2 oz)   12/14/19 59 kg (130 lb)   11/17/19 59 9 kg (132 lb)     BP Readings from Last 3 Encounters:   12/19/19 104/70   12/14/19 118/66   11/17/19 121/72     Pulse Readings from Last 3 Encounters:   12/19/19 82   12/14/19 97   11/17/19 83     BMI Readings from Last 3 Encounters:   12/19/19 25 39 kg/m²   12/14/19 24 56 kg/m²   11/17/19 24 94 kg/m²        Physical Exam   Constitutional: She appears well-developed and well-nourished  HENT:   Right Ear: Tympanic membrane normal  Tympanic membrane is not injected and not erythematous  Left Ear: Tympanic membrane normal  Tympanic membrane is not injected and not erythematous  Nose: Mucosal edema and rhinorrhea present  Mouth/Throat: Oropharynx is clear and moist and mucous membranes are normal    Neck: Normal range of motion  Neck supple  Cardiovascular: Normal rate and regular rhythm     Pulmonary/Chest: Effort normal and breath sounds normal

## 2020-01-04 ENCOUNTER — OFFICE VISIT (OUTPATIENT)
Dept: URGENT CARE | Facility: MEDICAL CENTER | Age: 25
End: 2020-01-04
Payer: COMMERCIAL

## 2020-01-04 VITALS
OXYGEN SATURATION: 100 % | BODY MASS INDEX: 24.55 KG/M2 | HEART RATE: 83 BPM | SYSTOLIC BLOOD PRESSURE: 120 MMHG | WEIGHT: 130 LBS | RESPIRATION RATE: 18 BRPM | HEIGHT: 61 IN | DIASTOLIC BLOOD PRESSURE: 74 MMHG | TEMPERATURE: 99.4 F

## 2020-01-04 DIAGNOSIS — J11.1 INFLUENZA-LIKE SYNDROME: Primary | ICD-10-CM

## 2020-01-04 PROCEDURE — S9088 SERVICES PROVIDED IN URGENT: HCPCS | Performed by: FAMILY MEDICINE

## 2020-01-04 PROCEDURE — 99213 OFFICE O/P EST LOW 20 MIN: CPT | Performed by: FAMILY MEDICINE

## 2020-01-04 RX ORDER — FLUTICASONE PROPIONATE 50 MCG
2 SPRAY, SUSPENSION (ML) NASAL DAILY
Qty: 16 G | Refills: 0 | Status: SHIPPED | OUTPATIENT
Start: 2020-01-04 | End: 2020-07-27 | Stop reason: ALTCHOICE

## 2020-01-04 RX ORDER — BENZONATATE 100 MG/1
100 CAPSULE ORAL 3 TIMES DAILY PRN
Qty: 20 CAPSULE | Refills: 0 | Status: SHIPPED | OUTPATIENT
Start: 2020-01-04 | End: 2020-04-04 | Stop reason: SDUPTHER

## 2020-01-04 RX ORDER — OSELTAMIVIR PHOSPHATE 75 MG/1
75 CAPSULE ORAL EVERY 12 HOURS SCHEDULED
Qty: 10 CAPSULE | Refills: 0 | Status: SHIPPED | OUTPATIENT
Start: 2020-01-04 | End: 2020-01-09

## 2020-01-04 NOTE — PROGRESS NOTES
[facility]        NAME: Jazmin Palencia is a 30 y o  female  : 1995    MRN: [id number]  DATE: 2020  TIME: 4:43 PM    Assessment and Plan   Influenza-like syndrome [J11 1]  1  Influenza-like syndrome  oseltamivir (TAMIFLU) 75 mg capsule    benzonatate (TESSALON PERLES) 100 mg capsule    fluticasone (FLONASE) 50 mcg/act nasal spray         Patient Instructions       Follow up with PCP in 3-5 days  Proceed to  ER if symptoms worsen  Chief Complaint     Chief Complaint   Patient presents with    Cold Like Symptoms     Pt c/o a productive cough since last night with chest and sinus congestion  Denies N/V/D  History of Present Illness       33-year-old female here today with cold symptoms since last night  Symptoms consist of severe nasal congestion, postnasal drip, mild sore throat  Denies history of asthma  Cough productive sputum  Some shortness of breath  Complaining pleuritic chest pain when coughing  Taking cough drops with mild improvement  Also complaining severe body aches, fatigue  She informs me that her son was recently diagnosed with flu and placed on Tamiflu  Her daughter was also treated empirically for exposure to flu  Review of Systems   Review of Systems   Constitutional: Positive for fatigue and fever  HENT: Positive for congestion  Respiratory: Positive for cough and shortness of breath  Musculoskeletal: Positive for arthralgias and myalgias           Current Medications       Current Outpatient Medications:     Biotin 10 MG CAPS, Take by mouth daily, Disp: , Rfl:     Levonorgestrel (LILETTA, 52 MG,) 19 5 MCG/DAY IUD, Liletta 19 5 mcg/24 hrs (5 yrs) 52 mg intrauterine device  Take 1 device by intrauterine route , Disp: , Rfl:     multivitamin (THERAGRAN) TABS, Take 1 tablet by mouth daily, Disp: , Rfl:     Probiotic Product (PROBIOTIC-10 PO), Probiotic, Disp: , Rfl:     benzonatate (TESSALON PERLES) 100 mg capsule, Take 1 capsule (100 mg total) by mouth 3 (three) times a day as needed for cough, Disp: 20 capsule, Rfl: 0    brompheniramine-pseudoephedrine-DM 30-2-10 MG/5ML syrup, Take 10 mL by mouth 3 (three) times a day as needed for congestion or cough (Patient not taking: Reported on 1/4/2020), Disp: 120 mL, Rfl: 0    fluticasone (FLONASE) 50 mcg/act nasal spray, 2 sprays into each nostril daily, Disp: 16 g, Rfl: 0    oseltamivir (TAMIFLU) 75 mg capsule, Take 1 capsule (75 mg total) by mouth every 12 (twelve) hours for 5 days, Disp: 10 capsule, Rfl: 0    predniSONE 10 mg tablet, Take 4,4,3,3,2,2,1,1 (Patient not taking: Reported on 1/4/2020), Disp: 20 tablet, Rfl: 0    Current Allergies     Allergies as of 01/04/2020 - Reviewed 01/04/2020   Allergen Reaction Noted    Other Allergic Rhinitis 10/23/2017            The following portions of the patient's history were reviewed and updated as appropriate: allergies, current medications, past family history, past medical history, past social history, past surgical history and problem list      History reviewed  No pertinent past medical history  Past Surgical History:   Procedure Laterality Date    NO PAST SURGERIES         Family History   Problem Relation Age of Onset    Aneurysm Father         cardiac    Heart disease Father         problem    Leukemia Brother         chronic lymphoid    Aneurysm Paternal Grandfather         cardiac     Throat cancer Family     Alcohol abuse Neg Hx     Substance Abuse Neg Hx     Mental illness Neg Hx          Medications have been verified  Objective   /74 (BP Location: Left arm, Patient Position: Sitting, Cuff Size: Standard)   Pulse 83   Temp 99 4 °F (37 4 °C) (Tympanic)   Resp 18   Ht 5' 1" (1 549 m)   Wt 59 kg (130 lb)   SpO2 100%   BMI 24 56 kg/m²        Physical Exam     Physical Exam   HENT:   Markedly hypertrophic turbinates bilaterally  Cobblestoning observed the posterior pharynx  No exudates visualized  Neck: Normal range of motion  Cardiovascular: Normal rate, regular rhythm and normal heart sounds     Pulmonary/Chest: Effort normal and breath sounds normal

## 2020-01-04 NOTE — PATIENT INSTRUCTIONS
I prescribed fluticasone nasal spray-2 sprays in each nostril once daily, Tamiflu 75 mg capsule twice a day for 5 days, Tessalon Perles 100 mg q 8 hours  Advised patient to take Tylenol ibuprofen as needed for fever body ache  I also recommend patient increase fluid intake  Follow up with primary care provider symptoms worsen  Influenza   WHAT YOU NEED TO KNOW:   Influenza (the flu) is an infection caused by the influenza virus  The flu is easily spread when an infected person coughs, sneezes, or has close contact with others  You may be able to spread the flu to others for 1 week or longer after signs or symptoms appear  DISCHARGE INSTRUCTIONS:   Call 911 for any of the following:   · You have trouble breathing, and your lips look purple or blue  · You have a seizure  Seek care immediately if:   · You are dizzy, or you are urinating less or not at all  · You have a headache with a stiff neck, and you feel tired or confused  · You have new pain or pressure in your chest     · Your symptoms, such as shortness of breath, vomiting, or diarrhea, get worse  · Your symptoms, such as fever and coughing, seem to get better, but then get worse  Contact your healthcare provider if:   · You have new muscle pain or weakness  · You have questions or concerns about your condition or care  Medicines: You may need any of the following:  · Acetaminophen  decreases pain and fever  It is available without a doctor's order  Ask how much to take and how often to take it  Follow directions  Acetaminophen can cause liver damage if not taken correctly  · NSAIDs , such as ibuprofen, help decrease swelling, pain, and fever  This medicine is available with or without a doctor's order  NSAIDs can cause stomach bleeding or kidney problems in certain people  If you take blood thinner medicine, always ask your healthcare provider if NSAIDs are safe for you   Always read the medicine label and follow directions  · Antivirals  help fight a viral infection  · Take your medicine as directed  Contact your healthcare provider if you think your medicine is not helping or if you have side effects  Tell him or her if you are allergic to any medicine  Keep a list of the medicines, vitamins, and herbs you take  Include the amounts, and when and why you take them  Bring the list or the pill bottles to follow-up visits  Carry your medicine list with you in case of an emergency  Rest  as much as you can to help you recover  Drink liquids as directed  to help prevent dehydration  Ask how much liquid to drink each day and which liquids are best for you  Prevent the spread of influenza:   · Wash your hands often  Use soap and water  Wash your hands after you use the bathroom, change a child's diapers, or sneeze  Wash your hands before you prepare or eat food  Use gel hand cleanser when soap and water are not available  Do not touch your eyes, nose, or mouth unless you have washed your hands first            · Cover your mouth when you sneeze or cough  Cough into a tissue or the bend of your arm  · Clean shared items with a germ-killing   Clean table surfaces, doorknobs, and light switches  Do not share towels, silverware, and dishes with people who are sick  Wash bed sheets, towels, silverware, and dishes with soap and water  · Wear a mask  over your mouth and nose if you are sick or are near anyone who is sick  · Stay away from others  if you are sick  · Influenza vaccine  helps prevent influenza (flu)  Everyone older than 6 months should get a yearly influenza vaccine  Get the vaccine as soon as it is available, usually in September or October each year  Follow up with your healthcare provider as directed:  Write down your questions so you remember to ask them during your visits     © 2017 [employer] Information is for End User's use only and may not be sold, redistributed or otherwise used for commercial purposes  All illustrations and images included in CareNotes® are the copyrighted property of [employer]  or [employer]  The above information is an  only  It is not intended as medical advice for individual conditions or treatments  Talk to your doctor, nurse or pharmacist before following any medical regimen to see if it is safe and effective for you

## 2020-04-04 ENCOUNTER — TELEPHONE (OUTPATIENT)
Dept: OTHER | Facility: OTHER | Age: 25
End: 2020-04-04

## 2020-04-04 DIAGNOSIS — Z20.828 EXPOSURE TO SARS-ASSOCIATED CORONAVIRUS: ICD-10-CM

## 2020-04-04 DIAGNOSIS — J11.1 INFLUENZA-LIKE SYNDROME: ICD-10-CM

## 2020-04-04 DIAGNOSIS — Z20.828 EXPOSURE TO SARS-ASSOCIATED CORONAVIRUS: Primary | ICD-10-CM

## 2020-04-04 DIAGNOSIS — B34.9 VIRAL ILLNESS: ICD-10-CM

## 2020-04-04 PROCEDURE — 87635 SARS-COV-2 COVID-19 AMP PRB: CPT

## 2020-04-04 PROCEDURE — G2012 BRIEF CHECK IN BY MD/QHP: HCPCS | Performed by: FAMILY MEDICINE

## 2020-04-04 RX ORDER — ALBUTEROL SULFATE 90 UG/1
2 AEROSOL, METERED RESPIRATORY (INHALATION) EVERY 6 HOURS PRN
Qty: 1 INHALER | Refills: 5 | Status: SHIPPED | OUTPATIENT
Start: 2020-04-04 | End: 2020-07-27 | Stop reason: ALTCHOICE

## 2020-04-04 RX ORDER — BENZONATATE 100 MG/1
100 CAPSULE ORAL 3 TIMES DAILY PRN
Qty: 20 CAPSULE | Refills: 0 | Status: SHIPPED | OUTPATIENT
Start: 2020-04-04 | End: 2020-04-06 | Stop reason: ALTCHOICE

## 2020-04-05 ENCOUNTER — NURSE TRIAGE (OUTPATIENT)
Dept: OTHER | Facility: OTHER | Age: 25
End: 2020-04-05

## 2020-04-06 ENCOUNTER — TELEMEDICINE (OUTPATIENT)
Dept: FAMILY MEDICINE CLINIC | Facility: CLINIC | Age: 25
End: 2020-04-06
Payer: COMMERCIAL

## 2020-04-06 ENCOUNTER — TELEPHONE (OUTPATIENT)
Dept: FAMILY MEDICINE CLINIC | Facility: CLINIC | Age: 25
End: 2020-04-06

## 2020-04-06 VITALS — BODY MASS INDEX: 24.75 KG/M2 | WEIGHT: 131 LBS | TEMPERATURE: 96.5 F

## 2020-04-06 DIAGNOSIS — R42 VERTIGO: Primary | ICD-10-CM

## 2020-04-06 DIAGNOSIS — B34.9 VIRAL ILLNESS: ICD-10-CM

## 2020-04-06 LAB — SARS-COV-2 RNA SPEC QL NAA+PROBE: NOT DETECTED

## 2020-04-06 PROCEDURE — 99214 OFFICE O/P EST MOD 30 MIN: CPT | Performed by: FAMILY MEDICINE

## 2020-04-06 RX ORDER — MECLIZINE HYDROCHLORIDE 25 MG/1
25 TABLET ORAL EVERY 8 HOURS PRN
Qty: 10 TABLET | Refills: 0 | Status: SHIPPED | OUTPATIENT
Start: 2020-04-06 | End: 2020-07-27 | Stop reason: ALTCHOICE

## 2020-07-27 ENCOUNTER — OFFICE VISIT (OUTPATIENT)
Dept: FAMILY MEDICINE CLINIC | Facility: CLINIC | Age: 25
End: 2020-07-27
Payer: COMMERCIAL

## 2020-07-27 VITALS
RESPIRATION RATE: 16 BRPM | DIASTOLIC BLOOD PRESSURE: 70 MMHG | HEART RATE: 90 BPM | TEMPERATURE: 97.4 F | WEIGHT: 132 LBS | HEIGHT: 61 IN | SYSTOLIC BLOOD PRESSURE: 102 MMHG | BODY MASS INDEX: 24.92 KG/M2

## 2020-07-27 DIAGNOSIS — J02.9 SORE THROAT: ICD-10-CM

## 2020-07-27 DIAGNOSIS — J06.9 UPPER RESPIRATORY TRACT INFECTION, UNSPECIFIED TYPE: Primary | ICD-10-CM

## 2020-07-27 LAB — S PYO AG THROAT QL: NEGATIVE

## 2020-07-27 PROCEDURE — 99213 OFFICE O/P EST LOW 20 MIN: CPT | Performed by: FAMILY MEDICINE

## 2020-07-27 PROCEDURE — 87070 CULTURE OTHR SPECIMN AEROBIC: CPT | Performed by: FAMILY MEDICINE

## 2020-07-27 PROCEDURE — 3008F BODY MASS INDEX DOCD: CPT | Performed by: FAMILY MEDICINE

## 2020-07-27 PROCEDURE — 1036F TOBACCO NON-USER: CPT | Performed by: FAMILY MEDICINE

## 2020-07-27 PROCEDURE — 87880 STREP A ASSAY W/OPTIC: CPT | Performed by: FAMILY MEDICINE

## 2020-07-27 NOTE — PROGRESS NOTES
Assessment/Plan:     Diagnosis ICD-10-CM Associated Orders   1  Upper respiratory tract infection, unspecified type J06 9    2  Sore throat J02 9 POCT rapid strepA     Throat culture     Plan:   - rapid strep was negative, will obtain throat culture and call patient with results, discussed plenty of fluids, Tylenol as needed, salt water gargles and advised to follow up if her symptoms persist or worsen  The patient understands and agrees with the treatment plan  Subjective:   Chief Complaint   Patient presents with    Sore Throat    Earache     B/L    Generalized Body Aches     mostly back and neck       Patient ID: Kaylene Baltazar is a 30 y o  female who presents today with complaining of nasal congestion, sore throat, ear fullness and feeling achy onset yesterday  No fever or chills, no cough, no chest pain, no wheezing or shortness of breath  Her 3 year son is currently being treated for sinus infection and her 8 months old is also now having URI symptoms  The following portions of the patient's history were reviewed and updated as appropriate: allergies, current medications, past family history, past medical history, past social history, past surgical history and problem list     History reviewed  No pertinent past medical history    Past Surgical History:   Procedure Laterality Date    NO PAST SURGERIES       Family History   Problem Relation Age of Onset    Aneurysm Father         cardiac    Heart disease Father         problem    Leukemia Brother         chronic lymphoid    Aneurysm Paternal Grandfather         cardiac     Throat cancer Family     Alcohol abuse Neg Hx     Substance Abuse Neg Hx     Mental illness Neg Hx      Social History     Socioeconomic History    Marital status: /Civil Union     Spouse name: Not on file    Number of children: Not on file    Years of education: Not on file    Highest education level: Not on file   Occupational History    Not on file   Social Needs    Financial resource strain: Not on file    Food insecurity:     Worry: Not on file     Inability: Not on file    Transportation needs:     Medical: Not on file     Non-medical: Not on file   Tobacco Use    Smoking status: Never Smoker    Smokeless tobacco: Never Used   Substance and Sexual Activity    Alcohol use: No    Drug use: No    Sexual activity: Yes     Partners: Male   Lifestyle    Physical activity:     Days per week: Not on file     Minutes per session: Not on file    Stress: Not on file   Relationships    Social connections:     Talks on phone: Not on file     Gets together: Not on file     Attends Yarsani service: Not on file     Active member of club or organization: Not on file     Attends meetings of clubs or organizations: Not on file     Relationship status: Not on file    Intimate partner violence:     Fear of current or ex partner: Not on file     Emotionally abused: Not on file     Physically abused: Not on file     Forced sexual activity: Not on file   Other Topics Concern    Not on file   Social History Narrative    Occasional caffeine consumption       Current Outpatient Medications:     Biotin 10 MG CAPS, Take by mouth daily, Disp: , Rfl:     Levonorgestrel (LILETTA, 52 MG,) 19 5 MCG/DAY IUD, Liletta 19 5 mcg/24 hrs (5 yrs) 52 mg intrauterine device  Take 1 device by intrauterine route , Disp: , Rfl:     multivitamin (THERAGRAN) TABS, Take 1 tablet by mouth daily, Disp: , Rfl:     Probiotic Product (PROBIOTIC-10 PO), Probiotic, Disp: , Rfl:     Review of Systems   Constitutional: Positive for fatigue  Negative for chills and fever  HENT: Positive for congestion, rhinorrhea and sore throat  Negative for ear discharge, ear pain, trouble swallowing and voice change  Respiratory: Negative for cough, shortness of breath and wheezing  Cardiovascular: Negative for chest pain and palpitations     Gastrointestinal: Negative for abdominal pain, diarrhea, nausea and vomiting  Neurological: Negative for dizziness and headaches  Hematological: Negative for adenopathy  Objective:    Vitals:    07/27/20 1110   BP: 102/70   BP Location: Left arm   Patient Position: Sitting   Cuff Size: Standard   Pulse: 90   Resp: 16   Temp: (!) 97 4 °F (36 3 °C)   Weight: 59 9 kg (132 lb)   Height: 5' 0 75" (1 543 m)        Physical Exam   Constitutional: She is oriented to person, place, and time  She appears well-developed and well-nourished  No distress  HENT:   Head: Normocephalic and atraumatic  Right Ear: Tympanic membrane and ear canal normal    Left Ear: Tympanic membrane and ear canal normal    Nose: Mucosal edema present  Mouth/Throat: Posterior oropharyngeal erythema present  No oropharyngeal exudate  Neck: Neck supple  Cardiovascular: Normal rate, regular rhythm and normal heart sounds  No murmur heard  Pulmonary/Chest: Effort normal and breath sounds normal  No respiratory distress  She has no wheezes  She has no rhonchi  She has no rales  Lymphadenopathy:     She has no cervical adenopathy  Neurological: She is alert and oriented to person, place, and time        Office Visit on 07/27/2020   Component Date Value Ref Range Status     RAPID STREP A 07/27/2020 Negative  Negative Final

## 2020-07-29 ENCOUNTER — TELEPHONE (OUTPATIENT)
Dept: FAMILY MEDICINE CLINIC | Facility: CLINIC | Age: 25
End: 2020-07-29

## 2020-07-29 LAB — BACTERIA THROAT CULT: NORMAL

## 2020-07-29 NOTE — TELEPHONE ENCOUNTER
----- Message from Winsome Gao MD sent at 7/29/2020 11:08 AM EDT -----  Please let patient know that her throat culture was negative

## 2020-09-24 ENCOUNTER — OFFICE VISIT (OUTPATIENT)
Dept: URGENT CARE | Facility: MEDICAL CENTER | Age: 25
End: 2020-09-24
Payer: COMMERCIAL

## 2020-09-24 VITALS
RESPIRATION RATE: 16 BRPM | BODY MASS INDEX: 24.55 KG/M2 | HEIGHT: 61 IN | TEMPERATURE: 97.2 F | DIASTOLIC BLOOD PRESSURE: 72 MMHG | WEIGHT: 130 LBS | SYSTOLIC BLOOD PRESSURE: 118 MMHG | OXYGEN SATURATION: 98 % | HEART RATE: 88 BPM

## 2020-09-24 DIAGNOSIS — J02.9 SORE THROAT: Primary | ICD-10-CM

## 2020-09-24 DIAGNOSIS — J02.0 STREP THROAT: ICD-10-CM

## 2020-09-24 LAB — S PYO AG THROAT QL: POSITIVE

## 2020-09-24 PROCEDURE — S9088 SERVICES PROVIDED IN URGENT: HCPCS | Performed by: FAMILY MEDICINE

## 2020-09-24 PROCEDURE — 87880 STREP A ASSAY W/OPTIC: CPT | Performed by: FAMILY MEDICINE

## 2020-09-24 PROCEDURE — 99213 OFFICE O/P EST LOW 20 MIN: CPT | Performed by: FAMILY MEDICINE

## 2020-09-24 RX ORDER — LIDOCAINE HYDROCHLORIDE 20 MG/ML
15 SOLUTION OROPHARYNGEAL 4 TIMES DAILY PRN
Qty: 100 ML | Refills: 0 | Status: SHIPPED | OUTPATIENT
Start: 2020-09-24 | End: 2020-11-15

## 2020-09-24 RX ORDER — AMOXICILLIN 875 MG/1
875 TABLET, COATED ORAL 2 TIMES DAILY
Qty: 20 TABLET | Refills: 0 | Status: SHIPPED | OUTPATIENT
Start: 2020-09-24 | End: 2020-10-04

## 2020-09-24 RX ORDER — FLUTICASONE PROPIONATE 50 MCG
2 SPRAY, SUSPENSION (ML) NASAL DAILY
Qty: 16 G | Refills: 0 | Status: SHIPPED | OUTPATIENT
Start: 2020-09-24 | End: 2021-03-28

## 2020-09-25 NOTE — PROGRESS NOTES
[facility] Now        NAME: Gisell Lu is a 30 y o  female  : 1995    MRN: [id number]  DATE: 2020  TIME: 10:13 PM    Assessment and Plan   Sore throat [J02 9]  1  Sore throat  POCT rapid strepA   2  Strep throat  amoxicillin (AMOXIL) 875 mg tablet    Lidocaine Viscous HCl (XYLOCAINE) 2 % mucosal solution    fluticasone (FLONASE) 50 mcg/act nasal spray         Patient Instructions       Follow up with PCP in 3-5 days  Proceed to  ER if symptoms worsen  Chief Complaint     Chief Complaint   Patient presents with    Cold Like Symptoms     Patient relates started with a sore throat today  Yesterday had body aches  Denies fever  Denies cough, chest pain and shortness of breath  C/O bilateral ear pain and clogged  + sneezing  No exposure to anyone positive for COVID  Son has a viral illness  History of Present Illness       42-year-old female here today with 1 day history of severe body aches yesterday  Body aches have the minutes today however she developed some sore throat which is burning in nature accompanied by nasal congestion and pressure in her ears  Denies fever  She has been taking Tylenol with for symptoms with minimal improvement  Denies any recent sick exposure  Review of Systems   Review of Systems   Constitutional: Negative  HENT: Positive for congestion, ear pain and sore throat  Respiratory: Negative            Current Medications       Current Outpatient Medications:     Biotin 10 MG CAPS, Take by mouth daily, Disp: , Rfl:     Levonorgestrel (LILETTA, 52 MG,) 19 5 MCG/DAY IUD, Liletta 19 5 mcg/24 hrs (5 yrs) 52 mg intrauterine device  Take 1 device by intrauterine route , Disp: , Rfl:     multivitamin (THERAGRAN) TABS, Take 1 tablet by mouth daily, Disp: , Rfl:     Probiotic Product (PROBIOTIC-10 PO), Probiotic, Disp: , Rfl:     amoxicillin (AMOXIL) 875 mg tablet, Take 1 tablet (875 mg total) by mouth 2 (two) times a day for 10 days, Disp: 20 tablet, Rfl: 0    fluticasone (FLONASE) 50 mcg/act nasal spray, 2 sprays into each nostril daily, Disp: 16 g, Rfl: 0    Lidocaine Viscous HCl (XYLOCAINE) 2 % mucosal solution, Swish and spit 15 mL 4 (four) times a day as needed for mouth pain or discomfort, Disp: 100 mL, Rfl: 0    Current Allergies     Allergies as of 09/24/2020 - Reviewed 09/24/2020   Allergen Reaction Noted    Other Allergic Rhinitis 10/23/2017            The following portions of the patient's history were reviewed and updated as appropriate: allergies, current medications, past family history, past medical history, past social history, past surgical history and problem list      History reviewed  No pertinent past medical history  Past Surgical History:   Procedure Laterality Date    NO PAST SURGERIES         Family History   Problem Relation Age of Onset    Aneurysm Father         cardiac    Heart disease Father         problem    Leukemia Brother         chronic lymphoid    Aneurysm Paternal Grandfather         cardiac     Throat cancer Family     Alcohol abuse Neg Hx     Substance Abuse Neg Hx     Mental illness Neg Hx          Medications have been verified  Objective   /72   Pulse 88   Temp (!) 97 2 °F (36 2 °C) (Tympanic)   Resp 16   Ht 5' 1" (1 549 m)   Wt 59 kg (130 lb)   LMP 09/14/2020   SpO2 98%   BMI 24 56 kg/m²        Physical Exam     Physical Exam  Vitals signs and nursing note reviewed  Constitutional:       Appearance: Normal appearance  HENT:      Right Ear: Tympanic membrane normal       Left Ear: Tympanic membrane normal       Nose:      Comments: Hypertrophic boggy left turbinates  Mouth/Throat:      Comments: Posterior pharynx is erythematous with cobblestoning  No exudates visualized  Cardiovascular:      Rate and Rhythm: Normal rate  Pulmonary:      Effort: Pulmonary effort is normal       Breath sounds: Normal breath sounds     Lymphadenopathy:      Cervical: Cervical adenopathy present  Neurological:      Mental Status: She is alert

## 2020-09-25 NOTE — PATIENT INSTRUCTIONS
Rapid strep test positive  Patient started empirically on amoxicillin 875 mg b i d  For 10 days, xylocaine viscous as needed for sore throat  Fluticasone nasal spray for nasal congestion  Encourage patient to continue with Tylenol ibuprofen as needed for body aches or fever if present  Pharyngitis   WHAT YOU NEED TO KNOW:   Pharyngitis, or sore throat, is inflammation of the tissues and structures in your pharynx (throat)  Pharyngitis is most often caused by bacteria  It may also be caused by a cold or flu virus  Other causes include smoking, allergies, or acid reflux  DISCHARGE INSTRUCTIONS:   Call 911 for any of the following:   · You have trouble breathing or swallowing because your throat is swollen or sore  Return to the emergency department if:   · You are drooling because it hurts too much to swallow  · Your fever is higher than 102? F (39?C) or lasts longer than 3 days  · You are confused  · You taste blood in your throat  Contact your healthcare provider if:   · Your throat pain gets worse  · You have a painful lump in your throat that does not go away after 5 days  · Your symptoms do not improve after 5 days  · You have questions or concerns about your condition or care  Medicines:  Viral pharyngitis will go away on its own without treatment  Your sore throat should start to feel better in 3 to 5 days for both viral and bacterial infections  You may need any of the following:  · Antibiotics  treat a bacterial infection  · NSAIDs , such as ibuprofen, help decrease swelling, pain, and fever  NSAIDs can cause stomach bleeding or kidney problems in certain people  If you take blood thinner medicine, always ask your healthcare provider if NSAIDs are safe for you  Always read the medicine label and follow directions  · Acetaminophen  decreases pain and fever  It is available without a doctor's order  Ask how much to take and how often to take it  Follow directions  Acetaminophen can cause liver damage if not taken correctly  · Take your medicine as directed  Contact your healthcare provider if you think your medicine is not helping or if you have side effects  Tell him or her if you are allergic to any medicine  Keep a list of the medicines, vitamins, and herbs you take  Include the amounts, and when and why you take them  Bring the list or the pill bottles to follow-up visits  Carry your medicine list with you in case of an emergency  Manage your symptoms:   · Gargle salt water  Mix ¼ teaspoon salt in an 8 ounce glass of warm water and gargle  This may help decrease swelling in your throat  · Drink liquids as directed  You may need to drink more liquids than usual  Liquids may help soothe your throat and prevent dehydration  Ask how much liquid to drink each day and which liquids are best for you  · Use a cool-steam humidifier  to help moisten the air in your room and calm your cough  · Soothe your throat  with cough drops, ice, soft foods, or popsicles  Prevent the spread of pharyngitis:  Cover your mouth and nose when you cough or sneeze  Do not share food or drinks  Wash your hands often  Use soap and water  If soap and water are unavailable, use an alcohol based hand   Follow up with your healthcare provider as directed:  Write down your questions so you remember to ask them during your visits  © 2017 [address] [employer] [employer] Information is for End User's use only and may not be sold, redistributed or otherwise used for commercial purposes  All illustrations and images included in CareNotes® are the copyrighted property of [employer]  or Mike Fajardo  The above information is an  only  It is not intended as medical advice for individual conditions or treatments  Talk to your doctor, nurse or pharmacist before following any medical regimen to see if it is safe and effective for you

## 2020-10-15 PROCEDURE — G0145 SCR C/V CYTO,THINLAYER,RESCR: HCPCS | Performed by: OBSTETRICS & GYNECOLOGY

## 2020-10-16 ENCOUNTER — LAB REQUISITION (OUTPATIENT)
Dept: LAB | Facility: HOSPITAL | Age: 25
End: 2020-10-16
Payer: COMMERCIAL

## 2020-10-16 DIAGNOSIS — Z01.419 ENCOUNTER FOR GYNECOLOGICAL EXAMINATION (GENERAL) (ROUTINE) WITHOUT ABNORMAL FINDINGS: ICD-10-CM

## 2020-10-22 LAB
LAB AP GYN PRIMARY INTERPRETATION: NORMAL
LAB AP LMP: NORMAL
Lab: NORMAL

## 2020-11-15 ENCOUNTER — OFFICE VISIT (OUTPATIENT)
Dept: URGENT CARE | Facility: MEDICAL CENTER | Age: 25
End: 2020-11-15
Payer: COMMERCIAL

## 2020-11-15 VITALS
WEIGHT: 132 LBS | OXYGEN SATURATION: 100 % | BODY MASS INDEX: 24.92 KG/M2 | HEIGHT: 61 IN | HEART RATE: 73 BPM | RESPIRATION RATE: 16 BRPM | TEMPERATURE: 99.4 F | DIASTOLIC BLOOD PRESSURE: 80 MMHG | SYSTOLIC BLOOD PRESSURE: 112 MMHG

## 2020-11-15 DIAGNOSIS — J40 BRONCHITIS: ICD-10-CM

## 2020-11-15 DIAGNOSIS — J01.90 ACUTE SINUSITIS, RECURRENCE NOT SPECIFIED, UNSPECIFIED LOCATION: ICD-10-CM

## 2020-11-15 DIAGNOSIS — Z20.822 ENCOUNTER FOR LABORATORY TESTING FOR COVID-19 VIRUS: Primary | ICD-10-CM

## 2020-11-15 PROCEDURE — 87637 SARSCOV2&INF A&B&RSV AMP PRB: CPT | Performed by: PHYSICIAN ASSISTANT

## 2020-11-15 PROCEDURE — 99213 OFFICE O/P EST LOW 20 MIN: CPT | Performed by: PHYSICIAN ASSISTANT

## 2020-11-15 PROCEDURE — S9088 SERVICES PROVIDED IN URGENT: HCPCS | Performed by: PHYSICIAN ASSISTANT

## 2020-11-15 RX ORDER — VALACYCLOVIR HYDROCHLORIDE 500 MG/1
TABLET, FILM COATED ORAL AS NEEDED
COMMUNITY

## 2020-11-15 RX ORDER — AZITHROMYCIN 250 MG/1
TABLET, FILM COATED ORAL
Qty: 6 TABLET | Refills: 0 | Status: SHIPPED | OUTPATIENT
Start: 2020-11-15 | End: 2020-11-19

## 2020-11-15 RX ORDER — ALBUTEROL SULFATE 90 UG/1
AEROSOL, METERED RESPIRATORY (INHALATION)
COMMUNITY
End: 2021-05-11 | Stop reason: SDUPTHER

## 2020-11-18 LAB
FLUAV RNA NPH QL NAA+PROBE: NOT DETECTED
FLUBV RNA NPH QL NAA+PROBE: NOT DETECTED
RSV RNA NPH QL NAA+PROBE: NOT DETECTED
SARS-COV-2 RNA NPH QL NAA+PROBE: NOT DETECTED

## 2020-11-19 ENCOUNTER — TELEPHONE (OUTPATIENT)
Dept: FAMILY MEDICINE CLINIC | Facility: CLINIC | Age: 25
End: 2020-11-19

## 2021-03-28 ENCOUNTER — OFFICE VISIT (OUTPATIENT)
Dept: URGENT CARE | Facility: MEDICAL CENTER | Age: 26
End: 2021-03-28
Payer: COMMERCIAL

## 2021-03-28 VITALS — TEMPERATURE: 98.1 F | OXYGEN SATURATION: 100 % | HEART RATE: 82 BPM | RESPIRATION RATE: 15 BRPM

## 2021-03-28 DIAGNOSIS — J30.9 ALLERGIC RHINITIS, UNSPECIFIED SEASONALITY, UNSPECIFIED TRIGGER: ICD-10-CM

## 2021-03-28 DIAGNOSIS — J02.9 SORE THROAT: Primary | ICD-10-CM

## 2021-03-28 DIAGNOSIS — B34.9 VIRAL SYNDROME: ICD-10-CM

## 2021-03-28 LAB — S PYO AG THROAT QL: NEGATIVE

## 2021-03-28 PROCEDURE — 87635 SARS-COV-2 COVID-19 AMP PRB: CPT | Performed by: FAMILY MEDICINE

## 2021-03-28 PROCEDURE — 87070 CULTURE OTHR SPECIMN AEROBIC: CPT | Performed by: FAMILY MEDICINE

## 2021-03-28 PROCEDURE — 87880 STREP A ASSAY W/OPTIC: CPT | Performed by: FAMILY MEDICINE

## 2021-03-28 PROCEDURE — 99213 OFFICE O/P EST LOW 20 MIN: CPT | Performed by: FAMILY MEDICINE

## 2021-03-28 PROCEDURE — S9088 SERVICES PROVIDED IN URGENT: HCPCS | Performed by: FAMILY MEDICINE

## 2021-03-28 RX ORDER — LEVONORGESTREL AND ETHINYL ESTRADIOL 0.1-0.02MG
KIT ORAL DAILY
COMMUNITY
Start: 2021-01-19

## 2021-03-28 RX ORDER — LIDOCAINE HYDROCHLORIDE 20 MG/ML
15 SOLUTION OROPHARYNGEAL 4 TIMES DAILY PRN
Qty: 100 ML | Refills: 0 | Status: SHIPPED | OUTPATIENT
Start: 2021-03-28 | End: 2022-04-25

## 2021-03-28 RX ORDER — FLUTICASONE PROPIONATE 50 MCG
2 SPRAY, SUSPENSION (ML) NASAL DAILY
Qty: 16 G | Refills: 0 | Status: SHIPPED | OUTPATIENT
Start: 2021-03-28

## 2021-03-29 ENCOUNTER — TELEPHONE (OUTPATIENT)
Dept: URGENT CARE | Facility: MEDICAL CENTER | Age: 26
End: 2021-03-29

## 2021-03-29 LAB — SARS-COV-2 RNA RESP QL NAA+PROBE: NEGATIVE

## 2021-03-29 NOTE — PATIENT INSTRUCTIONS
Rapid strep test-     Throat culture performed  COVID-19 test performed  I prescribed fluticasone nasal spray- 2 sprays in each nostril once daily, xylocaine viscous as needed for sore throat  Recommended patient take Tylenol Motrin as needed  She is to remain in quarantine until test results are known  She expressed understanding    Allergic Rhinitis   WHAT YOU NEED TO KNOW:   Allergic rhinitis, or hay fever, is swelling of the inside of your nose  The swelling is a reaction to allergens in the air  An allergen can be anything that causes an allergic reaction  Allergies to weeds, grass, trees, or mold often cause seasonal allergic rhinitis  Indoor dust mites, cockroaches, pet dander, or mold can also cause allergic rhinitis  DISCHARGE INSTRUCTIONS:   Call 911 for the following:   · You have chest pain or shortness of breath  Return to the emergency department if:   · You have severe pain  · You cough up blood  Contact your healthcare provider if:   · You have a fever  · You have ear or sinus pain, or a headache  · Your symptoms get worse, even after treatment  · You have yellow, green, brown, or bloody mucus coming from your nose  · Your nose is bleeding or you have pain inside your nose  · You have trouble sleeping because of your symptoms  · You have questions or concerns about your condition or care  Medicines:   · Medicines  help decrease your symptoms and clear your stuffy nose  · Take your medicine as directed  Contact your healthcare provider if you think your medicine is not helping or if you have side effects  Tell him of her if you are allergic to any medicine  Keep a list of the medicines, vitamins, and herbs you take  Include the amounts, and when and why you take them  Bring the list or the pill bottles to follow-up visits  Carry your medicine list with you in case of an emergency      How to manage allergic rhinitis:  The best way to manage allergic rhinitis is to avoid allergens that can trigger your symptoms  Any of the following may help decrease your symptoms:  · Rinse your nose and sinuses  with a salt water solution or use a salt water nasal spray  This will help thin the mucus in your nose and rinse away pollen and dirt  It will also help reduce swelling so you can breathe normally  Ask your healthcare provider how often to rinse your nose  · Reduce exposure to dust mites  Wash sheets and towels in hot water every week  Cover your pillows and mattresses with allergen-free covers  Limit the number of stuffed animals and soft toys your child has  Wash your child's toys in hot water regularly  Vacuum weekly and use a vacuum  with an air filter  If possible, get rid of carpets and curtains  These collect dust and dust mites  · Reduce exposure to pollen  Keep windows and doors closed in your house and car  Stay inside when air pollution or the pollen count is high  Run your air conditioner on recycle, and change air filters often  Shower and wash your hair before bed every night to rinse away pollen  · Reduce exposure to pet dander  If possible, do not keep cats, dogs, birds, or other pets  If you do keep pets in your home, keep them out of bedrooms and carpeted rooms  Bathe them often  · Reduce exposure to mold  Do not spend time in basements  Choose artificial plants instead of live plants  Keep your home's humidity at less than 45%  Do not have ponds or standing water in your home or yard  · Do not smoke  Avoid others who smoke  Ask your healthcare provider for information if you currently smoke and need help to quit  Follow up with your healthcare provider as directed: You may need to see an allergist often to control your symptoms  Write down your questions so you remember to ask them during your visits    © Copyright [employer] [employer] [address] Information is for End User's use only and may not be sold, redistributed or otherwise used for commercial purposes  All illustrations and images included in CareNotes® are the copyrighted property of [employer] M [employer] Inc  or Divya Thurman  The above information is an  only  It is not intended as medical advice for individual conditions or treatments  Talk to your doctor, nurse or pharmacist before following any medical regimen to see if it is safe and effective for you

## 2021-03-29 NOTE — TELEPHONE ENCOUNTER
I spoke with the patient regarding COVID-19 test results that were negative  I explained to her that throat culture results are still pending  She informs me that allergy symptoms has slightly improved  She has test sinus pain pressure  Less congestion since he started Advil cold and Sinus along with fluticasone spray that I prescribed  She still complaining some throat irritation bed that also seems to be improving  She is afebrile

## 2021-03-29 NOTE — PROGRESS NOTES
[facility]        NAME: Dajuan Baca is a 30 y o  female  : 1995    MRN: [id number]  DATE: 2021  TIME: 9:21 PM    Assessment and Plan   Sore throat [J02 9]  1  Sore throat  POCT rapid strepA    Throat culture    Novel Coronavirus (Covid-19),PCR [facility] - Office Collection    fluticasone (FLONASE) 50 mcg/act nasal spray    Lidocaine Viscous HCl (XYLOCAINE) 2 % mucosal solution   2  Allergic rhinitis, unspecified seasonality, unspecified trigger  fluticasone (FLONASE) 50 mcg/act nasal spray    Lidocaine Viscous HCl (XYLOCAINE) 2 % mucosal solution   3  Viral syndrome  fluticasone (FLONASE) 50 mcg/act nasal spray    Lidocaine Viscous HCl (XYLOCAINE) 2 % mucosal solution         Patient Instructions       Follow up with PCP in 3-5 days  Proceed to  ER if symptoms worsen  Chief Complaint     Chief Complaint   Patient presents with    Sore Throat     Patient presents with a sore throat and ear pain that started last night  She also reports fatigue and chills  She is unsure if she was exposed to covid  History of Present Illness        68-year-old female here today with complaining nasal congestion, severe sore throat sinus pressure since last night  Complaining of sneezing, watery itchy eyes, denies cough shortness of breath  Denies fever however complaining of chills  Denies loss of smell or loss of taste  Denies nausea, vomiting, diarrhea  She has a known history of seasonal allergies but does not take medication on a regular basis  Denies any known contact with anyone testing positive for COVID-19      Review of Systems   Review of Systems   Constitutional: Positive for chills  HENT: Positive for ear pain, sinus pain, sneezing and sore throat  Respiratory: Negative  Gastrointestinal: Negative  Neurological: Positive for headaches           Current Medications       Current Outpatient Medications:     valACYclovir (VALTREX) 500 mg tablet, valacyclovir 500 mg tablet, Disp: , Rfl:     albuterol (PROVENTIL HFA,VENTOLIN HFA) 90 mcg/act inhaler, albuterol sulfate HFA 90 mcg/actuation aerosol inhaler, Disp: , Rfl:     Balcoltra 0 1-20 MG-MCG(21) TABS, , Disp: , Rfl:     fluticasone (FLONASE) 50 mcg/act nasal spray, 2 sprays into each nostril daily, Disp: 16 g, Rfl: 0    Levonorgestrel (LILETTA, 52 MG,) 19 5 MCG/DAY IUD, Liletta 19 5 mcg/24 hrs (5 yrs) 52 mg intrauterine device  Take 1 device by intrauterine route , Disp: , Rfl:     Lidocaine Viscous HCl (XYLOCAINE) 2 % mucosal solution, Swish and spit 15 mL 4 (four) times a day as needed for mouth pain or discomfort, Disp: 100 mL, Rfl: 0    Current Allergies     Allergies as of 03/28/2021 - Reviewed 03/28/2021   Allergen Reaction Noted    Other Allergic Rhinitis 10/23/2017            The following portions of the patient's history were reviewed and updated as appropriate: allergies, current medications, past family history, past medical history, past social history, past surgical history and problem list      History reviewed  No pertinent past medical history  Past Surgical History:   Procedure Laterality Date    NO PAST SURGERIES         Family History   Problem Relation Age of Onset    Aneurysm Father         cardiac    Heart disease Father         problem    Leukemia Brother         chronic lymphoid    Aneurysm Paternal Grandfather         cardiac     Throat cancer Family     Alcohol abuse Neg Hx     Substance Abuse Neg Hx     Mental illness Neg Hx          Medications have been verified  Objective   Pulse 82   Temp 98 1 °F (36 7 °C)   Resp 15   LMP 03/01/2021   SpO2 100%   Patient's last menstrual period was 03/01/2021  Physical Exam     Physical Exam  Constitutional:       Appearance: She is well-developed  HENT:      Right Ear: Tympanic membrane normal       Left Ear: Tympanic membrane normal       Nose:      Comments: Hypertrophic boggy turbinates left greater than right  Mouth/Throat: Tonsils: 0 on the right  0 on the left  Comments: Cobblestoning observed the posterior pharynx  No exudates visualized  Neck:      Musculoskeletal: Normal range of motion  Cardiovascular:      Rate and Rhythm: Normal rate and regular rhythm  Pulmonary:      Effort: Pulmonary effort is normal       Breath sounds: Normal breath sounds  Lymphadenopathy:      Cervical: Cervical adenopathy present  Neurological:      Mental Status: She is alert

## 2021-03-31 LAB — BACTERIA THROAT CULT: NORMAL

## 2021-04-01 ENCOUNTER — APPOINTMENT (OUTPATIENT)
Dept: RADIOLOGY | Facility: MEDICAL CENTER | Age: 26
End: 2021-04-01
Payer: COMMERCIAL

## 2021-04-01 ENCOUNTER — OFFICE VISIT (OUTPATIENT)
Dept: URGENT CARE | Facility: MEDICAL CENTER | Age: 26
End: 2021-04-01
Payer: COMMERCIAL

## 2021-04-01 ENCOUNTER — TELEPHONE (OUTPATIENT)
Dept: URGENT CARE | Facility: MEDICAL CENTER | Age: 26
End: 2021-04-01

## 2021-04-01 VITALS — OXYGEN SATURATION: 97 % | TEMPERATURE: 98.8 F | RESPIRATION RATE: 20 BRPM | HEART RATE: 90 BPM

## 2021-04-01 DIAGNOSIS — J06.9 ACUTE URI: Primary | ICD-10-CM

## 2021-04-01 DIAGNOSIS — J06.9 ACUTE URI: ICD-10-CM

## 2021-04-01 PROCEDURE — 71046 X-RAY EXAM CHEST 2 VIEWS: CPT

## 2021-04-01 PROCEDURE — S9088 SERVICES PROVIDED IN URGENT: HCPCS | Performed by: PHYSICIAN ASSISTANT

## 2021-04-01 PROCEDURE — 99213 OFFICE O/P EST LOW 20 MIN: CPT | Performed by: PHYSICIAN ASSISTANT

## 2021-04-01 RX ORDER — CETIRIZINE HYDROCHLORIDE 10 MG/1
10 TABLET ORAL DAILY
Qty: 30 TABLET | Refills: 0 | Status: SHIPPED | OUTPATIENT
Start: 2021-04-01 | End: 2021-04-25 | Stop reason: SDUPTHER

## 2021-04-01 RX ORDER — FLUTICASONE PROPIONATE 50 MCG
2 SPRAY, SUSPENSION (ML) NASAL DAILY
Qty: 16 G | Refills: 0 | Status: SHIPPED | OUTPATIENT
Start: 2021-04-01 | End: 2021-05-11 | Stop reason: SDUPTHER

## 2021-04-01 RX ORDER — ALBUTEROL SULFATE 90 UG/1
2 AEROSOL, METERED RESPIRATORY (INHALATION) EVERY 6 HOURS PRN
Qty: 1 INHALER | Refills: 0 | Status: SHIPPED | OUTPATIENT
Start: 2021-04-01 | End: 2022-04-25

## 2021-04-01 RX ORDER — BENZONATATE 200 MG/1
200 CAPSULE ORAL 3 TIMES DAILY PRN
Qty: 20 CAPSULE | Refills: 0 | Status: SHIPPED | OUTPATIENT
Start: 2021-04-01 | End: 2021-05-11

## 2021-04-02 NOTE — PROGRESS NOTES
[facility]        NAME: Norah Mcdaniels is a 30 y o  female  : 1995    MRN: [id number]  DATE: 2021  TIME: 8:26 PM    Assessment and Plan   Acute URI [J06 9]  1  Acute URI  XR chest pa & lateral    fluticasone (FLONASE) 50 mcg/act nasal spray    benzonatate (TESSALON) 200 MG capsule    albuterol (PROVENTIL HFA,VENTOLIN HFA) 90 mcg/act inhaler    cetirizine (ZyrTEC) 10 mg tablet         Patient Instructions      use medications as directed for symptomatic relief   continue Advil cold and Sinus as needed   Tylenol as needed for additional pain or fever control   drink plenty of fluids and stay well hydrated  Follow up with PCP in 3-5 days  Proceed to  ER if symptoms worsen  Chief Complaint     Chief Complaint   Patient presents with    Cold Like Symptoms     here saturday for cold  strep was negative, covid was negative  states still coughing and not better         History of Present Illness        40-year-old female presents with cough and chest congestion that has started now  Patient reports she was here on Saturday with some cold symptoms runny nose congestion headaches sinus pressure  Has been taking Advil cold and Sinus which has seemed to improve and now she is having lot of drainage into her chest and having lot of coughing congestion early in the morning worse she is producing a lot of sputum  Feels little tight and wheezy from time to time but typically is able to bring it up with coughing  Denies any fevers or chills  No sore throat currently  No diarrhea nausea vomiting  Cough  This is a new problem  The current episode started in the past 7 days  The problem has been waxing and waning  The problem occurs hourly  The cough is productive of sputum  Associated symptoms include nasal congestion, rhinorrhea and wheezing  Pertinent negatives include no chills, ear congestion, ear pain, fever, myalgias, shortness of breath or weight loss   Nothing aggravates the symptoms  Treatments tried: Advil cold and sinus  The treatment provided moderate relief  There is no history of asthma  Review of Systems   Review of Systems   Constitutional: Negative  Negative for chills, fever and weight loss  HENT: Positive for rhinorrhea  Negative for ear pain  Eyes: Negative  Respiratory: Positive for cough and wheezing  Negative for shortness of breath  Cardiovascular: Negative  Gastrointestinal: Negative  Musculoskeletal: Negative  Negative for myalgias  Skin: Negative  Neurological: Negative            Current Medications       Current Outpatient Medications:     albuterol (PROVENTIL HFA,VENTOLIN HFA) 90 mcg/act inhaler, albuterol sulfate HFA 90 mcg/actuation aerosol inhaler, Disp: , Rfl:     albuterol (PROVENTIL HFA,VENTOLIN HFA) 90 mcg/act inhaler, Inhale 2 puffs every 6 (six) hours as needed for wheezing, Disp: 1 Inhaler, Rfl: 0    Balcoltra 0 1-20 MG-MCG(21) TABS, , Disp: , Rfl:     benzonatate (TESSALON) 200 MG capsule, Take 1 capsule (200 mg total) by mouth 3 (three) times a day as needed for cough, Disp: 20 capsule, Rfl: 0    cetirizine (ZyrTEC) 10 mg tablet, Take 1 tablet (10 mg total) by mouth daily, Disp: 30 tablet, Rfl: 0    fluticasone (FLONASE) 50 mcg/act nasal spray, 2 sprays into each nostril daily, Disp: 16 g, Rfl: 0    fluticasone (FLONASE) 50 mcg/act nasal spray, 2 sprays into each nostril daily, Disp: 16 g, Rfl: 0    Levonorgestrel (LILETTA, 52 MG,) 19 5 MCG/DAY IUD, Liletta 19 5 mcg/24 hrs (5 yrs) 52 mg intrauterine device  Take 1 device by intrauterine route , Disp: , Rfl:     Lidocaine Viscous HCl (XYLOCAINE) 2 % mucosal solution, Swish and spit 15 mL 4 (four) times a day as needed for mouth pain or discomfort, Disp: 100 mL, Rfl: 0    valACYclovir (VALTREX) 500 mg tablet, valacyclovir 500 mg tablet, Disp: , Rfl:     Current Allergies     Allergies as of 04/01/2021 - Reviewed 04/01/2021   Allergen Reaction Noted    Other Allergic Rhinitis 10/23/2017            The following portions of the patient's history were reviewed and updated as appropriate: allergies, current medications, past family history, past medical history, past social history, past surgical history and problem list      History reviewed  No pertinent past medical history  Past Surgical History:   Procedure Laterality Date    NO PAST SURGERIES         Family History   Problem Relation Age of Onset    Aneurysm Father         cardiac    Heart disease Father         problem    Leukemia Brother         chronic lymphoid    Aneurysm Paternal Grandfather         cardiac     Throat cancer Family     Alcohol abuse Neg Hx     Substance Abuse Neg Hx     Mental illness Neg Hx          Medications have been verified  Objective   Pulse 90   Temp 98 8 °F (37 1 °C)   Resp 20   SpO2 97%   No LMP recorded  Physical Exam     Physical Exam  Vitals signs and nursing note reviewed  Constitutional:       General: She is not in acute distress  Appearance: She is well-developed  HENT:      Head: Normocephalic and atraumatic  Right Ear: Hearing, tympanic membrane, ear canal and external ear normal       Left Ear: Hearing, tympanic membrane, ear canal and external ear normal       Nose: Nose normal       Mouth/Throat:      Pharynx: Uvula midline  No oropharyngeal exudate  Eyes:      General:         Right eye: No discharge  Left eye: No discharge  Conjunctiva/sclera: Conjunctivae normal    Neck:      Musculoskeletal: Normal range of motion and neck supple  Cardiovascular:      Rate and Rhythm: Normal rate and regular rhythm  Heart sounds: Normal heart sounds  No murmur  Pulmonary:      Effort: Pulmonary effort is normal  No respiratory distress  Breath sounds: Normal breath sounds  No wheezing or rales  Abdominal:      General: Bowel sounds are normal       Palpations: Abdomen is soft  Tenderness:  There is no abdominal tenderness  Musculoskeletal: Normal range of motion  Lymphadenopathy:      Cervical: No cervical adenopathy  Skin:     General: Skin is warm and dry  Neurological:      Mental Status: She is alert and oriented to person, place, and time  chest x-ray reviewed  No abnormalities noted    Pending radiologist interpretation

## 2021-04-02 NOTE — PATIENT INSTRUCTIONS
use medications as directed for symptomatic relief   continue Advil cold and Sinus as needed   Tylenol as needed for additional pain or fever control   drink plenty of fluids and stay well hydrated  Follow up with PCP in 3-5 days  Proceed to  ER if symptoms worsen  Cold Symptoms   AMBULATORY CARE:   Cold symptoms  include sneezing, dry throat, a stuffy nose, headache, watery eyes, and a cough  Your cough may be dry, or you may cough up mucus  You may also have muscle aches, joint pain, and tiredness  Rarely, you may have a fever  Cold symptoms occur from inflammation in your upper respiratory system caused by a virus  Most colds go away without treatment  Seek care immediately if:   · You have increased tiredness and weakness  · You are unable to eat  · Your heart is beating much faster than usual for you  · You see white spots in the back of your throat and your neck is swollen and sore to the touch  · You see pinpoint or larger reddish-purple dots on your skin  Contact your healthcare provider if:   · You have a fever higher than 102°F (38 9°C)  · You have new or worsening shortness of breath  · You have thick nasal drainage for more than 2 days  · Your symptoms do not improve or get worse within 5 days  · You have questions or concerns about your condition or care  Treatment for cold symptoms  may include NSAIDS to decrease muscle aches and fever  Cold medicines may also be given to decrease coughing, nasal stuffiness, sneezing, and a runny nose  Manage your cold symptoms: The following may help relieve cold symptoms, such as a dry throat and congestion:  · Gargle with mouthwash or warm salt water as directed  · Suck on throat lozenges or hard candy  · Use a cold or warm vaporizer or humidifier to ease your breathing  · Rest for at least 2 days and then as needed to decrease tiredness and weakness       · Use petroleum based jelly around your nostrils to decrease irritation from blowing your nose  · Drink plenty of liquids  Liquids will help thin and loosen thick mucus so you can cough it up  Liquids will also keep you hydrated  Ask your healthcare provider which liquids are best for you and how much to drink each day  Prevent the spread of germs  by washing your hands often  You can spread your cold germs to others for at least 3 days after your symptoms start  Do not share items, such as eating utensils  Cover your nose and mouth when you cough or sneeze using the crook of your elbow instead of your hands  Throw used tissues in the garbage  Do not smoke:  Smoking may worsen your symptoms and increase the length of time you feel sick  Talk with your healthcare provider if you need help to stop smoking  Follow up with your healthcare provider as directed:  Write down your questions so you remember to ask them during your visits  © Copyright [address] Information is for End User's use only and may not be sold, redistributed or otherwise used for commercial purposes  All illustrations and images included in CareNotes® are the copyrighted property of A D A M , Inc  or [facility] Lynne [place]   The above information is an  only  It is not intended as medical advice for individual conditions or treatments  Talk to your doctor, nurse or pharmacist before following any medical regimen to see if it is safe and effective for you

## 2021-04-23 DIAGNOSIS — J06.9 ACUTE URI: ICD-10-CM

## 2021-04-25 DIAGNOSIS — J06.9 ACUTE URI: ICD-10-CM

## 2021-04-25 RX ORDER — CETIRIZINE HYDROCHLORIDE 10 MG/1
TABLET ORAL
Qty: 30 TABLET | Refills: 0 | OUTPATIENT
Start: 2021-04-25

## 2021-04-25 RX ORDER — CETIRIZINE HYDROCHLORIDE 10 MG/1
10 TABLET ORAL DAILY
Qty: 30 TABLET | Refills: 3 | Status: SHIPPED | OUTPATIENT
Start: 2021-04-25 | End: 2021-05-11 | Stop reason: CLARIF

## 2021-05-11 ENCOUNTER — OFFICE VISIT (OUTPATIENT)
Dept: FAMILY MEDICINE CLINIC | Facility: CLINIC | Age: 26
End: 2021-05-11
Payer: COMMERCIAL

## 2021-05-11 VITALS
TEMPERATURE: 99 F | DIASTOLIC BLOOD PRESSURE: 78 MMHG | RESPIRATION RATE: 16 BRPM | HEART RATE: 80 BPM | BODY MASS INDEX: 24.44 KG/M2 | WEIGHT: 132.8 LBS | HEIGHT: 62 IN | SYSTOLIC BLOOD PRESSURE: 122 MMHG

## 2021-05-11 DIAGNOSIS — H60.502 ACUTE OTITIS EXTERNA OF LEFT EAR, UNSPECIFIED TYPE: Primary | ICD-10-CM

## 2021-05-11 PROBLEM — B00.9 HERPES SIMPLEX: Status: ACTIVE | Noted: 2019-01-16

## 2021-05-11 PROCEDURE — 1036F TOBACCO NON-USER: CPT | Performed by: NURSE PRACTITIONER

## 2021-05-11 PROCEDURE — 99212 OFFICE O/P EST SF 10 MIN: CPT | Performed by: NURSE PRACTITIONER

## 2021-05-11 PROCEDURE — 3008F BODY MASS INDEX DOCD: CPT | Performed by: NURSE PRACTITIONER

## 2021-05-11 PROCEDURE — 3725F SCREEN DEPRESSION PERFORMED: CPT | Performed by: NURSE PRACTITIONER

## 2021-05-11 RX ORDER — CIPROFLOXACIN AND DEXAMETHASONE 3; 1 MG/ML; MG/ML
4 SUSPENSION/ DROPS AURICULAR (OTIC) 2 TIMES DAILY
Qty: 7.5 ML | Refills: 0 | Status: SHIPPED | OUTPATIENT
Start: 2021-05-11 | End: 2022-06-09

## 2021-05-11 RX ORDER — LORATADINE 10 MG/1
10 TABLET ORAL AS NEEDED
COMMUNITY

## 2021-05-11 NOTE — PROGRESS NOTES
Assessment/Plan:     Diagnoses and all orders for this visit:    Acute otitis externa of left ear, unspecified type  -     ciprofloxacin-dexamethasone (CIPRODEX) otic suspension; Administer 4 drops into the left ear 2 (two) times a day for 7 days    Pt to start Ciprodex drops for a 7 day course  Pt is to avoid Qtip use  She is to keep external ears clean and dry & avoid submerging water  She is to call office if her symptoms persist or worsen  Patient states they understand and agree with treatment plan  Pt to f/u PRN       Subjective:      Patient ID: Ekta Guzman is a 27 y o  female  Pt presents with acute left ear pain that started today  She denies ear drainage, redness, fever, chills, NVD  She is compliant with her allergy medications Claritin & PRN Flonase  Pt denies postnasal drip, sinus congestion, cough, sore throat  The following portions of the patient's history were reviewed and updated as appropriate: allergies, current medications, past family history, past medical history, past social history, past surgical history and problem list     Review of Systems   Constitutional: Negative  Negative for chills, fatigue and fever  HENT: Positive for ear pain (left ear pain x 1 day)  Negative for congestion, hearing loss, postnasal drip, sinus pressure, sinus pain and sore throat  Eyes: Negative  Negative for pain and visual disturbance  Respiratory: Negative  Negative for cough and shortness of breath  Cardiovascular: Negative  Negative for chest pain and palpitations  Gastrointestinal: Negative  Negative for abdominal pain and vomiting  Genitourinary: Negative  Negative for dysuria and hematuria  Musculoskeletal: Negative  Negative for arthralgias and back pain  Skin: Negative for color change and rash  Allergic/Immunologic: Positive for environmental allergies  Neurological: Negative  Negative for dizziness, seizures, syncope and headaches  Psychiatric/Behavioral: Negative  All other systems reviewed and are negative  Objective:      /78 (BP Location: Left arm, Patient Position: Sitting, Cuff Size: Standard)   Pulse 80   Temp 99 °F (37 2 °C) (Oral)   Resp 16   Ht 5' 1 5" (1 562 m)   Wt 60 2 kg (132 lb 12 8 oz)   BMI 24 69 kg/m²          Physical Exam  Vitals signs reviewed  Constitutional:       General: She is not in acute distress  Appearance: Normal appearance  She is not ill-appearing  HENT:      Head: Normocephalic  Right Ear: Hearing, tympanic membrane and ear canal normal  No decreased hearing noted  No drainage or tenderness  No middle ear effusion  No mastoid tenderness  Tympanic membrane is not injected, scarred, perforated, erythematous, retracted or bulging  Left Ear: Hearing, tympanic membrane and ear canal normal  No decreased hearing noted  Tenderness (tragal & left ear tenderness) present  No drainage  No middle ear effusion  There is mastoid tenderness  Tympanic membrane is not injected, scarred, perforated, erythematous, retracted or bulging  Ears:      Comments: Slight swelling on left external auditory canal  Cardiovascular:      Rate and Rhythm: Normal rate and regular rhythm  Pulses: Normal pulses  Heart sounds: Normal heart sounds  Pulmonary:      Effort: Pulmonary effort is normal       Breath sounds: Normal breath sounds  Skin:     General: Skin is warm and dry  Capillary Refill: Capillary refill takes less than 2 seconds  Neurological:      General: No focal deficit present  Mental Status: She is alert and oriented to person, place, and time  Mental status is at baseline  Psychiatric:         Mood and Affect: Mood normal          Behavior: Behavior normal          Thought Content:  Thought content normal          Judgment: Judgment normal

## 2021-05-12 ENCOUNTER — TELEPHONE (OUTPATIENT)
Dept: FAMILY MEDICINE CLINIC | Facility: CLINIC | Age: 26
End: 2021-05-12

## 2021-05-12 ENCOUNTER — TELEPHONE (OUTPATIENT)
Dept: URGENT CARE | Facility: MEDICAL CENTER | Age: 26
End: 2021-05-12

## 2021-05-12 ENCOUNTER — OFFICE VISIT (OUTPATIENT)
Dept: URGENT CARE | Facility: MEDICAL CENTER | Age: 26
End: 2021-05-12
Payer: COMMERCIAL

## 2021-05-12 VITALS
OXYGEN SATURATION: 98 % | RESPIRATION RATE: 20 BRPM | HEIGHT: 61 IN | BODY MASS INDEX: 24.73 KG/M2 | DIASTOLIC BLOOD PRESSURE: 65 MMHG | SYSTOLIC BLOOD PRESSURE: 122 MMHG | HEART RATE: 82 BPM | TEMPERATURE: 99.5 F | WEIGHT: 131 LBS

## 2021-05-12 DIAGNOSIS — H60.92 OTITIS EXTERNA OF LEFT EAR, UNSPECIFIED CHRONICITY, UNSPECIFIED TYPE: Primary | ICD-10-CM

## 2021-05-12 PROCEDURE — 99213 OFFICE O/P EST LOW 20 MIN: CPT | Performed by: PHYSICIAN ASSISTANT

## 2021-05-12 PROCEDURE — S9088 SERVICES PROVIDED IN URGENT: HCPCS | Performed by: PHYSICIAN ASSISTANT

## 2021-05-12 NOTE — PATIENT INSTRUCTIONS
Patient was educated I recommend she continue ear drops prescribed by NP at her PCP office  Patient was educated if symptoms get worst go to ED  Patient was educated on OTC Tylenol for pain and zyrtec for allergies  Patient was told zyrtec can make you tired  Patient understood  Patient was given referral to ENT  Otitis Externa   WHAT YOU NEED TO KNOW:   Otitis externa, or swimmer's ear, is an infection in the outer ear canal  This canal goes from the outside of the ear to the eardrum  DISCHARGE INSTRUCTIONS:   Return to the emergency department if:   · You have severe ear pain  · You are suddenly unable to hear at all  · You have new swelling in your face, behind your ears, or in your neck  · You suddenly cannot move part of your face  · Your face suddenly feels numb  Contact your healthcare provider if:   · You have a fever  · Your signs and symptoms do not get better after 2 days of treatment  · Your signs and symptoms go away for a time, but then come back  · You have questions or concerns about your condition or care  Medicines:   · NSAIDs , such as ibuprofen, help decrease swelling, pain, and fever  This medicine is available with or without a doctor's order  NSAIDs can cause stomach bleeding or kidney problems in certain people  If you take blood thinner medicine, always ask if NSAIDs are safe for you  Always read the medicine label and follow directions  Do not give these medicines to children under 10months of age without direction from your child's healthcare provider  · Acetaminophen  decreases pain and fever  It is available without a doctor's order  Ask how much to take and how often to take it  Follow directions  Acetaminophen can cause liver damage if not taken correctly  · Ear drops  that contain an antibiotic may be given  The antibiotic helps treat a bacterial infection  You may also be given steroid medicine   The steroid helps decrease redness, swelling, and pain  · Take your medicine as directed  Contact your healthcare provider if you think your medicine is not helping or if you have side effects  Tell him or her if you are allergic to any medicine  Keep a list of the medicines, vitamins, and herbs you take  Include the amounts, and when and why you take them  Bring the list or the pill bottles to follow-up visits  Carry your medicine list with you in case of an emergency  Follow up with your healthcare provider as directed:  Write down your questions so you remember to ask them during your visits  How to use eardrops:   · Lie down on your side with your infected ear facing up  · Carefully drip the correct number of eardrops into your ear  Have another person help you if possible  · Gently move the outside part of your ear back and forth to help the medicine reach your ear canal      · Stay lying down in the same position (with your ear facing up) for 3 to 5 minutes  Prevent otitis externa:   · Do not put cotton swabs or foreign objects in your ears  · Wrap a clean moist washcloth around your finger, and use it to clean your outer ear and remove extra ear wax  · Use ear plugs when you swim  Dry your outer ears completely after you swim or bathe  © Copyright [address] Information is for End User's use only and may not be sold, redistributed or otherwise used for commercial purposes  All illustrations and images included in CareNotes® are the copyrighted property of [employer]  or Divya Thurman  The above information is an  only  It is not intended as medical advice for individual conditions or treatments  Talk to your doctor, nurse or pharmacist before following any medical regimen to see if it is safe and effective for you

## 2021-05-12 NOTE — TELEPHONE ENCOUNTER
Pt states that the bump on her ear is getting larger and is very painful  She was seen yesterday for this issue  She wanted to know if this is normal or if she should be concerned? Should she schedule a visit to come back here or possibly see an ENT specialists?

## 2021-05-13 NOTE — TELEPHONE ENCOUNTER
LMOM for patient  It appears from patient's chart that she went to an Urgent Care later on in the evening, in which the provider agreed with the diagnosis of otitis externa and recommended continuing ear drops prescribed during my office visit with the patient, as well as Tylenol or Ibuprofen PRN for pain  She was given referral to ENT as well during her Urgent Care visit  I encouraged her to please call the office if she has any additional questions or concerns

## 2021-05-13 NOTE — PROGRESS NOTES
[facility]        NAME: Mika Madrid is a 27 y o  female  : 1995    MRN: [id number]  DATE: May 12, 2021  TIME: 8:18 PM    Assessment and Plan   Otitis externa of left ear, unspecified chronicity, unspecified type [H60 92]  1  Otitis externa of left ear, unspecified chronicity, unspecified type  Ambulatory Referral to Otolaryngology         Patient Instructions       Patient was educated I recommend she continue ear drops prescribed by NP at her PCP office  Patient was educated if symptoms get worst go to ED  Patient was educated on OTC Tylenol for pain and zyrtec for allergies  Patient was told zyrtec can make  You tired  Patient understood  Patient was given referral to ENT  Chief Complaint     Chief Complaint   Patient presents with    Ear Problem     patient states she is currently being treated for ear infection  She now has a bump anteriorly to the ear on the L  Patient states it is painful         History of Present Illness       Patient presents with outer left ear pain  Patient was seen by NP at PCP office who diagnosed her with otitis externa and given ear drops  Patient reports the ear drops did help with some inflammation  Patient reports she is concerned about swelling and little round ball in front of left ear  Patient admits seasonal allergies  Denies loss of hearing or muffled hearing  Review of Systems   Review of Systems   Constitutional: Negative  HENT: Positive for ear pain and sinus pain  Negative for ear discharge and hearing loss  Respiratory: Negative  Cardiovascular: Negative  Neurological: Positive for headaches  Psychiatric/Behavioral: Negative            Current Medications       Current Outpatient Medications:     Balcoltra 0 1-20 MG-MCG(21) TABS, , Disp: , Rfl:     ciprofloxacin-dexamethasone (CIPRODEX) otic suspension, Administer 4 drops into the left ear 2 (two) times a day for 7 days, Disp: 7 5 mL, Rfl: 0    fluticasone (FLONASE) 50 mcg/act nasal spray, 2 sprays into each nostril daily, Disp: 16 g, Rfl: 0    loratadine (CLARITIN) 10 mg tablet, Take 10 mg by mouth daily, Disp: , Rfl:     valACYclovir (VALTREX) 500 mg tablet, valacyclovir 500 mg tablet, Disp: , Rfl:     albuterol (PROVENTIL HFA,VENTOLIN HFA) 90 mcg/act inhaler, Inhale 2 puffs every 6 (six) hours as needed for wheezing (Patient not taking: Reported on 5/12/2021), Disp: 1 Inhaler, Rfl: 0    Lidocaine Viscous HCl (XYLOCAINE) 2 % mucosal solution, Swish and spit 15 mL 4 (four) times a day as needed for mouth pain or discomfort (Patient not taking: Reported on 5/12/2021), Disp: 100 mL, Rfl: 0    Current Allergies     Allergies as of 05/12/2021 - Reviewed 05/12/2021   Allergen Reaction Noted    Other Allergic Rhinitis 10/23/2017            The following portions of the patient's history were reviewed and updated as appropriate: allergies, current medications, past family history, past medical history, past social history, past surgical history and problem list      Past Medical History:   Diagnosis Date    Seasonal allergies        Past Surgical History:   Procedure Laterality Date    NO PAST SURGERIES         Family History   Problem Relation Age of Onset    Aneurysm Father         cardiac    Heart disease Father         problem    Leukemia Brother         chronic lymphoid    Aneurysm Paternal Grandfather         cardiac     Throat cancer Family     Alcohol abuse Neg Hx     Substance Abuse Neg Hx     Mental illness Neg Hx          Medications have been verified  Objective   /65   Pulse 82   Temp 99 5 °F (37 5 °C)   Resp 20   Ht 5' 1" (1 549 m)   Wt 59 4 kg (131 lb)   LMP 05/06/2021   SpO2 98%   BMI 24 75 kg/m²   Patient's last menstrual period was 05/06/2021  Physical Exam     Physical Exam  Constitutional:       Appearance: She is normal weight  HENT:      Head: Normocephalic        Comments: No pain over frontal or maxillary sinus     Right Ear: Tympanic membrane, ear canal and external ear normal  There is no impacted cerumen  Left Ear: Tympanic membrane and ear canal normal  There is no impacted cerumen  Ears:      Comments: Redness noted in left external ear canal  Pain over left tragus and left posterior ear  No pain over right tragus and right posterior ear  Swelling noted on front of left ear when compared to right ear  No pain with opening and closing of jaw  Nose: Nose normal    Eyes:      Extraocular Movements: Extraocular movements intact  Pupils: Pupils are equal, round, and reactive to light  Neck:      Comments: No palpable lymph nodes  Neurological:      General: No focal deficit present  Mental Status: She is alert and oriented to person, place, and time     Psychiatric:         Mood and Affect: Mood normal          Behavior: Behavior normal

## 2021-07-12 ENCOUNTER — TELEMEDICINE (OUTPATIENT)
Dept: FAMILY MEDICINE CLINIC | Facility: CLINIC | Age: 26
End: 2021-07-12
Payer: COMMERCIAL

## 2021-07-12 VITALS — WEIGHT: 132 LBS | BODY MASS INDEX: 24.92 KG/M2 | HEIGHT: 61 IN

## 2021-07-12 DIAGNOSIS — J02.9 SORE THROAT: ICD-10-CM

## 2021-07-12 DIAGNOSIS — Z20.822 SUSPECTED COVID-19 VIRUS INFECTION: Primary | ICD-10-CM

## 2021-07-12 LAB — S PYO AG THROAT QL: NEGATIVE

## 2021-07-12 PROCEDURE — 1036F TOBACCO NON-USER: CPT | Performed by: NURSE PRACTITIONER

## 2021-07-12 PROCEDURE — U0005 INFEC AGEN DETEC AMPLI PROBE: HCPCS | Performed by: NURSE PRACTITIONER

## 2021-07-12 PROCEDURE — 87880 STREP A ASSAY W/OPTIC: CPT | Performed by: NURSE PRACTITIONER

## 2021-07-12 PROCEDURE — 3008F BODY MASS INDEX DOCD: CPT | Performed by: NURSE PRACTITIONER

## 2021-07-12 PROCEDURE — U0003 INFECTIOUS AGENT DETECTION BY NUCLEIC ACID (DNA OR RNA); SEVERE ACUTE RESPIRATORY SYNDROME CORONAVIRUS 2 (SARS-COV-2) (CORONAVIRUS DISEASE [COVID-19]), AMPLIFIED PROBE TECHNIQUE, MAKING USE OF HIGH THROUGHPUT TECHNOLOGIES AS DESCRIBED BY CMS-2020-01-R: HCPCS | Performed by: NURSE PRACTITIONER

## 2021-07-12 PROCEDURE — 87070 CULTURE OTHR SPECIMN AEROBIC: CPT | Performed by: NURSE PRACTITIONER

## 2021-07-12 PROCEDURE — 99213 OFFICE O/P EST LOW 20 MIN: CPT | Performed by: NURSE PRACTITIONER

## 2021-07-12 NOTE — PROGRESS NOTES
COVID-19 Outpatient Progress Note    Assessment/Plan:    Pt presents for symptoms of rhinorrhea, fatigue, headache, sore throat since last Thursday 07/08  She notes her son was sick last week prior to her feeling ill  She states she has not had her covid vaccine  Covid swab ordered  Patient encouraged to continue symptom management & maintain adequate oral fluid intake at home  Pt notified that our office will contact them once their results are final  In the meantime, pt is to self-isolate & quarantine at home until results final  Pt to call office if symptoms worsen  Pt states they understand & agree with treatment plan  Problem List Items Addressed This Visit     None      Visit Diagnoses     Suspected COVID-19 virus infection    -  Primary    Relevant Orders    Novel Coronavirus (COVID-19), PCR [facility] Collected in Office    Sore throat        Relevant Orders    POCT rapid strepA (Completed)    Throat culture         Disposition:     I recommended self-quarantine for 10 days and to watch for symptoms until 14 days after exposure  If patient were to develop symptoms, they should self isolate and call our office for further guidance  I referred patient to one of our centralized sites for a COVID-19 swab  I have spent 15 minutes directly with the patient  Greater than 50% of this time was spent in counseling/coordination of care regarding: instructions for management and patient and family education  Encounter provider YOLANDA Childers    Provider located at [address] [place] [address]  [phone]    Recent Visits  No visits were found meeting these conditions    Showing recent visits within past 7 days and meeting all other requirements  Today's Visits  Date Type Provider Dept   07/12/21 Telemedicine YOLANDA Childres [address]   Showing today's visits and meeting all other requirements  Future Appointments  No visits were found meeting these conditions  Showing future appointments within next 150 days and meeting all other requirements     This virtual check-in was done via [employer] and patient was informed that this is a secure, HIPAA-compliant platform  She agrees to proceed  Patient agrees to participate in a virtual check in via telephone or video visit instead of presenting to the office to address urgent/immediate medical needs  Patient is aware this is a billable service  After connecting through [facility], the patient was identified by name and date of birth  Caitlin Hoffmann was informed that this was a telemedicine visit and that the exam was being conducted confidentially over secure lines  My office door was closed  No one else was in the room  Caitlin Hoffmann acknowledged consent and understanding of privacy and security of the telemedicine visit  I informed the patient that I have reviewed her record in Epic and presented the opportunity for her to ask any questions regarding the visit today  The patient agreed to participate  Subjective: Caitlin Hoffmann is a 27 y o  female who is concerned about COVID-19  Patient's symptoms include fatigue, malaise, nasal congestion, rhinorrhea, sore throat, diarrhea and headache  Patient denies fever, chills, anosmia, loss of taste, cough, shortness of breath, chest tightness, abdominal pain, nausea, vomiting and myalgias       Date of symptom onset: 7/7/2021    Exposure:   Contact with a person who is under investigation (PUI) for or who is positive for COVID-19 within the last 14 days?: No    Hospitalized recently for fever and/or lower respiratory symptoms?: No      Currently a healthcare worker that is involved in direct patient care?: No      Works in a special setting where the risk of COVID-19 transmission may be high? (this may include long-term care, correctional and [facility] facilities; homeless shelters; assisted-living facilities and group homes ): No      Resident in a special setting where the risk of COVID-19 transmission may be high? (this may include long-term care, correctional and [facility] facilities; homeless shelters; assisted-living facilities and group homes ): No      Pt presents for symptoms of rhinorrhea, fatigue, headache, sore throat since last Thursday 07/08  She notes her son was sick last week prior to her feeling ill  She states she has not had her covid vaccine  Lab Results   Component Value Date    SARSCOV2 Negative 03/28/2021    Kenisha Mater Not Detected 04/04/2020     Past Medical History:   Diagnosis Date    Seasonal allergies      Past Surgical History:   Procedure Laterality Date    NO PAST SURGERIES       Current Outpatient Medications   Medication Sig Dispense Refill    Balcoltra 0 1-20 MG-MCG(21) TABS       fluticasone (FLONASE) 50 mcg/act nasal spray 2 sprays into each nostril daily 16 g 0    loratadine (CLARITIN) 10 mg tablet Take 10 mg by mouth daily      valACYclovir (VALTREX) 500 mg tablet valacyclovir 500 mg tablet      albuterol (PROVENTIL HFA,VENTOLIN HFA) 90 mcg/act inhaler Inhale 2 puffs every 6 (six) hours as needed for wheezing (Patient not taking: Reported on 7/12/2021) 1 Inhaler 0    ciprofloxacin-dexamethasone (CIPRODEX) otic suspension Administer 4 drops into the left ear 2 (two) times a day for 7 days 7 5 mL 0    Lidocaine Viscous HCl (XYLOCAINE) 2 % mucosal solution Swish and spit 15 mL 4 (four) times a day as needed for mouth pain or discomfort (Patient not taking: Reported on 5/12/2021) 100 mL 0     No current facility-administered medications for this visit  Allergies   Allergen Reactions    Other Allergic Rhinitis     Seasonal        Review of Systems   Constitutional: Positive for fatigue  Negative for chills and fever  HENT: Positive for congestion, rhinorrhea and sore throat  Respiratory: Negative    Negative for cough, chest tightness and shortness of breath  Cardiovascular: Negative  Negative for chest pain  Gastrointestinal: Positive for diarrhea  Negative for abdominal pain, nausea and vomiting  Musculoskeletal: Negative  Negative for myalgias  Neurological: Positive for headaches  Psychiatric/Behavioral: Negative  Objective:    Vitals:    07/12/21 1355   Weight: 59 9 kg (132 lb)   Height: 5' 1" (1 549 m)       Physical Exam  Constitutional:       General: She is not in acute distress  Appearance: Normal appearance  She is well-developed  She is not ill-appearing  Pulmonary:      Effort: Pulmonary effort is normal    Neurological:      General: No focal deficit present  Mental Status: She is alert and oriented to person, place, and time  Mental status is at baseline  Psychiatric:         Mood and Affect: Mood normal          Behavior: Behavior normal          Thought Content: Thought content normal          Judgment: Judgment normal        VIRTUAL VISIT DISCLAIMER    Tia Stovall acknowledges that she has consented to an online visit or consultation  She understands that the online visit is based solely on information provided by her, and that, in the absence of a face-to-face physical evaluation by the physician, the diagnosis she receives is both limited and provisional in terms of accuracy and completeness  This is not intended to replace a full medical face-to-face evaluation by the physician  Tia Stovall understands and accepts these terms

## 2021-07-13 ENCOUNTER — TELEPHONE (OUTPATIENT)
Dept: FAMILY MEDICINE CLINIC | Facility: CLINIC | Age: 26
End: 2021-07-13

## 2021-07-13 LAB — SARS-COV-2 RNA RESP QL NAA+PROBE: NEGATIVE

## 2021-07-13 NOTE — TELEPHONE ENCOUNTER
Patient called regarding the lab results on her COVID and strep  I gave her the negative results on both  She is wondering what the next step is  Does she need antibiotics? Or is it viral and she just has to wait it out

## 2021-07-13 NOTE — TELEPHONE ENCOUNTER
----- Message from [address] [place] [address] sent at 7/13/2021  4:35 PM EDT -----  Please let pt know her covid test is negative  We are still awaiting her final throat culture

## 2021-07-15 ENCOUNTER — TELEPHONE (OUTPATIENT)
Dept: FAMILY MEDICINE CLINIC | Facility: CLINIC | Age: 26
End: 2021-07-15

## 2021-07-15 LAB — BACTERIA THROAT CULT: NORMAL

## 2021-07-15 NOTE — TELEPHONE ENCOUNTER
----- Message from [address] [place] [address] sent at 7/14/2021 12:47 PM EDT -----  Please let patient know her throat culture is negative  Thanks!       Patient informed

## 2021-08-18 ENCOUNTER — APPOINTMENT (OUTPATIENT)
Dept: LAB | Facility: MEDICAL CENTER | Age: 26
End: 2021-08-18

## 2021-08-18 ENCOUNTER — OFFICE VISIT (OUTPATIENT)
Dept: URGENT CARE | Facility: MEDICAL CENTER | Age: 26
End: 2021-08-18
Payer: COMMERCIAL

## 2021-08-18 VITALS
TEMPERATURE: 97.9 F | HEART RATE: 88 BPM | SYSTOLIC BLOOD PRESSURE: 110 MMHG | DIASTOLIC BLOOD PRESSURE: 70 MMHG | RESPIRATION RATE: 18 BRPM | OXYGEN SATURATION: 99 %

## 2021-08-18 DIAGNOSIS — B34.9 VIRAL SYNDROME: Primary | ICD-10-CM

## 2021-08-18 DIAGNOSIS — Z20.822 ENCOUNTER FOR LABORATORY TESTING FOR COVID-19 VIRUS: ICD-10-CM

## 2021-08-18 LAB — SARS-COV-2 RNA RESP QL NAA+PROBE: NEGATIVE

## 2021-08-18 PROCEDURE — U0003 INFECTIOUS AGENT DETECTION BY NUCLEIC ACID (DNA OR RNA); SEVERE ACUTE RESPIRATORY SYNDROME CORONAVIRUS 2 (SARS-COV-2) (CORONAVIRUS DISEASE [COVID-19]), AMPLIFIED PROBE TECHNIQUE, MAKING USE OF HIGH THROUGHPUT TECHNOLOGIES AS DESCRIBED BY CMS-2020-01-R: HCPCS | Performed by: PHYSICIAN ASSISTANT

## 2021-08-18 PROCEDURE — 99213 OFFICE O/P EST LOW 20 MIN: CPT | Performed by: PHYSICIAN ASSISTANT

## 2021-08-18 PROCEDURE — S9088 SERVICES PROVIDED IN URGENT: HCPCS | Performed by: PHYSICIAN ASSISTANT

## 2021-08-18 PROCEDURE — U0005 INFEC AGEN DETEC AMPLI PROBE: HCPCS | Performed by: PHYSICIAN ASSISTANT

## 2021-08-18 NOTE — PATIENT INSTRUCTIONS
[address]    Your healthcare provider and/or public health staff have evaluated you and have determined that you do not need to remain in the hospital at this time  At this time you can be isolated at home where you will be monitored by staff from your local or state health department  You should carefully follow the prevention and isolation steps below until a healthcare provider or local or state health department says that you can return to your normal activities  Stay home except to get medical care    People who are mildly ill with COVID-19 are able to isolate at home during their illness  You should restrict activities outside your home, except for getting medical care  Do not go to work, school, or public areas  Avoid using public transportation, ride-sharing, or taxis  Separate yourself from other people and animals in your home    People: As much as possible, you should stay in a specific room and away from other people in your home  Also, you should use a separate bathroom, if available  Animals: You should restrict contact with pets and other animals while you are sick with COVID-19, just like you would around other people  Although there have not been reports of pets or other animals becoming sick with COVID-19, it is still recommended that people sick with COVID-19 limit contact with animals until more information is known about the virus  When possible, have another member of your household care for your animals while you are sick  If you are sick with COVID-19, avoid contact with your pet, including petting, snuggling, being kissed or licked, and sharing food  If you must care for your pet or be around animals while you are sick, wash your hands before and after you interact with pets and wear a facemask  See COVID-19 and Animals for more information      Call ahead before visiting your doctor    If you have a medical appointment, call the healthcare provider and tell them that you have or may have COVID-19  This will help the healthcare providers office take steps to keep other people from getting infected or exposed  Wear a facemask    You should wear a facemask when you are around other people (e g , sharing a room or vehicle) or pets and before you enter a healthcare providers office  If you are not able to wear a facemask (for example, because it causes trouble breathing), then people who live with you should not stay in the same room with you, or they should wear a facemask if they enter your room  Cover your coughs and sneezes    Cover your mouth and nose with a tissue when you cough or sneeze  Throw used tissues in a lined trash can  Immediately wash your hands with soap and water for at least 20 seconds or, if soap and water are not available, clean your hands with an alcohol-based hand  that contains at least 60% alcohol  Clean your hands often    Wash your hands often with soap and water for at least 20 seconds, especially after blowing your nose, coughing, or sneezing; going to the bathroom; and before eating or preparing food  If soap and water are not readily available, use an alcohol-based hand  with at least 60% alcohol, covering all surfaces of your hands and rubbing them together until they feel dry  Soap and water are the best option if hands are visibly dirty  Avoid touching your eyes, nose, and mouth with unwashed hands  Avoid sharing personal household items    You should not share dishes, drinking glasses, cups, eating utensils, towels, or bedding with other people or pets in your home  After using these items, they should be washed thoroughly with soap and water  Clean all high-touch surfaces everyday    High touch surfaces include counters, tabletops, doorknobs, bathroom fixtures, toilets, phones, keyboards, tablets, and bedside tables  Also, clean any surfaces that may have blood, stool, or body fluids on them   Use a household cleaning spray or wipe, according to the label instructions  Labels contain instructions for safe and effective use of the cleaning product including precautions you should take when applying the product, such as wearing gloves and making sure you have good ventilation during use of the product  Monitor your symptoms    Seek prompt medical attention if your illness is worsening (e g , difficulty breathing)  Before seeking care, call your healthcare provider and tell them that you have, or are being evaluated for, COVID-19  Put on a facemask before you enter the facility  These steps will help the healthcare providers office to keep other people in the office or waiting room from getting infected or exposed  Ask your healthcare provider to call the local or [facility] health department  Persons who are placed under active monitoring or facilitated self-monitoring should follow instructions provided by their local health department or occupational health professionals, as appropriate  If you have a medical emergency and need to call 911, notify the dispatch personnel that you have, or are being evaluated for COVID-19  If possible, put on a facemask before emergency medical services arrive      Discontinuing home isolation    Patients with confirmed COVID-19 should remain under home isolation precautions until the following conditions are met:   - They have had no fever for at least 24 hours (that is one full day of no fever without the use medicine that reduces fevers)  AND  - other symptoms have improved (for example, when their cough or shortness of breath have improved)  AND  - If had mild or moderate illness, at least 10 days have passed since their symptoms first appeared or if severe illness (needed oxygen) or immunosuppressed, at least 20 days have passed since symptoms first appeared  Patients with confirmed COVID-19 should also notify close contacts (including their workplace) and ask that they self-quarantine  Currently, close contact is defined as being within 6 feet for 15 minutes or more from the period 24 hours starting 48 hours before symptom onset to the time at which the patient went into isolation  Close contacts of patients diagnosed with COVID-19 should be instructed by the patient to self-quarantine for 14 days from the last time of their last contact with the patient  Source: [employer] Syndrome   WHAT YOU NEED TO KNOW:   Viral syndrome is a term used for symptoms of an infection caused by a virus  Viruses are spread easily from person to person through the air and on shared items  DISCHARGE INSTRUCTIONS:   Call your local emergency number (911 in the [address] [place] [address],[address]) or have someone else call if:   · You have a seizure  · You cannot be woken  · You have chest pain or trouble breathing  Return to the emergency department if:   · You have a stiff neck, a bad headache, and sensitivity to light  · You feel weak, dizzy, or confused  · You stop urinating or urinate a lot less than usual     · You cough up blood or thick yellow or green mucus  · You have severe abdominal pain or your abdomen is larger than usual     Call your doctor if:   · Your symptoms do not get better with treatment or get worse after 3 days  · You have a rash or ear pain  · You have burning when you urinate  · You have questions or concerns about your condition or care  Medicines: You may  need any of the following:  · Acetaminophen  decreases pain and fever  It is available without a doctor's order  Ask how much to take and how often to take it  Follow directions  Read the labels of all other medicines you are using to see if they also contain acetaminophen, or ask your doctor or pharmacist  Acetaminophen can cause liver damage if not taken correctly  Do not use more than 4 grams (4,000 milligrams) total of acetaminophen in one day  · NSAIDs , such as ibuprofen, help decrease swelling, pain, and fever  NSAIDs can cause stomach bleeding or kidney problems in certain people  If you take blood thinner medicine, always ask your healthcare provider if NSAIDs are safe for you  Always read the medicine label and follow directions  · Cold medicine  helps decrease swelling, control a cough, and relieve chest or nasal congestion  · Saline nasal spray  helps decrease nasal congestion  · Take your medicine as directed  Contact your healthcare provider if you think your medicine is not helping or if you have side effects  Tell him of her if you are allergic to any medicine  Keep a list of the medicines, vitamins, and herbs you take  Include the amounts, and when and why you take them  Bring the list or the pill bottles to follow-up visits  Carry your medicine list with you in case of an emergency  Manage your symptoms:   · Drink liquids as directed to prevent dehydration  Ask how much liquid to drink each day and which liquids are best for you  Ask if you should drink an oral rehydration solution (ORS)  An ORS has the right amounts of water, salts, and sugar you need to replace body fluids  This may help prevent dehydration caused by vomiting or diarrhea  Do not drink liquids with caffeine  Liquids with caffeine can make dehydration worse  · Get plenty of rest to help your body heal   Take naps throughout the day  Ask your healthcare provider when you can return to work and your normal activities  · Use a cool mist humidifier to help you breathe easier  Ask your healthcare provider how to use a cool mist humidifier  · Eat honey or use cough drops for a sore throat  Cough drops are available without a doctor's order  Follow directions for taking cough drops  · Do not smoke or be close to anyone who is smoking  Nicotine and other chemicals in cigarettes and cigars can cause lung damage  Smoking can also delay healing   Ask your healthcare provider for information if you currently smoke and need help to quit  E-cigarettes or smokeless tobacco still contain nicotine  Talk to your healthcare provider before you use these products  Prevent the spread of germs:       · Wash your hands often  Wash your hands several times each day  Wash after you use the bathroom, change a child's diaper, and before you prepare or eat food  Use soap and water every time  Rub your soapy hands together, lacing your fingers  Wash the front and back of your hands, and in between your fingers  Use the fingers of one hand to scrub under the fingernails of the other hand  Wash for at least 20 seconds  Rinse with warm, running water for several seconds  Then dry your hands with a clean towel or paper towel  Use hand  that contains alcohol if soap and water are not available  Do not touch your eyes, nose, or mouth without washing your hands first          · Cover a sneeze or cough  Use a tissue that covers your mouth and nose  Throw the tissue away in a trash can right away  Use the bend of your arm if a tissue is not available  Wash your hands well with soap and water or use a hand   · Stay away from others while you are sick  Avoid crowds as much as possible  · Ask about vaccines you may need  Talk to your healthcare provider about your vaccine history  He or she will tell you which vaccines you need, and when to get them  ? Get the influenza (flu) vaccine as soon as recommended each year  The flu vaccine is available starting in September or October  Flu viruses change, so it is important to get a flu vaccine every year  ? Get the pneumonia vaccine if recommended  This vaccine is usually recommended every 5 years  Your provider will tell you when to get this vaccine, if needed  Follow up with your doctor as directed:  Write down your questions so you remember to ask them during your visits    © [address] Information is for End User's use only and may not be sold, redistributed or otherwise used for commercial purposes  All illustrations and images included in CareNotes® are the copyrighted property of A D A M , Inc  or Divya Thurman  The above information is an  only  It is not intended as medical advice for individual conditions or treatments  Talk to your doctor, nurse or pharmacist before following any medical regimen to see if it is safe and effective for you

## 2021-08-18 NOTE — PROGRESS NOTES
[facility]        NAME: Riana Leroy is a 27 y o  female  : 1995    MRN: [id number]  DATE: 2021  TIME: 10:57 AM    /70   Pulse 88   Temp 97 9 °F (36 6 °C) (Tympanic)   Resp 18   LMP 2021   SpO2 99%     Assessment and Plan   Viral syndrome [B34 9]  1  Viral syndrome  [facility]Cov[facility]   2  Encounter for laboratory testing for COVID-19 virus           Patient Instructions       Follow up with PCP in 3-5 days  Proceed to  ER if symptoms worsen  Chief Complaint     Chief Complaint   Patient presents with    Generalized Body Aches     Patient relates started with bodyaches, fatigue, diarrhea since Monday  "I was at the [address] [place] [address] in [place] and ate [place]walk food not sure if it is food poisoning " No vomiting  No fever  C/O sinus headache yesterday  Denies fever, chest pain and SOB  History of Present Illness       Pt with several days of fatigue and diarrhea and body aches,   Pt is not covid vaccinated, pt in area with high covid rate       Review of Systems   Review of Systems   Constitutional: Positive for fatigue  HENT: Negative  Eyes: Negative  Respiratory: Negative  Cardiovascular: Negative  Gastrointestinal: Positive for diarrhea  Endocrine: Negative  Genitourinary: Negative  Musculoskeletal: Positive for myalgias  Skin: Negative  Allergic/Immunologic: Negative  Neurological: Negative  Hematological: Negative  Psychiatric/Behavioral: Negative  All other systems reviewed and are negative          Current Medications       Current Outpatient Medications:     Balcoltra 0 1-20 MG-MCG(21) TABS, , Disp: , Rfl:     fluticasone (FLONASE) 50 mcg/act nasal spray, 2 sprays into each nostril daily, Disp: 16 g, Rfl: 0    valACYclovir (VALTREX) 500 mg tablet, valacyclovir 500 mg tablet, Disp: , Rfl:     albuterol (PROVENTIL HFA,VENTOLIN HFA) 90 mcg/act inhaler, Inhale 2 puffs every 6 (six) hours as needed for wheezing (Patient not taking: Reported on 7/12/2021), Disp: 1 Inhaler, Rfl: 0    ciprofloxacin-dexamethasone (CIPRODEX) otic suspension, Administer 4 drops into the left ear 2 (two) times a day for 7 days, Disp: 7 5 mL, Rfl: 0    Lidocaine Viscous HCl (XYLOCAINE) 2 % mucosal solution, Swish and spit 15 mL 4 (four) times a day as needed for mouth pain or discomfort (Patient not taking: Reported on 5/12/2021), Disp: 100 mL, Rfl: 0    loratadine (CLARITIN) 10 mg tablet, Take 10 mg by mouth daily (Patient not taking: Reported on 8/18/2021), Disp: , Rfl:     Current Allergies     Allergies as of 08/18/2021 - Reviewed 08/18/2021   Allergen Reaction Noted    Other Allergic Rhinitis 10/23/2017            The following portions of the patient's history were reviewed and updated as appropriate: allergies, current medications, past family history, past medical history, past social history, past surgical history and problem list      Past Medical History:   Diagnosis Date    Seasonal allergies        Past Surgical History:   Procedure Laterality Date    NO PAST SURGERIES         Family History   Problem Relation Age of Onset    Aneurysm Father         cardiac    Heart disease Father         problem    Leukemia Brother         chronic lymphoid    Aneurysm Paternal Grandfather         cardiac     Throat cancer Family     Alcohol abuse Neg Hx     Substance Abuse Neg Hx     Mental illness Neg Hx          Medications have been verified  Objective   /70   Pulse 88   Temp 97 9 °F (36 6 °C) (Tympanic)   Resp 18   LMP 08/11/2021   SpO2 99%        Physical Exam     Physical Exam  Vitals and nursing note reviewed  Constitutional:       Appearance: Normal appearance  She is normal weight  HENT:      Head: Normocephalic and atraumatic        Right Ear: Tympanic membrane, ear canal and external ear normal       Left Ear: Tympanic membrane, ear canal and external ear normal       Nose: Nose normal       Mouth/Throat:      Mouth: Mucous membranes are moist       Pharynx: Oropharynx is clear  Eyes:      Conjunctiva/sclera: Conjunctivae normal       Pupils: Pupils are equal, round, and reactive to light  Cardiovascular:      Rate and Rhythm: Normal rate and regular rhythm  Pulses: Normal pulses  Heart sounds: Normal heart sounds  Pulmonary:      Effort: Pulmonary effort is normal       Breath sounds: Normal breath sounds  Abdominal:      General: Abdomen is flat  Bowel sounds are normal       Palpations: Abdomen is soft  Musculoskeletal:         General: Normal range of motion  Cervical back: Normal range of motion and neck supple  Skin:     General: Skin is warm  Capillary Refill: Capillary refill takes less than 2 seconds  Neurological:      General: No focal deficit present  Mental Status: She is alert and oriented to person, place, and time     Psychiatric:         Mood and Affect: Mood normal          Behavior: Behavior normal

## 2021-11-07 ENCOUNTER — APPOINTMENT (OUTPATIENT)
Dept: RADIOLOGY | Facility: MEDICAL CENTER | Age: 26
End: 2021-11-07
Payer: COMMERCIAL

## 2021-11-07 ENCOUNTER — OFFICE VISIT (OUTPATIENT)
Dept: URGENT CARE | Facility: MEDICAL CENTER | Age: 26
End: 2021-11-07
Payer: COMMERCIAL

## 2021-11-07 VITALS — OXYGEN SATURATION: 98 % | TEMPERATURE: 98.7 F | RESPIRATION RATE: 20 BRPM | HEART RATE: 97 BPM

## 2021-11-07 DIAGNOSIS — R06.00 DYSPNEA, UNSPECIFIED TYPE: ICD-10-CM

## 2021-11-07 DIAGNOSIS — R06.00 DYSPNEA, UNSPECIFIED TYPE: Primary | ICD-10-CM

## 2021-11-07 PROCEDURE — 99213 OFFICE O/P EST LOW 20 MIN: CPT | Performed by: FAMILY MEDICINE

## 2021-11-07 PROCEDURE — S9088 SERVICES PROVIDED IN URGENT: HCPCS | Performed by: FAMILY MEDICINE

## 2021-11-07 PROCEDURE — 71046 X-RAY EXAM CHEST 2 VIEWS: CPT

## 2021-11-07 RX ORDER — ALBUTEROL SULFATE 2.5 MG/3ML
2.5 SOLUTION RESPIRATORY (INHALATION) EVERY 6 HOURS PRN
Qty: 3 ML | Refills: 0 | Status: SHIPPED | OUTPATIENT
Start: 2021-11-07 | End: 2021-11-07 | Stop reason: SDUPTHER

## 2021-11-07 RX ORDER — ALBUTEROL SULFATE 2.5 MG/3ML
2.5 SOLUTION RESPIRATORY (INHALATION) EVERY 6 HOURS PRN
Qty: 3 ML | Refills: 0 | Status: SHIPPED | OUTPATIENT
Start: 2021-11-07 | End: 2021-12-07

## 2022-02-16 ENCOUNTER — OFFICE VISIT (OUTPATIENT)
Dept: URGENT CARE | Facility: MEDICAL CENTER | Age: 27
End: 2022-02-16
Payer: COMMERCIAL

## 2022-02-16 VITALS
WEIGHT: 138 LBS | RESPIRATION RATE: 16 BRPM | BODY MASS INDEX: 26.06 KG/M2 | HEART RATE: 95 BPM | TEMPERATURE: 98.4 F | HEIGHT: 61 IN | OXYGEN SATURATION: 100 %

## 2022-02-16 DIAGNOSIS — Z20.822 CONTACT WITH AND (SUSPECTED) EXPOSURE TO COVID-19: Primary | ICD-10-CM

## 2022-02-16 PROCEDURE — 87636 SARSCOV2 & INF A&B AMP PRB: CPT | Performed by: PHYSICIAN ASSISTANT

## 2022-02-16 PROCEDURE — S9088 SERVICES PROVIDED IN URGENT: HCPCS | Performed by: PHYSICIAN ASSISTANT

## 2022-02-16 PROCEDURE — 99213 OFFICE O/P EST LOW 20 MIN: CPT | Performed by: PHYSICIAN ASSISTANT

## 2022-02-17 LAB
FLUAV RNA RESP QL NAA+PROBE: NEGATIVE
FLUBV RNA RESP QL NAA+PROBE: NEGATIVE
SARS-COV-2 RNA RESP QL NAA+PROBE: NEGATIVE

## 2022-02-17 NOTE — PROGRESS NOTES
[facility]        NAME: Nette Suazo is a 27 y o  female  : 1995    MRN: [id number]  DATE: 2022  TIME: 7:49 PM    Assessment and Plan   Contact with and (suspected) exposure to covid-19 [Z20 822]  1  Contact with and (suspected) exposure to covid-19  Cov/Flu-Collected at [facility] or [facility]       COVID and flu testing completed  Patient Instructions       Patient was educated on [place] [facility]  Patient was educated to stay quarantine until results are back  Patient was educated on hydration  Patient was told to go to ED if she begins to have chest pain, shortness of breath or high grade fevers  Patient was told if she continues to have sinus pressure follow up with PCP  Patient understood  Chief Complaint     Chief Complaint   Patient presents with    Cold Like Symptoms     sore throat and body aches x 3 days  not flu or covid vaccinated  History of Present Illness       Patient is here today complaining of sore throat, sinus pressure and muscle aches for 3 days  Patient is not vaccinated for COVID 19  Patient also reports she did have a headache and neck tension  Review of Systems   Review of Systems   Constitutional: Negative  HENT: Positive for sinus pressure, sinus pain and sore throat  Respiratory: Negative  Cardiovascular: Negative  Neurological: Positive for headaches           Current Medications       Current Outpatient Medications:     Balcoltra 0 1-20 MG-MCG(21) TABS, , Disp: , Rfl:     fluticasone (FLONASE) 50 mcg/act nasal spray, 2 sprays into each nostril daily, Disp: 16 g, Rfl: 0    loratadine (CLARITIN) 10 mg tablet, Take 10 mg by mouth daily  , Disp: , Rfl:     valACYclovir (VALTREX) 500 mg tablet, valacyclovir 500 mg tablet, Disp: , Rfl:     albuterol (PROVENTIL HFA,VENTOLIN HFA) 90 mcg/act inhaler, Inhale 2 puffs every 6 (six) hours as needed for wheezing (Patient not taking: Reported on 2022 ), Disp: 1 Inhaler, Rfl: 0    ciprofloxacin-dexamethasone (CIPRODEX) otic suspension, Administer 4 drops into the left ear 2 (two) times a day for 7 days, Disp: 7 5 mL, Rfl: 0    Lidocaine Viscous HCl (XYLOCAINE) 2 % mucosal solution, Swish and spit 15 mL 4 (four) times a day as needed for mouth pain or discomfort (Patient not taking: Reported on 5/12/2021), Disp: 100 mL, Rfl: 0    Current Allergies     Allergies as of 02/16/2022 - Reviewed 02/16/2022   Allergen Reaction Noted    Other Allergic Rhinitis 10/23/2017            The following portions of the patient's history were reviewed and updated as appropriate: allergies, current medications, past family history, past medical history, past social history, past surgical history and problem list      Past Medical History:   Diagnosis Date    Seasonal allergies        Past Surgical History:   Procedure Laterality Date    NO PAST SURGERIES         Family History   Problem Relation Age of Onset    Aneurysm Father         cardiac    Heart disease Father         problem    Leukemia Brother         chronic lymphoid    Aneurysm Paternal Grandfather         cardiac     Throat cancer Family     Alcohol abuse Neg Hx     Substance Abuse Neg Hx     Mental illness Neg Hx          Medications have been verified  Objective   Pulse 95   Temp 98 4 °F (36 9 °C)   Resp 16   Ht 5' 1" (1 549 m)   Wt 62 6 kg (138 lb)   LMP 01/15/2022   SpO2 100%   BMI 26 07 kg/m²   Patient's last menstrual period was 01/15/2022  Physical Exam     Physical Exam  Constitutional:       Appearance: She is normal weight  HENT:      Head: Normocephalic  Comments: No pain over frontal or maxillary sinus     Right Ear: Tympanic membrane, ear canal and external ear normal       Left Ear: Tympanic membrane, ear canal and external ear normal       Nose: Nose normal       Mouth/Throat:      Mouth: Mucous membranes are moist       Pharynx: No oropharyngeal exudate or posterior oropharyngeal erythema  Eyes:      Extraocular Movements: Extraocular movements intact  Pupils: Pupils are equal, round, and reactive to light  Neck:      Comments: No palpable lymph nodes  Cardiovascular:      Rate and Rhythm: Normal rate and regular rhythm  Heart sounds: Normal heart sounds  Pulmonary:      Breath sounds: No wheezing  Neurological:      General: No focal deficit present  Mental Status: She is alert and oriented to person, place, and time     Psychiatric:         Mood and Affect: Mood normal          Behavior: Behavior normal

## 2022-02-17 NOTE — PATIENT INSTRUCTIONS
Patient was educated on [place] [address]  Patient was educated to stay quarantine until results are back  Patient was educated on hydration  Patient was told to go to ED if she begins to have chest pain, shortness of breath or high grade fevers  Patient was told if she continues to have sinus pressure follow up with PCP  Patient understood  COVID-19 (Coronavirus Disease 2019)   WHAT YOU NEED TO KNOW:   Coronavirus disease 2019 (COVID-19) is the disease caused by a coronavirus first discovered in December 2019  Coronaviruses generally cause upper respiratory (nose, throat, and lung) infections, such as a cold  The new virus spreads quickly and easily  The virus can be spread starting 2 days before symptoms even begin  The virus has also changed into several new forms (called variants) since it was discovered  The variants may be more contagious (easily spread) than the original form  Some may also cause more severe illness than others  It is important to follow local, national, and worldwide measures to protect yourself and others from infection  DISCHARGE INSTRUCTIONS:   If you think you or someone you know may be infected:  Do the following to protect others:  · If emergency care is needed,  tell the  about the possible infection, or call ahead and tell the emergency department  · Call a healthcare provider  for instructions if symptoms are mild  Anyone who may be infected should not  arrive without calling first  The provider will need to protect staff members and other patients  · The person who may be infected needs to wear a face covering  while getting medical care  This will help lower the risk of infecting others  Coverings are not used for anyone who is younger than 2 years, has breathing problems, or cannot remove it  The provider can give you instructions for anyone who cannot wear a covering      Call your local emergency number [phone] in the [address] [place] [address],[address]) or an emergency department if:   · You have trouble breathing or shortness of breath at rest     · You have chest pain or pressure that lasts longer than 5 minutes  · You become confused or hard to wake  · Your lips or face are blue  · You have a fever of 104°F (40°C) or higher  Call your doctor if:   · You do not  have symptoms of COVID-19 but had close physical contact within 14 days with someone who tested positive  · You have questions or concerns about your condition or care  Medicines: You may need any of the following for mild symptoms:  · Decongestants  help reduce nasal congestion and help you breathe more easily  If you take decongestant pills, they may make you feel restless or cause problems with your sleep  Do not use decongestant sprays for more than a few days  · Cough suppressants  help reduce coughing  Ask your healthcare provider which type of cough medicine is best for you  · Acetaminophen  decreases pain and fever  It is available without a doctor's order  Ask how much to take and how often to take it  Follow directions  Read the labels of all other medicines you are using to see if they also contain acetaminophen, or ask your doctor or pharmacist  Acetaminophen can cause liver damage if not taken correctly  Do not use more than 4 grams (4,000 milligrams) total of acetaminophen in one day  · NSAIDs , such as ibuprofen, help decrease swelling, pain, and fever  NSAIDs can cause stomach bleeding or kidney problems in certain people  If you take blood thinner medicine, always ask your healthcare provider if NSAIDs are safe for you  Always read the medicine label and follow directions  · Take your medicine as directed  Contact your healthcare provider if you think your medicine is not helping or if you have side effects  Tell him or her if you are allergic to any medicine  Keep a list of the medicines, vitamins, and herbs you take  Include the amounts, and when and why you take them   Bring the list or the pill bottles to follow-up visits  Carry your medicine list with you in case of an emergency  What you need to know about COVID-19 vaccines:  Get a vaccine even if you already had COVID-19  · COVID-19 vaccines are given as a shot in 1 or 2 doses  Some vaccines have emergency use authorization (EUA)  An EUA means the vaccine is not approved but is given because the benefits outweigh the risks  A 2-dose vaccine is fully approved for use in those 16 years or older  This vaccine also has an EUA for adolescents 12 to 15 years  Your healthcare provider can help you understand the benefits and risks  · A third dose is recommended for adults with a weakened immune system who get a 2-dose vaccine  The third dose is given at least 28 days after the second  · Even after you get the vaccine, continue social distancing and other measures  Experts are still learning how well the vaccines work to prevent infection, transmission, and severe illness  Although rare, you can become infected after you get the vaccine  You may also be able to pass the virus to others without knowing you are infected  · After you get the vaccine, check local, national, and international travel rules  Check to see if you need to be tested before you travel  You may also need to quarantine after you return  Some countries require proof of a negative test before you leave  You should also be tested 3 to 5 days after you return from another country  How the 2019 coronavirus spreads: The following are ways the virus is thought to spread, but more information may be coming:  · Droplets are the main way all coronaviruses spread  The virus travels in droplets that form when a person talks, coughs, or sneezes  The droplets can also float in the air for minutes or hours  Infection happens when you breathe in the droplets or get them in your eyes or nose  Close personal contact with an infected person increases your risk for infection   This means being within 6 feet (2 meters) of the person for at least 15 minutes over 24 hours  · Person-to-person contact can spread the virus  For example, a person with the virus on his or her hands can spread it by shaking hands with someone  · The virus can stay on objects and surfaces for a short time  You may become infected by touching the object or surface and then touching your eyes or mouth  · An infected animal may be able to infect a person who touches it  This may happen at live markets or on a farm  Help lower the risk for COVID-19:  The best way to prevent infection is to avoid anyone who is infected, but this can be hard to do  An infected person can spread the virus before signs or symptoms begin, or even if signs or symptoms never develop  The following can help lower the risk for infection:      · Wash your hands often throughout the day  Use soap and water  Rub your soapy hands together, lacing your fingers, for at least 20 seconds  Rinse with warm, running water  Dry your hands with a clean towel or paper towel  Use hand  that contains alcohol if soap and water are not available  Teach children how to wash their hands and use hand   · Cover sneezes and coughs  Turn your face away and cover your mouth and nose with a tissue  Throw the tissue away  Use the bend of your arm if a tissue is not available  Then wash your hands well with soap and water or use hand   Teach children how to cover a cough or sneeze  · Wear a face covering (mask) around anyone who does not live in your home  Use a cloth covering with at least 2 layers  You can also create layers by putting a cloth covering over a disposable non-medical mask  Cover your mouth and your nose  The covering should fit snugly against the bridge of your nose  Securely fasten it under your chin and on the sides of your face  Do not  wear a plastic face shield instead of a covering   Continue social distancing and washing your hands often  A face covering is not a substitute for social distancing safety measures  · Follow worldwide, national, and local social distancing guidelines  Keep at least 6 feet (2 meters) between you and others  Also keep this distance from anyone who comes to your home, such as someone making a delivery  Wear a face covering while you are around others  You will need to wear a covering in restaurants, stores, and other public buildings  You will also need a covering on mass transit, such as a bus, subway, or airplane  Remember to use a covering made from thick material or wear 2 coverings together  · Make a habit of not touching your face  If you get the virus on your hands, you can transfer it to your eyes, nose, or mouth and become infected  You can also transfer it to objects, surfaces, or people  Do not touch your eyes, nose, or mouth without washing your hands first     · Clean and disinfect high-touch surfaces and objects often  Use disinfecting wipes, or make a solution of 4 teaspoons of bleach in 1 quart (4 cups) of water  Clean and disinfect even if you think no one living in or coming to your home is infected with the virus  · Ask about other vaccines you may need  Get the influenza (flu) vaccine as soon as recommended each year, usually starting in September or October  Get the pneumonia vaccine if recommended  Your healthcare provider can tell you if you should also get other vaccines, and when to get them  Follow social distancing guidelines:  National and local social distancing rules vary  Rules may change over time as restrictions are lifted  Restrictions may return if an outbreak happens where you live  It is important to know and follow all current social distancing rules in your area  The following are general guidelines:  · Stay home if you are sick or think you may have COVID-19    It is important to stay home if you are waiting for a testing appointment or for test results  Even if you do not have symptoms, you can pass the virus to others  · Limit trips out of your home  Have food, medicines, and other supplies delivered and left at your door or other area, if possible  Plan trips out of your home so you make the fewest stops possible to limit close personal contact  Keep track of places you go  This will help contact tracers notify others if you become infected  · Avoid close physical contact with anyone who does not live in your home  Do not shake hands with, hug, or kiss a person as a greeting  If you must use public transportation (such as a bus or subway), try to sit or stand away from others  Only allow necessary people into your home  Wear your face covering, and remind others to wear a face covering  Remind them to wash their hands when they arrive and before they leave  Do not  let someone into your home or go to someone's home just to visit  Even if you both do not feel sick, the virus can pass from one of you to the other  · Avoid in-person gatherings and crowds  Gatherings or crowds of 10 or more individuals can cause the virus to spread  Avoid places such as [place], beaches, sporting events, and tourist attractions  For events such as parties, holiday meals, [employer] services, and conferences, attend virtually (on a computer), if possible  · Ask your healthcare provider for other ways to have appointments  Some providers offer phone, video, or other types of appointments  You may also be able to get prescriptions for a few months of your medicines at a time  · Stay safe if you must go out to work  Keep physical distance between you and other workers as much as possible  Follow your employer's rules so everyone stays safe  If you have COVID-19 and are recovering at home,  healthcare providers will give you specific instructions to follow   The following are general guidelines to remind you how to keep others safe until you are well:  · Wash your hands often  Use soap and water as much as possible  Use hand  that contains alcohol if soap and water are not available  Dry your hands with a clean towel or paper towel  Do not share towels with anyone  If you use paper towels, throw them away in a lined trash can kept in your room or area  Use a covered trash can, if possible  · Do not go out of your home unless it is necessary  Ask someone who is not infected to go out for groceries or supplies, or have them delivered  Do not go to your healthcare provider's office without an appointment  · Only have close physical contact with a person giving direct care, or a baby or child you must care for  Family members and friends should not visit you  If possible, stay in a separate area or room of your home if you live with others  No one should go into the area or room except to give you care  You can visit with others by phone, video chat, e-mail, or similar systems  · Wear a face covering while others are near you  This can help prevent droplets from spreading the virus when you talk, sneeze, or cough  Put the covering on before anyone comes into your room or area  Remind the person to cover his or her nose and mouth before coming in to provide care for you  · Do not share items  Do not share dishes, towels, or other items with anyone  Items need to be washed after you use them  · Protect your baby  Some newborns have tested positive for the virus  It is not known if they became infected before or after birth  The highest risk is when a  has close contact with an infected person  If you are pregnant or breastfeeding, talk to your healthcare provider or obstetrician about any concerns you have  He or she will tell you when to bring your baby in for check-ups and vaccines  He or she will also tell you what to do if you think your baby was infected with the coronavirus   Wash your hands and put on a clean face covering before you breastfeed or care for your baby  · Do not handle live animals unless it is necessary  Some animals, including pets, have been infected with the new coronavirus  Ask someone who is not infected to take care of your pet until you are well  If you must care for a pet, wear a face covering  Wash your hands before and after you give care  Talk to your healthcare provider about how to keep a service animal safe, if needed  · Follow directions from your healthcare provider for being around others after you recover  It is not known if or for how long a recovered person can pass the virus to others  Your provider may give you instructions, such as continuing social distancing and wearing a face covering  He or she will tell you when it is okay to be around others again  This may be 10 to 20 days after symptoms started or you had a positive test  Most symptoms will also need to be gone  Your provider will give you more information  Follow up with your doctor as directed:  Write down your questions so you remember to ask them during your visits  For more information:   · Centers for Disease Control and Prevention  [address] , [address]  Phone: 0- [phone] - [phone]  Web Address: "[employer] br    © 3564 [facility] 2021 Information is for End User's use only and may not be sold, redistributed or otherwise used for commercial purposes  All illustrations and images included in CareNotes® are the copyrighted property of [employer]  or [facility] Lynne [place]   The above information is an  only  It is not intended as medical advice for individual conditions or treatments  Talk to your doctor, nurse or pharmacist before following any medical regimen to see if it is safe and effective for you

## 2022-02-21 ENCOUNTER — TELEPHONE (OUTPATIENT)
Dept: URGENT CARE | Facility: MEDICAL CENTER | Age: 27
End: 2022-02-21

## 2022-03-24 ENCOUNTER — HOSPITAL ENCOUNTER (OUTPATIENT)
Dept: ULTRASOUND IMAGING | Facility: HOSPITAL | Age: 27
Discharge: HOME | End: 2022-03-24
Payer: COMMERCIAL

## 2022-03-24 ENCOUNTER — HOSPITAL ENCOUNTER (OUTPATIENT)
Dept: MAMMOGRAPHY | Facility: HOSPITAL | Age: 27
Discharge: HOME | End: 2022-03-24
Payer: COMMERCIAL

## 2022-03-24 DIAGNOSIS — N63.0 BREAST LUMP IN FEMALE: ICD-10-CM

## 2022-03-24 PROCEDURE — 76642 ULTRASOUND BREAST LIMITED: CPT

## 2022-04-25 ENCOUNTER — OFFICE VISIT (OUTPATIENT)
Dept: SURGERY | Facility: CLINIC | Age: 27
End: 2022-04-25
Payer: COMMERCIAL

## 2022-04-25 VITALS
SYSTOLIC BLOOD PRESSURE: 120 MMHG | BODY MASS INDEX: 26.73 KG/M2 | WEIGHT: 141.6 LBS | DIASTOLIC BLOOD PRESSURE: 80 MMHG | HEIGHT: 61 IN | HEART RATE: 80 BPM | TEMPERATURE: 97.3 F

## 2022-04-25 DIAGNOSIS — D17.1 LIPOMA OF TORSO: Primary | ICD-10-CM

## 2022-04-25 DIAGNOSIS — D17.1 LIPOMA OF CHEST WALL: ICD-10-CM

## 2022-04-25 PROCEDURE — 1036F TOBACCO NON-USER: CPT | Performed by: SURGERY

## 2022-04-25 PROCEDURE — 99213 OFFICE O/P EST LOW 20 MIN: CPT | Performed by: SURGERY

## 2022-04-25 PROCEDURE — 3008F BODY MASS INDEX DOCD: CPT | Performed by: SURGERY

## 2022-04-25 NOTE — PROGRESS NOTES
Assessment/Plan:   Andres Calloway is a 27 y  o female who is here for No chief complaint on file  On exam found to have Lipoma of the : right chest wall  Plan: Excise lesion(s) under anesthesia in the operating room    Positioning: supine and lateral, right side up  Will need full lateral position  This is more right flank ribcage than anterior  We discussed putting the incision a little bit high on the lesion to try to get this to cosmetically situated itself under the lower bra strap line  Post Op Pain Management:   [facility]              Preoperative Clearance: None          _______________________________________________________  CC:No chief complaint on file  Will Hood HPI:  Andres Calloway is a 27 y  o female who was referred for evaluation of No chief complaint on file       Currently patient reports enlarging right-sided lipoma on the right flank in the posterior axillary line  Is starting to bother her and get under her bra strap     Reports: growing  Location:  Right flank mid axillary line mid to posterior axillary line        ROS:  General ROS: negative  negative for - chills, fatigue, fever or night sweats, weight loss  Respiratory ROS: no cough, shortness of breath, or wheezing  Cardiovascular ROS: no chest pain or dyspnea on exertion  Genito-Urinary ROS: no dysuria, trouble voiding, or hematuria  Musculoskeletal ROS: negative for - gait disturbance, joint pain or muscle pain  Neurological ROS: no TIA or stroke symptoms  Skin ROS: See HPI  GI ROS: see HPI  Skin ROS: no new rashes or lesions   Lymphatic ROS: no new adenopathy noted by pt  Psy ROS: no new mental or behavioral disturbances       Patient Active Problem List   Diagnosis    39 weeks gestation of pregnancy    Uterine contractions during pregnancy    Herpes simplex virus type 2 (HSV-2) infection affecting 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    Lipoma of chest wall    Herpes simplex         Allergies:   Other      Current Outpatient Medications:     Balcoltra 0 1-20 MG-MCG(21) TABS, , Disp: , Rfl:     ciprofloxacin-dexamethasone (CIPRODEX) otic suspension, Administer 4 drops into the left ear 2 (two) times a day for 7 days, Disp: 7 5 mL, Rfl: 0    fluticasone (FLONASE) 50 mcg/act nasal spray, 2 sprays into each nostril daily, Disp: 16 g, Rfl: 0    loratadine (CLARITIN) 10 mg tablet, Take 10 mg by mouth daily  , Disp: , Rfl:     valACYclovir (VALTREX) 500 mg tablet, valacyclovir 500 mg tablet, Disp: , Rfl:     Past Medical History:   Diagnosis Date    Seasonal allergies        Past Surgical History:   Procedure Laterality Date    NO PAST SURGERIES         Family History   Problem Relation Age of Onset    Aneurysm Father         cardiac    Heart disease Father         problem    Leukemia Brother         chronic lymphoid    Aneurysm Paternal Grandfather         cardiac     Throat cancer Family     Alcohol abuse Neg Hx     Substance Abuse Neg Hx     Mental illness Neg Hx         reports that she has never smoked  She has never used smokeless tobacco  She reports current alcohol use  She reports that she does not use drugs  There were no vitals filed for this visit  PHYSICAL EXAM  General Appearance:    Alert, cooperative, no distress,    Head:    Normocephalic without obvious abnormality   Eyes:    PERRL, conjunctiva/corneas clear     Neck:   Supple, no adenopathy, no JVD   Back:     Symmetric, no spinal or CVA tenderness   Lungs:     Clear to auscultation bilaterally, no wheezing or rhonchi   Heart:    Regular rate and rhythm, S1 and S2 normal, no murmur   Abdomen:     Benign, no rebound or guarding  Extremities:   Extremities normal  No clubbing, cyanosis or edema   Psych:   Normal Affect, AOx3  Neurologic:  Skin:   CNII-XII intact   Strength symmetric, speech intact    Warm, dry, intact, no visible rashes or lesions except as follows:     Right flank in the mid to posterior axillary line about retirement down the jeremy Lujan PA-C  Date: 4/25/2022 Time: 11:58 AM

## 2022-06-08 RX ORDER — MULTIVITAMIN
1 TABLET ORAL DAILY
COMMUNITY

## 2022-06-08 NOTE — PRE-PROCEDURE INSTRUCTIONS
Pre-Surgery Instructions:   Medication Instructions    Balcoltra 0 1-20 MG-MCG(21) TABS Take day of surgery   Black Pepper-Turmeric (TURMERIC COMPLEX/BLACK PEPPER PO) Stop taking 7 days prior to surgery   loratadine (CLARITIN) 10 mg tablet Hold day of surgery   Multiple Vitamin (multivitamin) tablet Stop taking 7 days prior to surgery   Probiotic Product (PROBIOTIC DAILY PO) Stop taking 7 days prior to surgery   valACYclovir (VALTREX) 500 mg tablet Uses PRN- OK to take day of surgery     Patient  instructed on use of Dial antibac soap per hospital protocol    Patient instructed to stop all ASA, NSAIDS, vitamins and herbal supplements one week prior to surgery or per Dr Chance Speaker

## 2022-06-13 ENCOUNTER — OFFICE VISIT (OUTPATIENT)
Dept: SURGERY | Facility: CLINIC | Age: 27
End: 2022-06-13

## 2022-06-13 VITALS
BODY MASS INDEX: 26.24 KG/M2 | SYSTOLIC BLOOD PRESSURE: 116 MMHG | TEMPERATURE: 98 F | WEIGHT: 139 LBS | DIASTOLIC BLOOD PRESSURE: 80 MMHG | HEART RATE: 82 BPM | HEIGHT: 61 IN

## 2022-06-13 DIAGNOSIS — D17.1 LIPOMA OF TORSO: Primary | ICD-10-CM

## 2022-06-13 PROBLEM — Z3A.39 39 WEEKS GESTATION OF PREGNANCY: Status: RESOLVED | Noted: 2017-07-02 | Resolved: 2022-06-13

## 2022-06-13 PROCEDURE — PREOP: Performed by: PHYSICIAN ASSISTANT

## 2022-06-13 NOTE — PROGRESS NOTES
Assessment/Plan:   Darion Garcia is a 32 y  o female who is here for   Chief Complaint   Patient presents with    Pre-op Exam     H&P lipoma excision 6/19/2022  No changes regarding the lipoma  Patient noted over the last week or so she has been under some extra stress and has been noticing some tingling sensation on her left side from her face to her arm to her leg, intermittently  On exam found to have Lipoma of the : right chest wall  Plan: Excise lesion(s) under anesthesia in the operating room    Positioning: supine and lateral, right side up  Will need full lateral position  This is more right flank ribcage than anterior  We discussed putting the incision a little bit high on the lesion to try to get this to cosmetically situated itself under the lower bra strap line  Post Op Pain Management:   [facility]    Preoperative Clearance: None          _______________________________________________________  CC:Pre-op Exam (H&P lipoma excision 6/19/2022  No changes regarding the lipoma  /Patient noted over the last week or so she has been under some extra stress and has been noticing some tingling sensation on her left side from her face to her arm to her leg, intermittently  )    HPI:  Darion Garcia is a 32 y  o female who was referred for evaluation of Pre-op Exam (H&P lipoma excision 6/19/2022  No changes regarding the lipoma  /Patient noted over the last week or so she has been under some extra stress and has been noticing some tingling sensation on her left side from her face to her arm to her leg, intermittently  )    Currently patient reports enlarging right-sided lipoma on the right flank in the posterior axillary line  Is starting to bother her and get under her bra strap  Reports: growing   Location:  Right flank mid axillary line mid to posterior axillary line    Patient thinks her tingeling sensation in her legs and arms may be from stress as it just started two weeks ago but today she just has it in her left arm  Instructed her if continues she should see her PCP for blood work and rule out neurologic causes  ROS:  General ROS: negative  negative for - chills, fatigue, fever or night sweats, weight loss  Respiratory ROS: no cough, shortness of breath, or wheezing  Cardiovascular ROS: no chest pain or dyspnea on exertion  Genito-Urinary ROS: no dysuria, trouble voiding, or hematuria  Musculoskeletal ROS: negative for - gait disturbance, joint pain or muscle pain  Neurological ROS: no TIA or stroke symptoms  Skin ROS: See HPI  GI ROS: see HPI  Skin ROS: no new rashes or lesions   Lymphatic ROS: no new adenopathy noted by pt  Psy ROS: no new mental or behavioral disturbances       Patient Active Problem List   Diagnosis    Uterine contractions during pregnancy    Herpes simplex virus type 2 (HSV-2) infection affecting 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    Lipoma of chest wall    Herpes simplex         Allergies:   Other      Current Outpatient Medications:     Balcoltra 0 1-20 MG-MCG(21) TABS, in the morning, Disp: , Rfl:     Black Pepper-Turmeric (TURMERIC COMPLEX/BLACK PEPPER PO), Take by mouth With cayenne (Patient not taking: Reported on 6/13/2022), Disp: , Rfl:     fluticasone (FLONASE) 50 mcg/act nasal spray, 2 sprays into each nostril daily (Patient not taking: Reported on 6/13/2022), Disp: 16 g, Rfl: 0    loratadine (CLARITIN) 10 mg tablet, Take 10 mg by mouth if needed (Patient not taking: Reported on 6/13/2022), Disp: , Rfl:     Multiple Vitamin (multivitamin) tablet, Take 1 tablet by mouth daily (Patient not taking: Reported on 6/13/2022), Disp: , Rfl:     Probiotic Product (PROBIOTIC DAILY PO), Take by mouth (Patient not taking: Reported on 6/13/2022), Disp: , Rfl:     valACYclovir (VALTREX) 500 mg tablet, if needed Usually yearly (Patient not taking: Reported on 6/13/2022), Disp: , Rfl:     Past Medical History:   Diagnosis Date    Exercises two times per week     to 3x/week    Irritable bowel syndrome     Lipoma     Seasonal allergies        Past Surgical History:   Procedure Laterality Date    NO PAST SURGERIES         Family History   Problem Relation Age of Onset    Aneurysm Father         cardiac    Heart disease Father         problem    Leukemia Brother         chronic lymphoid    Aneurysm Paternal Grandfather         cardiac     Throat cancer Family     Alcohol abuse Neg Hx     Substance Abuse Neg Hx     Mental illness Neg Hx         reports that she has never smoked  She has never used smokeless tobacco  She reports current alcohol use  She reports that she does not use drugs  Vitals:    06/13/22 1413   BP: 116/80   Pulse: 82   Temp: 98 °F (36 7 °C)        PHYSICAL EXAM  General Appearance:    Alert, cooperative, no distress,    Head:    Normocephalic without obvious abnormality   Eyes:    PERRL, conjunctiva/corneas clear     Neck:   Supple, no adenopathy, no JVD   Back:     Symmetric, no spinal or CVA tenderness   Lungs:     Clear to auscultation bilaterally, no wheezing or rhonchi   Heart:    Regular rate and rhythm, S1 and S2 normal, no murmur   Abdomen:      Extremities:   Extremities normal  No clubbing, cyanosis or edema   Psych:   Normal Affect, AOx3  Neurologic:  Skin:   CNII-XII intact  Strength symmetric, speech intact    Warm, dry, intact, no visible rashes or lesions except as follows:     Right flank in the mid to posterior axillary line about detention down the ribcage                   Vincent Garcia PA-C  Date: 6/13/2022 Time: 3:09 PM

## 2022-06-13 NOTE — H&P (VIEW-ONLY)
Assessment/Plan:   Nette Suazo is a 32 y  o female who is here for   Chief Complaint   Patient presents with    Pre-op Exam     H&P lipoma excision 6/19/2022  No changes regarding the lipoma  Patient noted over the last week or so she has been under some extra stress and has been noticing some tingling sensation on her left side from her face to her arm to her leg, intermittently  On exam found to have Lipoma of the : right chest wall  Plan: Excise lesion(s) under anesthesia in the operating room    Positioning: supine and lateral, right side up  Will need full lateral position  This is more right flank ribcage than anterior  We discussed putting the incision a little bit high on the lesion to try to get this to cosmetically situated itself under the lower bra strap line  Post Op Pain Management:   [place]    Preoperative Clearance: None          _______________________________________________________  CC:Pre-op Exam (H&P lipoma excision 6/19/2022  No changes regarding the lipoma  /Patient noted over the last week or so she has been under some extra stress and has been noticing some tingling sensation on her left side from her face to her arm to her leg, intermittently  )    HPI:  Nette Suazo is a 32 y  o female who was referred for evaluation of Pre-op Exam (H&P lipoma excision 6/19/2022  No changes regarding the lipoma  /Patient noted over the last week or so she has been under some extra stress and has been noticing some tingling sensation on her left side from her face to her arm to her leg, intermittently  )    Currently patient reports enlarging right-sided lipoma on the right flank in the posterior axillary line  Is starting to bother her and get under her bra strap  Reports: growing   Location:  Right flank mid axillary line mid to posterior axillary line    Patient thinks her tingeling sensation in her legs and arms may be from stress as it just started two weeks ago but today she just has it in her left arm  Instructed her if continues she should see her PCP for blood work and rule out neurologic causes  ROS:  General ROS: negative  negative for - chills, fatigue, fever or night sweats, weight loss  Respiratory ROS: no cough, shortness of breath, or wheezing  Cardiovascular ROS: no chest pain or dyspnea on exertion  Genito-Urinary ROS: no dysuria, trouble voiding, or hematuria  Musculoskeletal ROS: negative for - gait disturbance, joint pain or muscle pain  Neurological ROS: no TIA or stroke symptoms  Skin ROS: See HPI  GI ROS: see HPI  Skin ROS: no new rashes or lesions   Lymphatic ROS: no new adenopathy noted by pt  Psy ROS: no new mental or behavioral disturbances       Patient Active Problem List   Diagnosis    Uterine contractions during pregnancy    Herpes simplex virus type 2 (HSV-2) infection affecting 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    Lipoma of chest wall    Herpes simplex         Allergies:   Other      Current Outpatient Medications:     Balcoltra 0 1-20 MG-MCG(21) TABS, in the morning, Disp: , Rfl:     Black Pepper-Turmeric (TURMERIC COMPLEX/BLACK PEPPER PO), Take by mouth With cayenne (Patient not taking: Reported on 6/13/2022), Disp: , Rfl:     fluticasone (FLONASE) 50 mcg/act nasal spray, 2 sprays into each nostril daily (Patient not taking: Reported on 6/13/2022), Disp: 16 g, Rfl: 0    loratadine (CLARITIN) 10 mg tablet, Take 10 mg by mouth if needed (Patient not taking: Reported on 6/13/2022), Disp: , Rfl:     Multiple Vitamin (multivitamin) tablet, Take 1 tablet by mouth daily (Patient not taking: Reported on 6/13/2022), Disp: , Rfl:     Probiotic Product (PROBIOTIC DAILY PO), Take by mouth (Patient not taking: Reported on 6/13/2022), Disp: , Rfl:     valACYclovir (VALTREX) 500 mg tablet, if needed Usually yearly (Patient not taking: Reported on 6/13/2022), Disp: , Rfl:     Past Medical History:   Diagnosis Date    Exercises two times per week     to 3x/week    Irritable bowel syndrome     Lipoma     Seasonal allergies        Past Surgical History:   Procedure Laterality Date    NO PAST SURGERIES         Family History   Problem Relation Age of Onset    Aneurysm Father         cardiac    Heart disease Father         problem    Leukemia Brother         chronic lymphoid    Aneurysm Paternal Grandfather         cardiac     Throat cancer Family     Alcohol abuse Neg Hx     Substance Abuse Neg Hx     Mental illness Neg Hx         reports that she has never smoked  She has never used smokeless tobacco  She reports current alcohol use  She reports that she does not use drugs  Vitals:    06/13/22 1413   BP: 116/80   Pulse: 82   Temp: 98 °F (36 7 °C)        PHYSICAL EXAM  General Appearance:    Alert, cooperative, no distress,    Head:    Normocephalic without obvious abnormality   Eyes:    PERRL, conjunctiva/corneas clear     Neck:   Supple, no adenopathy, no JVD   Back:     Symmetric, no spinal or CVA tenderness   Lungs:     Clear to auscultation bilaterally, no wheezing or rhonchi   Heart:    Regular rate and rhythm, S1 and S2 normal, no murmur   Abdomen:      Extremities:   Extremities normal  No clubbing, cyanosis or edema   Psych:   Normal Affect, AOx3  Neurologic:  Skin:   CNII-XII intact  Strength symmetric, speech intact    Warm, dry, intact, no visible rashes or lesions except as follows:     Right flank in the mid to posterior axillary line about MCC down the ribcage                   Flavio Conte PA-C  Date: 6/13/2022 Time: 3:09 PM

## 2022-06-14 ENCOUNTER — ANESTHESIA EVENT (OUTPATIENT)
Dept: PERIOP | Facility: HOSPITAL | Age: 27
End: 2022-06-14
Payer: COMMERCIAL

## 2022-06-16 ENCOUNTER — HOSPITAL ENCOUNTER (OUTPATIENT)
Facility: HOSPITAL | Age: 27
Setting detail: OUTPATIENT SURGERY
Discharge: HOME | End: 2022-06-16
Attending: SURGERY | Admitting: SURGERY
Payer: COMMERCIAL

## 2022-06-16 ENCOUNTER — ANESTHESIA (OUTPATIENT)
Dept: PERIOP | Facility: HOSPITAL | Age: 27
End: 2022-06-16
Payer: COMMERCIAL

## 2022-06-16 VITALS
SYSTOLIC BLOOD PRESSURE: 108 MMHG | WEIGHT: 138.45 LBS | HEART RATE: 95 BPM | HEIGHT: 61 IN | DIASTOLIC BLOOD PRESSURE: 64 MMHG | RESPIRATION RATE: 18 BRPM | TEMPERATURE: 98 F | OXYGEN SATURATION: 98 % | BODY MASS INDEX: 26.14 KG/M2

## 2022-06-16 DIAGNOSIS — D17.1 LIPOMA OF TORSO: Primary | ICD-10-CM

## 2022-06-16 LAB
EXT PREGNANCY TEST URINE: NEGATIVE
EXT. CONTROL: NORMAL

## 2022-06-16 PROCEDURE — 88304 TISSUE EXAM BY PATHOLOGIST: CPT | Performed by: PATHOLOGY

## 2022-06-16 PROCEDURE — 81025 URINE PREGNANCY TEST: CPT | Performed by: ANESTHESIOLOGY

## 2022-06-16 PROCEDURE — 21552 EXC NECK LES SC 3 CM/>: CPT | Performed by: SURGERY

## 2022-06-16 PROCEDURE — 21552 EXC NECK LES SC 3 CM/>: CPT | Performed by: PHYSICIAN ASSISTANT

## 2022-06-16 RX ORDER — SODIUM CHLORIDE, SODIUM LACTATE, POTASSIUM CHLORIDE, CALCIUM CHLORIDE 600; 310; 30; 20 MG/100ML; MG/100ML; MG/100ML; MG/100ML
125 INJECTION, SOLUTION INTRAVENOUS CONTINUOUS
Status: DISCONTINUED | OUTPATIENT
Start: 2022-06-16 | End: 2022-06-16 | Stop reason: HOSPADM

## 2022-06-16 RX ORDER — ONDANSETRON 4 MG/1
4 TABLET, ORALLY DISINTEGRATING ORAL EVERY 8 HOURS PRN
Status: DISCONTINUED | OUTPATIENT
Start: 2022-06-16 | End: 2022-06-16 | Stop reason: HOSPADM

## 2022-06-16 RX ORDER — DEXTROSE AND SODIUM CHLORIDE 5; .45 G/100ML; G/100ML
80 INJECTION, SOLUTION INTRAVENOUS CONTINUOUS
Status: DISCONTINUED | OUTPATIENT
Start: 2022-06-16 | End: 2022-06-16 | Stop reason: HOSPADM

## 2022-06-16 RX ORDER — LIDOCAINE HYDROCHLORIDE 10 MG/ML
INJECTION, SOLUTION EPIDURAL; INFILTRATION; INTRACAUDAL; PERINEURAL AS NEEDED
Status: DISCONTINUED | OUTPATIENT
Start: 2022-06-16 | End: 2022-06-16

## 2022-06-16 RX ORDER — ONDANSETRON 2 MG/ML
4 INJECTION INTRAMUSCULAR; INTRAVENOUS EVERY 8 HOURS PRN
Status: DISCONTINUED | OUTPATIENT
Start: 2022-06-16 | End: 2022-06-16 | Stop reason: HOSPADM

## 2022-06-16 RX ORDER — HYDROMORPHONE HCL/PF 1 MG/ML
0.5 SYRINGE (ML) INJECTION
Status: DISCONTINUED | OUTPATIENT
Start: 2022-06-16 | End: 2022-06-16 | Stop reason: HOSPADM

## 2022-06-16 RX ORDER — MEPERIDINE HYDROCHLORIDE 25 MG/ML
12.5 INJECTION INTRAMUSCULAR; INTRAVENOUS; SUBCUTANEOUS
Status: DISCONTINUED | OUTPATIENT
Start: 2022-06-16 | End: 2022-06-16 | Stop reason: HOSPADM

## 2022-06-16 RX ORDER — FENTANYL CITRATE/PF 50 MCG/ML
25 SYRINGE (ML) INJECTION
Status: DISCONTINUED | OUTPATIENT
Start: 2022-06-16 | End: 2022-06-16 | Stop reason: HOSPADM

## 2022-06-16 RX ORDER — DEXAMETHASONE SODIUM PHOSPHATE 10 MG/ML
INJECTION, SOLUTION INTRAMUSCULAR; INTRAVENOUS AS NEEDED
Status: DISCONTINUED | OUTPATIENT
Start: 2022-06-16 | End: 2022-06-16

## 2022-06-16 RX ORDER — KETOROLAC TROMETHAMINE 30 MG/ML
INJECTION, SOLUTION INTRAMUSCULAR; INTRAVENOUS AS NEEDED
Status: DISCONTINUED | OUTPATIENT
Start: 2022-06-16 | End: 2022-06-16

## 2022-06-16 RX ORDER — ACETAMINOPHEN 325 MG/1
650 TABLET ORAL EVERY 6 HOURS PRN
Status: DISCONTINUED | OUTPATIENT
Start: 2022-06-16 | End: 2022-06-16 | Stop reason: HOSPADM

## 2022-06-16 RX ORDER — ONDANSETRON 2 MG/ML
INJECTION INTRAMUSCULAR; INTRAVENOUS AS NEEDED
Status: DISCONTINUED | OUTPATIENT
Start: 2022-06-16 | End: 2022-06-16

## 2022-06-16 RX ORDER — ONDANSETRON 2 MG/ML
4 INJECTION INTRAMUSCULAR; INTRAVENOUS ONCE AS NEEDED
Status: DISCONTINUED | OUTPATIENT
Start: 2022-06-16 | End: 2022-06-16 | Stop reason: HOSPADM

## 2022-06-16 RX ORDER — HYDROCODONE BITARTRATE AND ACETAMINOPHEN 5; 325 MG/1; MG/1
1 TABLET ORAL EVERY 6 HOURS PRN
Qty: 5 TABLET | Refills: 0 | Status: SHIPPED | OUTPATIENT
Start: 2022-06-16 | End: 2022-06-26

## 2022-06-16 RX ORDER — FENTANYL CITRATE 50 UG/ML
INJECTION, SOLUTION INTRAMUSCULAR; INTRAVENOUS AS NEEDED
Status: DISCONTINUED | OUTPATIENT
Start: 2022-06-16 | End: 2022-06-16

## 2022-06-16 RX ORDER — MIDAZOLAM HYDROCHLORIDE 2 MG/2ML
INJECTION, SOLUTION INTRAMUSCULAR; INTRAVENOUS AS NEEDED
Status: DISCONTINUED | OUTPATIENT
Start: 2022-06-16 | End: 2022-06-16

## 2022-06-16 RX ORDER — PROPOFOL 10 MG/ML
INJECTION, EMULSION INTRAVENOUS AS NEEDED
Status: DISCONTINUED | OUTPATIENT
Start: 2022-06-16 | End: 2022-06-16

## 2022-06-16 RX ORDER — HYDROCODONE BITARTRATE AND ACETAMINOPHEN 5; 325 MG/1; MG/1
1 TABLET ORAL EVERY 6 HOURS PRN
Status: DISCONTINUED | OUTPATIENT
Start: 2022-06-16 | End: 2022-06-16 | Stop reason: HOSPADM

## 2022-06-16 RX ORDER — CEFAZOLIN SODIUM 1 G/50ML
1000 SOLUTION INTRAVENOUS ONCE
Status: COMPLETED | OUTPATIENT
Start: 2022-06-16 | End: 2022-06-16

## 2022-06-16 RX ADMIN — PROPOFOL 180 MG: 10 INJECTION, EMULSION INTRAVENOUS at 08:30

## 2022-06-16 RX ADMIN — DEXAMETHASONE SODIUM PHOSPHATE 10 MG: 10 INJECTION INTRAMUSCULAR; INTRAVENOUS at 08:30

## 2022-06-16 RX ADMIN — MIDAZOLAM 2 MG: 1 INJECTION INTRAMUSCULAR; INTRAVENOUS at 08:27

## 2022-06-16 RX ADMIN — LIDOCAINE HYDROCHLORIDE 100 MG: 10 INJECTION, SOLUTION EPIDURAL; INFILTRATION; INTRACAUDAL; PERINEURAL at 08:30

## 2022-06-16 RX ADMIN — ONDANSETRON 4 MG: 2 INJECTION INTRAMUSCULAR; INTRAVENOUS at 08:30

## 2022-06-16 RX ADMIN — SODIUM CHLORIDE, SODIUM LACTATE, POTASSIUM CHLORIDE, AND CALCIUM CHLORIDE 125 ML/HR: .6; .31; .03; .02 INJECTION, SOLUTION INTRAVENOUS at 06:47

## 2022-06-16 RX ADMIN — SODIUM CHLORIDE, SODIUM LACTATE, POTASSIUM CHLORIDE, AND CALCIUM CHLORIDE: .6; .31; .03; .02 INJECTION, SOLUTION INTRAVENOUS at 09:16

## 2022-06-16 RX ADMIN — CEFAZOLIN SODIUM 1000 MG: 1 SOLUTION INTRAVENOUS at 08:20

## 2022-06-16 RX ADMIN — KETOROLAC TROMETHAMINE 30 MG: 30 INJECTION, SOLUTION INTRAMUSCULAR at 09:03

## 2022-06-16 RX ADMIN — FENTANYL CITRATE 50 MCG: 50 INJECTION INTRAMUSCULAR; INTRAVENOUS at 08:38

## 2022-06-16 NOTE — INTERVAL H&P NOTE
H&P reviewed  After examining the patient I find no changes in the patients condition since the H&P had been written  Vitals:    06/16/22 0639   BP: 110/73   Pulse: 99   Resp: 16   Temp: 98 2 °F (36 8 °C)   SpO2: 98%     We discussed that this is not a cosmetic operation and this is a large lipoma that is interfering with her ability to put her arm down straight  She will have a scar and it may be visible, and she may have some continued asymmetry on that side  It also may come back with about a 15-20% rate of recurrence  She understands and agrees to proceed with the excision      Signature:   Charmayne Honey, MD  Date: 6/16/2022 Time: 8:36 AM

## 2022-06-16 NOTE — DISCHARGE INSTRUCTIONS
[facility] Instructions  Dr Velia Roe MD, FACS    1  General: You will feel pulling sensations around the wound or funny aches and pains around the incisions  This is normal  Even minor surgery is a change in your body and this is your bodys way of reaction to it  If you have had abdominal surgery, it may help to support the incision with a small pillow or blanket for comfort when moving or coughing  2  Wound care: IF you have white tape over your incisions, make sure to remove the bandage in about 24 hours, unless instructed otherwise  You usually don't have to redress the wound after 24-48 hours, unless for comfort  Keep the incision clean and dry  Let air get to it  If this Steri-Strips fall off, just keep the wound clean  IF you have a white Mepilex with clear edges you leave that on for three days and can shower over the bandage  Then remove bandage and leave the blue and steri strip over the incision  If those fall or peel off that is ok  3  Water: You may shower over the wound, unless there are drain tubes left in place  Do not bathe or use a pool or hot tub until cleared by the physician  You may shower right over the staples or Steri-Strips and packing dry when you are done  4  Activity: You may go up and down stairs, walk as much as you are comfortable, but walk at least 3 times each day  If you have had abdominal surgery, do not lift anything heavier than 15 pounds for at least 2-4 weeks, unless cleared by the doctor  5  Diet: You may resume a regular diet  If you had a same-day surgery or overnight stay surgery, you may wish to eat lightly for a few days: soups, crackers, and sandwiches  You may resume a regular diet when ready  6  Medications: Resume all of your previous medications, unless told otherwise by the doctor  Avoid aspirin or ibuprofen (Advil, Motrin, etc ) products for 2-3 days after the date of surgery   You may, at that time, began to take them again  Tylenol is always fine, unless you are taking any narcotic pain medication containing Tylenol (such as Percocet, Darvocet, Vicodin, or anything containing acetaminophen)  Do not take Tylenol if you're taking these medications  You do not need to take the narcotic pain medications unless you are having significant pain and discomfort  7  Driving: You will need someone to drive you home on the day of surgery  Do not drive or make any important decisions while on narcotic pain medication or 24 hours and after anesthesia or sedation for surgery  Generally, you may drive when your off all narcotic pain medications  8  Upset Stomach: You may take Maalox, Tums, or similar items for an upset stomach  If your narcotic pain medication causes an upset stomach, do not take it on an empty stomach  Try taking it with at least some crackers or toast      9  Constipation: Patients often experienced constipation after surgery  You may take over-the-counter medication for this, such as Metamucil, Senokot, Dulcolax, milk of magnesia, etc  You may take a suppository unless you have had anorectal surgery such as a procedure on your hemorrhoids  If you experience significant nausea or vomiting after abdominal surgery, call the office before trying any of these medications  10  Call the office: If you are experiencing any of the following, fevers above 101 5°, significant nausea or vomiting, if the wound develops drainage and/or is excessive redness around the wound, or if you have significant diarrhea or other worsening symptoms  11  Pain: You may be given a prescription for pain  This will be given to the hospital, the day of surgery  12  Sexual Activity: You may resume sexual activity when you feel ready and comfortable and your incision is sealed and healed without apparent infection risk      13  Urination: If you haven't urinated in 6 hours, go directly to the ER for evaluation for urinary retention       [facility] [address]  [address] [facility], [address]  Phone: [phone]

## 2022-06-16 NOTE — ANESTHESIA PREPROCEDURE EVALUATION
Procedure:  CHEST WALL EXCISION WIDE LESION (Right Chest)    Relevant Problems   Other   (+) Lipoma of chest wall        Physical Exam    Airway    Mallampati score: I  TM Distance: <3 FB  Neck ROM: full     Dental       Cardiovascular  Rhythm: regular, Rate: normal, Cardiovascular exam normal    Pulmonary  Pulmonary exam normal     Other Findings        Anesthesia Plan  ASA Score- 1     Anesthesia Type- general with ASA Monitors  Additional Monitors:   Airway Plan: ETT and LMA  Plan Factors-Exercise tolerance (METS): >4 METS  Chart reviewed  Existing labs reviewed  Patient summary reviewed  Patient is not a current smoker  Patient did not smoke on day of surgery  Obstructive sleep apnea risk education given perioperatively  Induction- intravenous  Postoperative Plan-     Informed Consent- Anesthetic plan and risks discussed with patient

## 2022-06-16 NOTE — ANESTHESIA POSTPROCEDURE EVALUATION
Post-Op Assessment Note    CV Status:  Stable  Pain Score: 0    Pain management: adequate     Mental Status:  Alert and awake   Hydration Status:  Euvolemic   PONV Controlled:  Controlled   Airway Patency:  Patent      Post Op Vitals Reviewed: Yes      Staff: Anesthesiologist         No complications documented      BP      Temp      Pulse     Resp      SpO2      /64   Pulse 95   Temp 98 °F (36 7 °C) (Temporal)   Resp 18   Ht 5' 1" (1 549 m)   Wt 62 8 kg (138 lb 7 2 oz)   LMP 05/02/2022 (Approximate)   SpO2 98%   Breastfeeding No   BMI 26 16 kg/m²

## 2022-06-16 NOTE — OP NOTE
CHEST WALL EXCISION  Postoperative Note  PATIENT NAME: Jerry Mccoy  : 1995  MRN: [id number]  [facility] OR ROOM 06      Consent:  The risks, benefits, and alternatives to the surgery were discussed with the patient and with the family prior to surgery, personally by Dr Debra Gomez  If the consent was obtained by the physician assistant or other representative, the consent was reviewed once again personally by the operating physician  Common complications particular for this procedure as well as unusual complications were discussed, including but not limited to:  bleeding, wound infection, prolonged wound healing, open wounds, reoperation and/or recurrence of the lesion  A  was used if necessary  The patient expressed understanding of the issues discussed and wished and consented to the procedure to proceed  All questions were answered  Dr Debra Gomez personally discussed the informed consent with this patient  Surgery Date: 2022    Pre operative diagnosis:   Lipoma of torso [D17 1]    Operative Indications:  Soft tissue mass    Operative Findings:    Final Size of the lesion is estimated at: 10 cm , inclusive of margins  Post operative diagnosis :and findings  Post-Op Diagnosis Codes:     * Lipoma of torso [D17 1]    Procedure:   Procedure(s):  CHEST WALL EXCISION    Surgeon(s) and Role:     * Walker Matrinez MD - Primary  The assistant was medically necessary for surgical safety the case including suturing, retraction, and hemostasis  A qualified resident was not available  I provided direct and immediate supervision  I was present for the entire procedure  Drains:  * No LDAs found *    Specimens:  ID Type Source Tests Collected by Time Destination   1 : Lipoma right flank Tissue Soft Tissue, Lipoma TISSUE EXAM Walker Martinez MD 2022 [id number]        Estimated Blood Loss:   Minimal    Anesthesia Type:   Choice     Procedure: The patient was seen in the Holding Room  The risks, benefits, complications, treatment options, and expected outcomes were discussed with the patient  The possibilities of reaction to medication, bleeding, infection, the need for additional procedures, failure to diagnose a condition, and creating a complication operation were discussed with the patient  The patient concurred with the proposed plan, giving informed consent  The site of surgery properly noted/marked  The patient was taken to Operating Room, identified as Yaneli Arnold and staff verified the patient name, , site, and laterality, if applicable  A Time Out was held and the above information confirmed  The patient was placed lateral - right side up  The Right flank/axillary midline of the chest wall was prepped and draped in standard fashion  Local anesthesia was used to anesthetize the skin surrounding the lesion  A oblique elliptical incision was made over the lesion  Sharp and blunt dissection were used to mobilize the mass which was in a subcutaneous location  Hemostasis was achieved with cautery  Scissors, knife, and cautery were used in the excision so that 3mm  margins were taken around the lesion  Tissue removed: without necrosis, devitalization, and non viable tissue     Type Tissue removed: skin, subcutaneous tissue and fat    Closure was achieved a with layered closure utilizing a 3-0 Vicryl subcutaneous layer and a  4-0 Monocryl subcuticular stitch  Steristrips  and Histoacryl  applied and the wound dressed  Sponge count and needle count and instrument count were correct x2, and RFA wanding for sponges was also negative at the end of the procedure prior to closure       Skin and soft tissue/subcutaneous tissue and fat were removed along with the mass  Some portions of this records may have been generated with voice recognition software  There may be translation, syntax,  or grammatical errors   Occasional wrong word or "sound-a-like" substitutions may have occurred due to the inherent limitations of the voice recognition software  Read the chart carefully and recognize, using context, where substations may have occurred  If you have any questions, please contact the dictating provider for clarification or correction, as needed         Complications: None    Condition: Stable to PACU    SIGNATURE: Lianet Reddy MD   DATE: June 16, 2022   TIME: 9:11 AM

## 2022-06-17 ENCOUNTER — TELEPHONE (OUTPATIENT)
Dept: SURGERY | Facility: CLINIC | Age: 27
End: 2022-06-17

## 2022-06-17 NOTE — TELEPHONE ENCOUNTER
S/P = LIPOMA EXCISION = 6/16/2022    Spoke with the patient  She denies having any F/V/N/C and has not had a BM yet  Instructions provided regarding BMs  She noted she already showered last night but left the outer bandage on  I instructed her to remove the outer bandage at this point  Leave the steri-strip/glue over the incision - just the outer bandage comes off  Don't pick/peel/pull on the steri-strip and don't use any antibacterial treatments/creams/lotions/ointments on the incision  Keep the incision clean and dry as best she can, ok to let the soap/water run down over the incision when showering  Monitor the incision for signs of infection  Swelling she can use ice 20 mins on 20 mins off  Can use Tylenol/Motrin/Aleve if needed for pain/discomfort if she does not want/feel the need to take the pain medications prescribed  Aware the lipoma was sent for pathology, we will call with the results or discuss them at her post op appt  Post op appt confirmed  Path pending

## 2022-06-18 ENCOUNTER — NURSE TRIAGE (OUTPATIENT)
Dept: OTHER | Facility: OTHER | Age: 27
End: 2022-06-18

## 2022-06-18 NOTE — TELEPHONE ENCOUNTER
Reason for Disposition   [1] Caller has URGENT question AND [2] triager unable to answer question    Answer Assessment - Initial Assessment Questions  1  SYMPTOM: "What's the main symptom you're concerned about?" (e g , pain, fever, vomiting)      Shocking sensation going down her right arm  Took bandage off yesterday and incision is covered with steri strip  Tingling sensation      2  ONSET: "When did   start?"      Today  3  SURGERY: "What surgery was performed?"      Lipoma excision  4  DATE of SURGERY: "When was surgery performed?"       Thursday 6/16    5  ANESTHESIA: " What type of anesthesia did you have?" (e g , general, spinal, epidural, local)      General    6  PAIN: "Is there any pain?" If Yes, ask: "How bad is it?"  (Scale 1-10; or mild, moderate, severe)      Mild pain to incision  7  FEVER: "Do you have a fever?" If Yes, ask: "What is your temperature, how was it measured, and when did it start?"      Denies fever  8  VOMITING: "Is there any vomiting?" If yes, ask: "How many times?"      No vomiting  9  BLEEDING: "Is there any bleeding?" If Yes, ask: "How much?" and "Where?"      No visible bleeding  10  OTHER SYMPTOMS: "Do you have any other symptoms?" (e g , drainage from wound, painful urination, constipation)        No other symptoms  Slight lightheadness  Protocols used: POST-OP SYMPTOMS AND QUESTIONS-ADULT-    Pt wondering if tingling in her arm is related to her surgery  Per Dr Yung Bar- uncertain what tingling sensation would be from since it is not related to where her surgery was performed  Does not believe symptom is related to surgery  Recommended increase fluids for lightheadedness & stated pt is also free to go to ER if concerned  Pt triaged for arm tingling and recommendation given to pt per protocol since Surgeon does not feel symptom is related to procedure

## 2022-06-18 NOTE — TELEPHONE ENCOUNTER
Reason for Disposition   [1] Tingling (e g , pins and needles) of the face, arm / hand, or leg / foot on one side of the body AND [2] present now (Exceptions: chronic/recurrent symptom lasting > 4 weeks or tingling from known cause, such as: bumped elbow, carpal tunnel syndrome, pinched nerve, frostbite)    Protocols used: NEUROLOGIC DEFICIT-ADULT-

## 2022-06-22 NOTE — TELEPHONE ENCOUNTER
Called and spoke to patient  She states that whatever issue she had regarding numbness, tingling - has resolved itself  She states that she's doing well  Little sore from surgery  Confirmed POPV

## 2022-06-30 ENCOUNTER — OFFICE VISIT (OUTPATIENT)
Dept: SURGERY | Facility: CLINIC | Age: 27
End: 2022-06-30

## 2022-06-30 VITALS
BODY MASS INDEX: 25.98 KG/M2 | HEIGHT: 61 IN | TEMPERATURE: 97.7 F | SYSTOLIC BLOOD PRESSURE: 100 MMHG | HEART RATE: 75 BPM | DIASTOLIC BLOOD PRESSURE: 70 MMHG | WEIGHT: 137.6 LBS

## 2022-06-30 DIAGNOSIS — Z09 POSTOP CHECK: Primary | ICD-10-CM

## 2022-06-30 PROCEDURE — 99024 POSTOP FOLLOW-UP VISIT: CPT | Performed by: PHYSICIAN ASSISTANT

## 2022-06-30 NOTE — PROGRESS NOTES
Assessment/Plan:   Ginette Pratt is a 32 y  o female who comes in today for postoperative check after lipoma removal in the OR with Dr Samantha Payne on 6/16/22  Pathology: in process awaiting results will call when return  Postoperative restrictions reviewed  All questions answered  ______________________________________________________  HPI:  Ginette Pratt is a 32 y  o female who comes in today for postoperative check after lipoma removal in the OR with Dr Samantha Payne on 6/16/22  Currently doing well without problems, no fever or chills,no nausea and no vomiting  Reports some swelling at incision site  ROS:  General ROS: negative for - chills, fatigue, fever or night sweats, weight loss  Respiratory ROS: no cough, shortness of breath, or wheezing  Cardiovascular ROS: no chest pain or dyspnea on exertion  Genito-Urinary ROS: no dysuria, trouble voiding, or hematuria  Musculoskeletal ROS: negative for - gait disturbance, joint pain or muscle pain  Neurological ROS: no TIA or stroke symptoms  GI ROS: see HPI  Skin ROS: no new rashes or lesions   Lymphatic ROS: no new adenopathy noted by pt  GYN ROS: see HPI, no new GYN history or bleeding noted  Psy ROS: no new mental or behavioral disturbances         Patient Active Problem List   Diagnosis    Uterine contractions during pregnancy    Herpes simplex virus type 2 (HSV-2) infection affecting 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    Lipoma of chest wall    Herpes simplex       Allergies:   Other      Current Outpatient Medications:     Balcoltra 0 1-20 MG-MCG(21) TABS, in the morning, Disp: , Rfl:     Black Pepper-Turmeric (TURMERIC COMPLEX/BLACK PEPPER PO), Take by mouth With cayenne (Patient not taking: No sig reported), Disp: , Rfl:     fluticasone (FLONASE) 50 mcg/act nasal spray, 2 sprays into each nostril daily (Patient not taking: No sig reported), Disp: 16 g, Rfl: 0    loratadine (CLARITIN) 10 mg tablet, Take 10 mg by mouth if needed (Patient not taking: No sig reported), Disp: , Rfl:     Multiple Vitamin (multivitamin) tablet, Take 1 tablet by mouth daily (Patient not taking: No sig reported), Disp: , Rfl:     Probiotic Product (PROBIOTIC DAILY PO), Take by mouth (Patient not taking: No sig reported), Disp: , Rfl:     valACYclovir (VALTREX) 500 mg tablet, if needed Usually yearly (Patient not taking: No sig reported), Disp: , Rfl:     Past Medical History:   Diagnosis Date    Exercises two times per week     to 3x/week    Irritable bowel syndrome     Lipoma     Seasonal allergies        Past Surgical History:   Procedure Laterality Date    NO PAST SURGERIES      SD EXC SKIN BENIG <0 5 CM TRUNK,ARM,LEG Right 6/16/2022    Procedure: CHEST WALL EXCISION;  Surgeon: Sun Bartholomew MD;  Location: [facility] OR;  Service: General       Family History   Problem Relation Age of Onset    Aneurysm Father         cardiac    Heart disease Father         problem    Leukemia Brother         chronic lymphoid    Aneurysm Paternal Grandfather         cardiac     Throat cancer Family     Alcohol abuse Neg Hx     Substance Abuse Neg Hx     Mental illness Neg Hx         reports that she has never smoked  She has never used smokeless tobacco  She reports current alcohol use  She reports that she does not use drugs  Vitals:    06/30/22 1310   BP: 100/70   Pulse: 75   Temp: 97 7 °F (36 5 °C)       PHYSICAL EXAM  General: normal, cooperative, no distress  Abdominal: soft, nondistended or nontender  Incision: clean, dry, and intact and healing well; some swelling where lipoma was removed but no erythema or signs of infection      Gearisma James PA-C  Date: 6/30/2022 Time: 1:24 PM

## 2022-08-07 ENCOUNTER — OFFICE VISIT (OUTPATIENT)
Dept: URGENT CARE | Facility: MEDICAL CENTER | Age: 27
End: 2022-08-07
Payer: COMMERCIAL

## 2022-08-07 VITALS
SYSTOLIC BLOOD PRESSURE: 116 MMHG | TEMPERATURE: 99.1 F | DIASTOLIC BLOOD PRESSURE: 71 MMHG | OXYGEN SATURATION: 100 % | WEIGHT: 135 LBS | RESPIRATION RATE: 20 BRPM | HEIGHT: 61 IN | BODY MASS INDEX: 25.49 KG/M2 | HEART RATE: 95 BPM

## 2022-08-07 DIAGNOSIS — R05.1 ACUTE COUGH: ICD-10-CM

## 2022-08-07 DIAGNOSIS — J02.9 SORE THROAT: Primary | ICD-10-CM

## 2022-08-07 LAB
S PYO AG THROAT QL: NEGATIVE
SARS-COV-2 AG UPPER RESP QL IA: NEGATIVE
VALID CONTROL: NORMAL

## 2022-08-07 PROCEDURE — 87880 STREP A ASSAY W/OPTIC: CPT | Performed by: PHYSICIAN ASSISTANT

## 2022-08-07 PROCEDURE — 87811 SARS-COV-2 COVID19 W/OPTIC: CPT | Performed by: PHYSICIAN ASSISTANT

## 2022-08-07 PROCEDURE — G0382 LEV 3 HOSP TYPE B ED VISIT: HCPCS | Performed by: PHYSICIAN ASSISTANT

## 2022-08-08 NOTE — PATIENT INSTRUCTIONS
Patient was educated on rapid strep and rapid COVID 19 are negative  Patient was educated on taking OTC Tylenol and motrin for pain  Patient was told if symptoms persist I recommend following up with PCP  Pharyngitis   WHAT YOU NEED TO KNOW:   Pharyngitis, or sore throat, is inflammation of the tissues and structures in your pharynx (throat)  Pharyngitis is most often caused by bacteria  It may also be caused by a cold or flu virus  Other causes include smoking, allergies, or acid reflux  DISCHARGE INSTRUCTIONS:   Call 911 for any of the following: You have trouble breathing or swallowing because your throat is swollen or sore  Return to the emergency department if:   You are drooling because it hurts too much to swallow  Your fever is higher than 102? F (39?C) or lasts longer than 3 days  You are confused  You taste blood in your throat  Contact your healthcare provider if:   Your throat pain gets worse  You have a painful lump in your throat that does not go away after 5 days  Your symptoms do not improve after 5 days  You have questions or concerns about your condition or care  Medicines:  Viral pharyngitis will go away on its own without treatment  Your sore throat should start to feel better in 3 to 5 days for both viral and bacterial infections  You may need any of the following:  Antibiotics  treat a bacterial infection  NSAIDs , such as ibuprofen, help decrease swelling, pain, and fever  NSAIDs can cause stomach bleeding or kidney problems in certain people  If you take blood thinner medicine, always ask your healthcare provider if NSAIDs are safe for you  Always read the medicine label and follow directions  Acetaminophen  decreases pain and fever  It is available without a doctor's order  Ask how much to take and how often to take it  Follow directions  Acetaminophen can cause liver damage if not taken correctly  Take your medicine as directed  Contact your healthcare provider if you think your medicine is not helping or if you have side effects  Tell him or her if you are allergic to any medicine  Keep a list of the medicines, vitamins, and herbs you take  Include the amounts, and when and why you take them  Bring the list or the pill bottles to follow-up visits  Carry your medicine list with you in case of an emergency  Manage your symptoms:   Gargle salt water  Mix ¼ teaspoon salt in an 8 ounce glass of warm water and gargle  This may help decrease swelling in your throat  Drink liquids as directed  You may need to drink more liquids than usual  Liquids may help soothe your throat and prevent dehydration  Ask how much liquid to drink each day and which liquids are best for you  Use a cool-steam humidifier  to help moisten the air in your room and calm your cough  Soothe your throat  with cough drops, ice, soft foods, or popsicles  Prevent the spread of pharyngitis:  Cover your mouth and nose when you cough or sneeze  Do not share food or drinks  Wash your hands often  Use soap and water  If soap and water are unavailable, use an alcohol based hand   Follow up with your doctor as directed:  Write down your questions so you remember to ask them during your visits  © Copyright [employer] 2022 Information is for End User's use only and may not be sold, redistributed or otherwise used for commercial purposes  All illustrations and images included in CareNotes® are the copyrighted property of A [employer]  or Divya Thurman  The above information is an  only  It is not intended as medical advice for individual conditions or treatments  Talk to your doctor, nurse or pharmacist before following any medical regimen to see if it is safe and effective for you

## 2022-08-08 NOTE — PROGRESS NOTES
[facility]        NAME: Adali Ulloa is a 32 y o  female  : 1995    MRN: [id number]  DATE: 2022  TIME: 8:55 PM    Assessment and Plan   Sore throat [J02 9]  1  Sore throat  POCT rapid strepA    Poct Covid 19 Rapid Antigen Test   2  Acute cough  Poct Covid 19 Rapid Antigen Test     Patient was educated on taking OTC flonase and OTC allergy medications  Patient was educated on Choloraseptic spray for throat pain  Patient Instructions        Patient was educated on rapid strep and rapid COVID 19 are negative  Patient was educated on taking OTC Tylenol and motrin for pain  Patient was told if symptoms persist I recommend following up with PCP  Chief Complaint     Chief Complaint   Patient presents with    Sore Throat     Patient states she states she started 2 days ago with sore throat, ear pain, eyes pain, head stuffiness  Neck and upper back tight         History of Present Illness       Patient is here today complaining of sore throat, congestion, gland swelling and cough  Patient reports symptoms started 2-3 days ago  Patient report she tried OTC Advil sinus and cold with no relief  Patient is not vaccinated for COVID 19 and denies any allergies to medications  Patient also reports she is getting headache  Denies any visual disturbances  Admits her neck has been getting tight when she gets headaches  Review of Systems   Review of Systems   Constitutional: Negative  HENT: Positive for congestion, sinus pressure, sinus pain and sore throat  Respiratory: Positive for cough  Cardiovascular: Negative  Musculoskeletal: Positive for neck pain  Neurological: Positive for headaches  Psychiatric/Behavioral: Negative            Current Medications       Current Outpatient Medications:     Balcoltra 0 1-20 MG-MCG(21) TABS, in the morning, Disp: , Rfl:     Black Pepper-Turmeric (TURMERIC COMPLEX/BLACK PEPPER PO), Take by mouth With cayenne (Patient not taking: No sig reported), Disp: , Rfl:     fluticasone (FLONASE) 50 mcg/act nasal spray, 2 sprays into each nostril daily (Patient not taking: No sig reported), Disp: 16 g, Rfl: 0    loratadine (CLARITIN) 10 mg tablet, Take 10 mg by mouth if needed (Patient not taking: No sig reported), Disp: , Rfl:     Multiple Vitamin (multivitamin) tablet, Take 1 tablet by mouth daily (Patient not taking: No sig reported), Disp: , Rfl:     Probiotic Product (PROBIOTIC DAILY PO), Take by mouth (Patient not taking: No sig reported), Disp: , Rfl:     valACYclovir (VALTREX) 500 mg tablet, if needed Usually yearly (Patient not taking: No sig reported), Disp: , Rfl:     Current Allergies     Allergies as of 08/07/2022 - Reviewed 08/07/2022   Allergen Reaction Noted    Other Allergic Rhinitis 10/23/2017            The following portions of the patient's history were reviewed and updated as appropriate: allergies, current medications, past family history, past medical history, past social history, past surgical history and problem list      Past Medical History:   Diagnosis Date    Exercises two times per week     to 3x/week    Irritable bowel syndrome     Lipoma     Seasonal allergies        Past Surgical History:   Procedure Laterality Date    NO PAST SURGERIES      ID EXC SKIN BENIG <0 5 CM TRUNK,ARM,LEG Right 6/16/2022    Procedure: CHEST WALL EXCISION;  Surgeon: Crispin Velasco MD;  Location: [facility];  Service: General       Family History   Problem Relation Age of Onset    Aneurysm Father         cardiac    Heart disease Father         problem    Leukemia Brother         chronic lymphoid    Aneurysm Paternal Grandfather         cardiac     Throat cancer Family     Alcohol abuse Neg Hx     Substance Abuse Neg Hx     Mental illness Neg Hx          Medications have been verified          Objective   /71   Pulse 95   Temp 99 1 °F (37 3 °C)   Resp 20   Ht 5' 1" (1 549 m)   Wt 61 2 kg (135 lb)   SpO2 100%   BMI 25 51 kg/m²   No LMP recorded  Physical Exam     Physical Exam  Vitals and nursing note reviewed  Constitutional:       Appearance: Normal appearance  HENT:      Head: Normocephalic  Comments: Mild pain over frontal and maxillary sinus     Right Ear: Tympanic membrane, ear canal and external ear normal       Left Ear: Ear canal and external ear normal       Ears:      Comments: Left TM is mildly red     Mouth/Throat:      Mouth: Mucous membranes are moist       Pharynx: Posterior oropharyngeal erythema present  No oropharyngeal exudate  Eyes:      Extraocular Movements: Extraocular movements intact  Pupils: Pupils are equal, round, and reactive to light  Neck:      Comments: No palpable lymph nodes  Cardiovascular:      Rate and Rhythm: Normal rate and regular rhythm  Heart sounds: Normal heart sounds  Pulmonary:      Breath sounds: Normal breath sounds  No wheezing  Abdominal:      Palpations: Abdomen is soft  Musculoskeletal:      Comments: Mild pain over cervical para-spinals  NO pain over cervical spine  Neurological:      General: No focal deficit present  Mental Status: She is alert and oriented to person, place, and time     Psychiatric:         Mood and Affect: Mood normal          Behavior: Behavior normal

## 2022-08-15 ENCOUNTER — OFFICE VISIT (OUTPATIENT)
Dept: SURGERY | Facility: CLINIC | Age: 27
End: 2022-08-15

## 2022-08-15 DIAGNOSIS — D17.1 LIPOMA OF CHEST WALL: Primary | ICD-10-CM

## 2022-08-15 PROCEDURE — 99024 POSTOP FOLLOW-UP VISIT: CPT | Performed by: PHYSICIAN ASSISTANT

## 2022-08-15 NOTE — PROGRESS NOTES
Assessment/Plan:   Judy Vazquez is a 32 y  o female who comes in today for postoperative check after  lipoma removal (6/16/2022) on R chest  Scar ~2 inches  Healing well  No fever, erythema, bleeding, or pus  Concerns for cosmetic appearance  Pathology:   Soft Tissue, Lipoma, Lipoma right flank:  Benign fat and skin compatible with lipoma (no inflammation)      Plan:  Continue to monitor for any acute symptoms - bleeding, acute pain, pus, erythema around incisional scar      Pathology: Reviewed with patient, all questions answered  Postoperative restrictions reviewed  All questions answered  ______________________________________________________  HPI:  Judy Vazquez is a 32 y  o female who comes in today for postoperative check after recent lipoma removal (6/16/2022) on R chest  C/o of cosmetic look of scar  Uses Vitamin E to improve scar appearance and asked about massaging the area  Denies fever, nausea, vomiting, constipation, diarrhea  Currently doing well without problems, no fever, normal activities, no fever or chills,no nausea and no vomiting  ROS:  General ROS: negative for - chills, fatigue, fever or night sweats, weight loss  Respiratory ROS: no cough, shortness of breath, or wheezing  Cardiovascular ROS: no chest pain or dyspnea on exertion  Genito-Urinary ROS: no dysuria, trouble voiding, or hematuria  Musculoskeletal ROS: negative for - gait disturbance, joint pain or muscle pain  Neurological ROS: no TIA or stroke symptoms  GI ROS: see HPI  Skin ROS: no new rashes or lesions   Lymphatic ROS: no new adenopathy noted by pt  GYN ROS: see HPI, no new GYN history or bleeding noted  Psy ROS: no new mental or behavioral disturbances     Physical Exam  Vitals reviewed  Constitutional:       General: She is not in acute distress  Appearance: Normal appearance  She is not ill-appearing, toxic-appearing or diaphoretic     Cardiovascular:      Rate and Rhythm: Normal rate and regular rhythm  Pulmonary:      Effort: Pulmonary effort is normal       Breath sounds: Normal breath sounds  Skin:     General: Skin is warm  Coloration: Skin is not ashen  Comments: 2 inch incision scar sp lipoma removal 6/22  Healing well  Neurological:      Mental Status: She is alert and oriented to person, place, and time  Mental status is at baseline  Patient Active Problem List   Diagnosis    Uterine contractions during pregnancy    Herpes simplex virus type 2 (HSV-2) infection affecting 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    Lipoma of chest wall    Herpes simplex       Allergies:   Other      Current Outpatient Medications:     Balcoltra 0 1-20 MG-MCG(21) TABS, in the morning, Disp: , Rfl:     Black Pepper-Turmeric (TURMERIC COMPLEX/BLACK PEPPER PO), Take by mouth With cayenne (Patient not taking: No sig reported), Disp: , Rfl:     fluticasone (FLONASE) 50 mcg/act nasal spray, 2 sprays into each nostril daily (Patient not taking: No sig reported), Disp: 16 g, Rfl: 0    loratadine (CLARITIN) 10 mg tablet, Take 10 mg by mouth if needed (Patient not taking: No sig reported), Disp: , Rfl:     Multiple Vitamin (multivitamin) tablet, Take 1 tablet by mouth daily (Patient not taking: No sig reported), Disp: , Rfl:     Probiotic Product (PROBIOTIC DAILY PO), Take by mouth (Patient not taking: No sig reported), Disp: , Rfl:     valACYclovir (VALTREX) 500 mg tablet, if needed Usually yearly (Patient not taking: No sig reported), Disp: , Rfl:     Past Medical History:   Diagnosis Date    Exercises two times per week     to 3x/week    Irritable bowel syndrome     Lipoma     Seasonal allergies        Past Surgical History:   Procedure Laterality Date    NO PAST SURGERIES      IA EXC SKIN BENIG <0 5 CM TRUNK,ARM,LEG Right 6/16/2022    Procedure: CHEST WALL EXCISION;  Surgeon: Aaron Schwartz MD;  Location: [facility];  Service: General       Family History   Problem Relation Age of Onset    Aneurysm Father         cardiac    Heart disease Father         problem    Leukemia Brother         chronic lymphoid    Aneurysm Paternal Grandfather         cardiac     Throat cancer Family     Alcohol abuse Neg Hx     Substance Abuse Neg Hx     Mental illness Neg Hx         reports that she has never smoked  She has never used smokeless tobacco  She reports current alcohol use  She reports that she does not use drugs  There were no vitals filed for this visit  PHYSICAL EXAM  General: normal, cooperative, no distress  Incision: clean, dry, and intact and healing well    Some portions of this record may have been generated with voice recognition software  There may be translation, syntax,  or grammatical errors  Occasional wrong word or "sound-a-like" substitutions may have occurred due to the inherent limitations of the voice recognition software  Read the chart carefully and recognize, using context, where substitutions may have occurred  If you have any questions, please contact the dictating provider for clarification or correction, as needed  This encounter has been coded by a non-certified coder         Dieudonne Li MD    Date: 8/15/2022 Time: 12:00 PM

## 2022-08-15 NOTE — ASSESSMENT & PLAN NOTE
Lipoma removal on R side of chest on 6/16/22  2 5 incisional scar sp surgery  · Incisional scar healing well - no bleeding, pus or erythema noted around scar, tissue has closed well     · Pt uses vitamin E for scar improvement  · Continue to monitor for any acute symptoms

## 2022-10-14 ENCOUNTER — OFFICE VISIT (OUTPATIENT)
Dept: URGENT CARE | Facility: MEDICAL CENTER | Age: 27
End: 2022-10-14
Payer: COMMERCIAL

## 2022-10-14 VITALS
HEART RATE: 85 BPM | RESPIRATION RATE: 20 BRPM | DIASTOLIC BLOOD PRESSURE: 77 MMHG | SYSTOLIC BLOOD PRESSURE: 96 MMHG | WEIGHT: 132 LBS | OXYGEN SATURATION: 96 % | BODY MASS INDEX: 24.92 KG/M2 | HEIGHT: 61 IN | TEMPERATURE: 97.8 F

## 2022-10-14 DIAGNOSIS — J06.9 ACUTE URI: Primary | ICD-10-CM

## 2022-10-14 DIAGNOSIS — J02.9 SORE THROAT: ICD-10-CM

## 2022-10-14 LAB
S PYO AG THROAT QL: NEGATIVE
SARS-COV-2 AG UPPER RESP QL IA: NEGATIVE
SARS-COV-2 AG UPPER RESP QL IA: NEGATIVE
VALID CONTROL: NORMAL
VALID CONTROL: NORMAL

## 2022-10-14 PROCEDURE — 87811 SARS-COV-2 COVID19 W/OPTIC: CPT | Performed by: EMERGENCY MEDICINE

## 2022-10-14 PROCEDURE — G0382 LEV 3 HOSP TYPE B ED VISIT: HCPCS | Performed by: EMERGENCY MEDICINE

## 2022-10-14 PROCEDURE — 87880 STREP A ASSAY W/OPTIC: CPT | Performed by: EMERGENCY MEDICINE

## 2022-10-14 NOTE — PATIENT INSTRUCTIONS
Your evaluation suggests that your symptoms are most likely due to a viral illness, which will improve on its own with rest and fluids  Rapid strep and rapid covid were negative  We recommend you take 600mg ibuprofen every 6 hours or tylenol 650mg every 6 hours as needed for fever  If needed, you can alternate these medications so that you take one medication every 3 hours  For instance, at noon take ibuprofen, then at 3pm take tylenol, then at 6pm take ibuprofen  Over the counter allergy medication like Claritin, Allegra or Zyrtec can help with nasal congestion and post nasal drip  Over the counter steroid nasal sprays like Flonase can help with nasal congestion and post nasal drip as well  Delsym, an over the counter cough medication may be used every 12 hours as needed  Mucinex XR (guafenisen) 600 mg tablets may be used to thin out the mucous to make it easier to cough up  You may take 1-2 tablets twice per day as needed  Salt water gargles with 1 teaspoon of salt dissolved in 6-8 oz of water as needed can help with a sore throat  Please schedule an appointment for follow up with your primary care physician this week  Return to the Emergency Department if you experience worsening cough, fever 100 4 ° F or greater  that is not controlled by Tylenol or Ibuprofen, recurrent vomiting, chest pain, shortness of breath, or any other concerning symptoms

## 2022-10-14 NOTE — PROGRESS NOTES
[facility]        NAME: Kaylene Baltazar is a 32 y o  female  : 1995    MRN: [id number]  DATE: 2022  TIME: 11:21 AM    Assessment and Plan   Acute URI [J06 9]  1  Acute URI     2  Sore throat  POCT rapid strepA    Poct Covid 19 Rapid Antigen Test         Patient Instructions     Your evaluation suggests that your symptoms are most likely due to a viral illness, which will improve on its own with rest and fluids  Rapid strep and rapid covid were negative  We recommend you take 600mg ibuprofen every 6 hours or tylenol 650mg every 6 hours as needed for fever  If needed, you can alternate these medications so that you take one medication every 3 hours  For instance, at noon take ibuprofen, then at 3pm take tylenol, then at 6pm take ibuprofen  Over the counter allergy medication like Claritin, Allegra or Zyrtec can help with nasal congestion and post nasal drip  Over the counter steroid nasal sprays like Flonase can help with nasal congestion and post nasal drip as well  Delsym, an over the counter cough medication may be used every 12 hours as needed  Mucinex XR (guafenisen) 600 mg tablets may be used to thin out the mucous to make it easier to cough up  You may take 1-2 tablets twice per day as needed  Salt water gargles with 1 teaspoon of salt dissolved in 6-8 oz of water as needed can help with a sore throat  Please schedule an appointment for follow up with your primary care physician this week  Return to the Emergency Department if you experience worsening cough, fever 100 4 ° F or greater  that is not controlled by Tylenol or Ibuprofen, recurrent vomiting, chest pain, shortness of breath, or any other concerning symptoms  Follow up with PCP in 3-5 days  Proceed to  ER if symptoms worsen      Chief Complaint     Chief Complaint   Patient presents with   • Sore Throat     Patient states she started with a sore throat and L ear pain yesterday, she now had body aches, neck pain sinus pressure but not cough  No covid test done         History of Present Illness       75-year-old female presents today for evaluation of a sore throat and left ear pain which started yesterday  She reports sinus pressure but no cough  Review of Systems   Review of Systems   Constitutional: Negative for chills, fatigue and fever  HENT: Positive for congestion, ear pain and sore throat  Negative for postnasal drip and trouble swallowing  Respiratory: Negative for chest tightness and shortness of breath  Gastrointestinal: Negative for abdominal pain  Genitourinary: Negative for dysuria  Musculoskeletal: Negative for back pain  Skin: Negative for rash  Allergic/Immunologic: Negative for immunocompromised state  Neurological: Negative for dizziness, light-headedness and headaches  Psychiatric/Behavioral: Negative for confusion           Current Medications       Current Outpatient Medications:   •  Balcoltra 0 1-20 MG-MCG(21) TABS, in the morning, Disp: , Rfl:   •  Black Pepper-Turmeric (TURMERIC COMPLEX/BLACK PEPPER PO), Take by mouth With cayenne, Disp: , Rfl:   •  loratadine (CLARITIN) 10 mg tablet, Take 10 mg by mouth if needed, Disp: , Rfl:   •  Multiple Vitamin (multivitamin) tablet, Take 1 tablet by mouth daily, Disp: , Rfl:   •  Probiotic Product (PROBIOTIC DAILY PO), Take by mouth, Disp: , Rfl:   •  valACYclovir (VALTREX) 500 mg tablet, if needed Usually yearly, Disp: , Rfl:   •  fluticasone (FLONASE) 50 mcg/act nasal spray, 2 sprays into each nostril daily (Patient not taking: No sig reported), Disp: 16 g, Rfl: 0    Current Allergies     Allergies as of 10/14/2022 - Reviewed 10/14/2022   Allergen Reaction Noted   • Other Allergic Rhinitis 10/23/2017            The following portions of the patient's history were reviewed and updated as appropriate: allergies, current medications, past family history, past medical history, past social history, past surgical history and problem list      Past Medical History:   Diagnosis Date   • Exercises two times per week     to 3x/week   • Irritable bowel syndrome    • Lipoma    • Seasonal allergies        Past Surgical History:   Procedure Laterality Date   • NO PAST SURGERIES     • OK EXC SKIN BENIG <0 5 CM TRUNK,ARM,LEG Right 6/16/2022    Procedure: CHEST WALL EXCISION;  Surgeon: Shanelle Bhakta MD;  Location: [facility] OR;  Service: General       Family History   Problem Relation Age of Onset   • Aneurysm Father         cardiac   • Heart disease Father         problem   • Leukemia Brother         chronic lymphoid   • Aneurysm Paternal Grandfather         cardiac    • Throat cancer Family    • Alcohol abuse Neg Hx    • Substance Abuse Neg Hx    • Mental illness Neg Hx          Medications have been verified  Objective   BP 96/77   Pulse 85   Temp 97 8 °F (36 6 °C)   Resp 20   Ht 5' 1" (1 549 m)   Wt 59 9 kg (132 lb)   LMP 10/10/2022   SpO2 96%   BMI 24 94 kg/m²        Physical Exam     Physical Exam  Vitals and nursing note reviewed  Constitutional:       Appearance: Normal appearance  HENT:      Head: Normocephalic and atraumatic  Right Ear: Tympanic membrane, ear canal and external ear normal       Left Ear: Tympanic membrane, ear canal and external ear normal       Nose: Nose normal       Mouth/Throat:      Mouth: Mucous membranes are moist    Eyes:      Extraocular Movements: Extraocular movements intact  Pupils: Pupils are equal, round, and reactive to light  Cardiovascular:      Rate and Rhythm: Normal rate and regular rhythm  Pulmonary:      Effort: Pulmonary effort is normal       Breath sounds: Normal breath sounds  Skin:     General: Skin is warm and dry  Capillary Refill: Capillary refill takes less than 2 seconds  Neurological:      General: No focal deficit present  Mental Status: She is alert and oriented to person, place, and time     Psychiatric:         Mood and Affect: Mood normal          Behavior: Behavior normal

## 2022-11-18 NOTE — TELEPHONE ENCOUNTER
CC:   Postpartum day #1 s/p vaginal delivery    Subjective  Patient doing well this morning. Tolerating regular diet. Pain well controlled. Lochia mild. Ambulating and voiding without difficulty    Objective  Visit Vitals  /70 (BP Location: LUE - Left upper extremity, Patient Position: Semi-Loomis's)   Pulse 93   Temp 98.1 °F (36.7 °C) (Temporal)   Resp 15   Ht 5' 3\" (1.6 m)   Wt 108.4 kg (238 lb 15.7 oz)   LMP 02/18/2022   SpO2 94%   Breastfeeding Yes   BMI 42.33 kg/m²     General: no apparent distress  Psych: alert and oriented x3  Abdomen:  Soft, non-distended, no rebound tenderness or guarding.  Fundus firm, below the umbilicus.  Extremities:  Calves non-tender    Labs  Admission on 11/17/2022   Component Date Value   • Rapid SARS-COV-2 by PCR 11/17/2022 Not Detected    • Isolation Guidelines 11/17/2022     • Procedural Comment 11/17/2022     • ABO/RH(D) 11/17/2022 O Rh Positive    • ANTIBODY SCREEN 11/17/2022 Negative    • TYPE AND SCREEN EXPIRATI* 11/17/2022 11/20/2022 23:59    • WBC 11/17/2022 13.4 (A)   • RBC 11/17/2022 4.66    • HGB 11/17/2022 13.4    • HCT 11/17/2022 40.1    • MCV 11/17/2022 86.1    • MCH 11/17/2022 28.8    • MCHC 11/17/2022 33.4    • RDW-CV 11/17/2022 14.8    • RDW-SD 11/17/2022 46.2    • PLT 11/17/2022 374    • NRBC 11/17/2022 0    • Neutrophil, Percent 11/17/2022 77    • Lymphocytes, Percent 11/17/2022 18    • Mono, Percent 11/17/2022 4    • Eosinophils, Percent 11/17/2022 1    • Basophils, Percent 11/17/2022 0    • Immature Granulocytes 11/17/2022 0    • Absolute Neutrophils 11/17/2022 10.2 (A)   • Absolute Lymphocytes 11/17/2022 2.5    • Absolute Monocytes 11/17/2022 0.6    • Absolute Eosinophils  11/17/2022 0.1    • Absolute Basophils 11/17/2022 0.0    • Absolute Immmature Granu* 11/17/2022 0.1    • HGB 11/18/2022 12.3    • HCT 11/18/2022 36.5      No results found for: ABO  No results found for: RH     Assessment  Post-partum day #1 s/p vaginal delivery    Plan  Continue  Patient was told dx code on referral was wrong  Patient was told she has an outer ear infection not inner   DX was corrected in chart routine post partum care  Continue prn pain meds  Discharge instructions reviewed  Discharge home today

## 2022-12-16 ENCOUNTER — OFFICE VISIT (OUTPATIENT)
Dept: URGENT CARE | Facility: MEDICAL CENTER | Age: 27
End: 2022-12-16

## 2022-12-16 VITALS
HEART RATE: 75 BPM | OXYGEN SATURATION: 98 % | SYSTOLIC BLOOD PRESSURE: 118 MMHG | DIASTOLIC BLOOD PRESSURE: 64 MMHG | RESPIRATION RATE: 18 BRPM | TEMPERATURE: 98.8 F

## 2022-12-16 DIAGNOSIS — J01.90 ACUTE VIRAL SINUSITIS: Primary | ICD-10-CM

## 2022-12-16 DIAGNOSIS — B97.89 ACUTE VIRAL SINUSITIS: Primary | ICD-10-CM

## 2022-12-16 RX ORDER — CETIRIZINE HYDROCHLORIDE 10 MG/1
10 TABLET ORAL DAILY
Qty: 10 TABLET | Refills: 0 | Status: SHIPPED | OUTPATIENT
Start: 2022-12-16 | End: 2022-12-26

## 2022-12-16 RX ORDER — FLUTICASONE PROPIONATE 50 MCG
1 SPRAY, SUSPENSION (ML) NASAL 2 TIMES DAILY
Qty: 11.1 ML | Refills: 0 | Status: SHIPPED | OUTPATIENT
Start: 2022-12-16

## 2022-12-16 RX ORDER — BENZONATATE 100 MG/1
100 CAPSULE ORAL 3 TIMES DAILY PRN
Qty: 20 CAPSULE | Refills: 0 | Status: SHIPPED | OUTPATIENT
Start: 2022-12-16

## 2022-12-16 RX ORDER — DEXTROMETHORPHAN HYDROBROMIDE AND PROMETHAZINE HYDROCHLORIDE 15; 6.25 MG/5ML; MG/5ML
SYRUP ORAL
COMMUNITY
Start: 2022-12-12

## 2022-12-16 RX ORDER — PSEUDOEPHEDRINE HCL 120 MG/1
120 TABLET, FILM COATED, EXTENDED RELEASE ORAL EVERY 12 HOURS
Qty: 20 TABLET | Refills: 0 | Status: SHIPPED | OUTPATIENT
Start: 2022-12-16 | End: 2022-12-26

## 2022-12-16 RX ORDER — GUAIFENESIN 100 MG/5ML
200 SYRUP ORAL 3 TIMES DAILY PRN
Qty: 120 ML | Refills: 0 | Status: SHIPPED | OUTPATIENT
Start: 2022-12-16

## 2022-12-16 RX ORDER — PSEUDOEPHEDRINE HCL 120 MG
TABLET, EXTENDED RELEASE ORAL
COMMUNITY
Start: 2022-12-12 | End: 2022-12-16

## 2022-12-16 RX ORDER — METHYLPREDNISOLONE 4 MG/1
TABLET ORAL
COMMUNITY
Start: 2022-12-12

## 2022-12-17 NOTE — PROGRESS NOTES
[facility]        NAME: Nette Suazo is a 32 y o  female  : 1995    MRN: [id number]  DATE: 2022  TIME: 8:29 PM    Assessment and Orders   Acute viral sinusitis [J01 90, B97 89]  1  Acute viral sinusitis  guaiFENesin (ROBITUSSIN) 100 MG/5ML oral liquid    fluticasone (FLONASE) 50 mcg/act nasal spray    pseudoephedrine (SUDAFED) 120 MG 12 hr tablet    benzonatate (TESSALON PERLES) 100 mg capsule    cetirizine (ZyrTEC) 10 mg tablet            Plan and Discussion      Symptoms and exam consistent with acute viral sinusitis  Sudafed, Robitussin, Flonase, Zyrtec and tessalon perles sent to pharmacy for symptom relief  She can continue Promethazine-dextromethorphan as prescribed by other urgent care if this is helping her  AA[employer] Article from - Clinical Diagnosis of Acute Bacterial Rhinosinusitis  GURPREET MADDOX, [facility], [facility], AND HOLLI WOODALL MD, [facility] Research [address] [facility] in [facility]     Acute rhinosinusitis in adults is defined as sinonasal inflammation that lasts less than four weeks and is associated with the sudden onset of symptoms  1 In the 2012 [facility], 12% of respondents reported being diagnosed with rhinosinusitis in the previous 12 months  2 In 2016, there were 8 million [facility]  ambulatory visits for acute sinusitis  3 Acute bacterial rhinosinusitis develops in only 0 5% to 2% of all upper respiratory tract infections  4  A 2018 [facility] review demonstrated that the potential benefit of antibiotics for the treatment of acute rhinosinusitis, diagnosed clinically or confirmed with imaging, is marginal 5 Without antibiotics, rhinosinusitis resolved in 46% of patients after one week and in 64% of patients after 14 days  5 Antibiotics can shorten time to resolution but in only five to 11 more people per 100 compared with placebo or no treatment  Risks and benefits discussed   Patient understands and agrees with the plan      Follow up with PCP  Chief Complaint     Chief Complaint   Patient presents with   • Nasal Congestion     Patient c/o nasal congestion with pressure, headache and cough x 6 days  Patient reports being seen at a different urgent care and completing methylprednisolone without improvement  History of Present Illness       Sinusitis  This is a new problem  The current episode started in the past 7 days  There has been no fever  Associated symptoms include congestion, coughing, headaches and sinus pressure  Pertinent negatives include no ear pain or sore throat  Past treatments include oral decongestants and spray decongestants  Review of Systems   Review of Systems   HENT: Positive for congestion and sinus pressure  Negative for ear pain and sore throat  Respiratory: Positive for cough  Neurological: Positive for headaches           Current Medications       Current Outpatient Medications:   •  benzonatate (TESSALON PERLES) 100 mg capsule, Take 1 capsule (100 mg total) by mouth 3 (three) times a day as needed for cough, Disp: 20 capsule, Rfl: 0  •  cetirizine (ZyrTEC) 10 mg tablet, Take 1 tablet (10 mg total) by mouth daily for 10 days, Disp: 10 tablet, Rfl: 0  •  fluticasone (FLONASE) 50 mcg/act nasal spray, 1 spray into each nostril 2 (two) times a day, Disp: 11 1 mL, Rfl: 0  •  guaiFENesin (ROBITUSSIN) 100 MG/5ML oral liquid, Take 10 mL (200 mg total) by mouth 3 (three) times a day as needed for cough, Disp: 120 mL, Rfl: 0  •  pseudoephedrine (SUDAFED) 120 MG 12 hr tablet, Take 1 tablet (120 mg total) by mouth every 12 (twelve) hours for 10 days, Disp: 20 tablet, Rfl: 0  •  Balcoltra 0 1-20 MG-MCG(21) TABS, in the morning, Disp: , Rfl:   •  Black Pepper-Turmeric (TURMERIC COMPLEX/BLACK PEPPER PO), Take by mouth With cayenne, Disp: , Rfl:   •  fluticasone (FLONASE) 50 mcg/act nasal spray, 2 sprays into each nostril daily (Patient not taking: No sig reported), Disp: 16 g, Rfl: 0  • loratadine (CLARITIN) 10 mg tablet, Take 10 mg by mouth if needed, Disp: , Rfl:   •  methylPREDNISolone 4 MG tablet therapy pack, TAKE (ORAL) 1 TIME PER DAY FOR 6 DAYS 6,5,4,3,2,1, Disp: , Rfl:   •  Multiple Vitamin (multivitamin) tablet, Take 1 tablet by mouth daily, Disp: , Rfl:   •  Probiotic Product (PROBIOTIC DAILY PO), Take by mouth, Disp: , Rfl:   •  promethazine-dextromethorphan (PHENERGAN-DM) 6 25-15 mg/5 mL oral syrup, TAKE 5 ML (ORAL) 4 TIMES PER DAY AS NEEDED FOR COUGH FOR 7 DAYS, Disp: , Rfl:   •  valACYclovir (VALTREX) 500 mg tablet, if needed Usually yearly, Disp: , Rfl:     Current Allergies     Allergies as of 12/16/2022 - Reviewed 12/16/2022   Allergen Reaction Noted   • Other Allergic Rhinitis 10/23/2017            The following portions of the patient's history were reviewed and updated as appropriate: allergies, current medications, past family history, past medical history, past social history, past surgical history and problem list      Past Medical History:   Diagnosis Date   • Exercises two times per week     to 3x/week   • Irritable bowel syndrome    • Lipoma    • Seasonal allergies        Past Surgical History:   Procedure Laterality Date   • NO PAST SURGERIES     • IA EXC SKIN BENIG <0 5 CM TRUNK,ARM,LEG Right 6/16/2022    Procedure: CHEST WALL EXCISION;  Surgeon: Raheel Castle MD;  Location: [facility];  Service: General       Family History   Problem Relation Age of Onset   • Aneurysm Father         cardiac   • Heart disease Father         problem   • Leukemia Brother         chronic lymphoid   • Aneurysm Paternal Grandfather         cardiac    • Throat cancer Family    • Alcohol abuse Neg Hx    • Substance Abuse Neg Hx    • Mental illness Neg Hx          Medications have been verified  Objective   /64   Pulse 75   Temp 98 8 °F (37 1 °C)   Resp 18   SpO2 98%   No LMP recorded         Physical Exam     Physical Exam  Constitutional:       General: She is not in acute distress  Appearance: She is not ill-appearing or toxic-appearing  HENT:      Head: Normocephalic and atraumatic  Right Ear: Tympanic membrane and external ear normal       Left Ear: Tympanic membrane and external ear normal       Nose: Congestion present  Comments: Boggy nasal turbinates     Mouth/Throat:      Pharynx: Posterior oropharyngeal erythema present  Comments: Cobblestone appearance in posterior pharynx  Cardiovascular:      Rate and Rhythm: Normal rate and regular rhythm  Pulmonary:      Effort: Pulmonary effort is normal  No respiratory distress  Breath sounds: No wheezing  Neurological:      General: No focal deficit present  Mental Status: She is alert and oriented to person, place, and time  Psychiatric:         Mood and Affect: Mood normal          Behavior: Behavior normal          Thought Content:  Thought content normal          Judgment: Judgment normal                Bri Almonte DO

## 2023-05-18 ENCOUNTER — APPOINTMENT (OUTPATIENT)
Dept: LAB | Facility: CLINIC | Age: 28
End: 2023-05-18

## 2023-05-18 DIAGNOSIS — E03.9 HYPOTHYROIDISM, ADULT: ICD-10-CM

## 2023-05-18 DIAGNOSIS — N95.1 SYMPTOMATIC MENOPAUSAL OR FEMALE CLIMACTERIC STATES: ICD-10-CM

## 2023-05-18 DIAGNOSIS — D51.9 ANEMIA DUE TO VITAMIN B12 DEFICIENCY, UNSPECIFIED B12 DEFICIENCY TYPE: ICD-10-CM

## 2023-05-18 DIAGNOSIS — R53.83 OTHER FATIGUE: ICD-10-CM

## 2023-05-18 DIAGNOSIS — E55.9 VITAMIN D DEFICIENCY: ICD-10-CM

## 2023-05-18 DIAGNOSIS — E13.69 OTHER SPECIFIED DIABETES MELLITUS WITH OTHER SPECIFIED COMPLICATION, UNSPECIFIED WHETHER LONG TERM INSULIN USE (HCC): ICD-10-CM

## 2023-05-18 DIAGNOSIS — E78.00 ELEVATED CHOLESTEROL: ICD-10-CM

## 2023-05-18 LAB
25(OH)D3 SERPL-MCNC: 23.9 NG/ML (ref 30–100)
ALBUMIN SERPL BCP-MCNC: 3.9 G/DL (ref 3.5–5)
ALP SERPL-CCNC: 45 U/L (ref 46–116)
ALT SERPL W P-5'-P-CCNC: 33 U/L (ref 12–78)
ANION GAP SERPL CALCULATED.3IONS-SCNC: 1 MMOL/L (ref 4–13)
AST SERPL W P-5'-P-CCNC: 19 U/L (ref 5–45)
BASOPHILS # BLD AUTO: 0.04 THOUSANDS/ÂΜL (ref 0–0.1)
BASOPHILS NFR BLD AUTO: 1 % (ref 0–1)
BILIRUB SERPL-MCNC: 0.36 MG/DL (ref 0.2–1)
BUN SERPL-MCNC: 15 MG/DL (ref 5–25)
CALCIUM SERPL-MCNC: 9.9 MG/DL (ref 8.3–10.1)
CHLORIDE SERPL-SCNC: 112 MMOL/L (ref 96–108)
CHOLEST SERPL-MCNC: 202 MG/DL
CO2 SERPL-SCNC: 24 MMOL/L (ref 21–32)
CREAT SERPL-MCNC: 0.74 MG/DL (ref 0.6–1.3)
EOSINOPHIL # BLD AUTO: 0.12 THOUSAND/ÂΜL (ref 0–0.61)
EOSINOPHIL NFR BLD AUTO: 2 % (ref 0–6)
ERYTHROCYTE [DISTWIDTH] IN BLOOD BY AUTOMATED COUNT: 12.3 % (ref 11.6–15.1)
FOLATE SERPL-MCNC: 19.7 NG/ML
GFR SERPL CREATININE-BSD FRML MDRD: 109 ML/MIN/1.73SQ M
GLUCOSE P FAST SERPL-MCNC: 91 MG/DL (ref 65–99)
HCT VFR BLD AUTO: 40 % (ref 34.8–46.1)
HDLC SERPL-MCNC: 49 MG/DL
HGB BLD-MCNC: 13.5 G/DL (ref 11.5–15.4)
IMM GRANULOCYTES # BLD AUTO: 0.02 THOUSAND/UL (ref 0–0.2)
IMM GRANULOCYTES NFR BLD AUTO: 0 % (ref 0–2)
LDLC SERPL CALC-MCNC: 133 MG/DL (ref 0–100)
LH SERPL-ACNC: 1.5 MIU/ML
LYMPHOCYTES # BLD AUTO: 1.9 THOUSANDS/ÂΜL (ref 0.6–4.47)
LYMPHOCYTES NFR BLD AUTO: 28 % (ref 14–44)
MCH RBC QN AUTO: 30.1 PG (ref 26.8–34.3)
MCHC RBC AUTO-ENTMCNC: 33.8 G/DL (ref 31.4–37.4)
MCV RBC AUTO: 89 FL (ref 82–98)
MONOCYTES # BLD AUTO: 0.37 THOUSAND/ÂΜL (ref 0.17–1.22)
MONOCYTES NFR BLD AUTO: 5 % (ref 4–12)
NEUTROPHILS # BLD AUTO: 4.42 THOUSANDS/ÂΜL (ref 1.85–7.62)
NEUTS SEG NFR BLD AUTO: 64 % (ref 43–75)
NONHDLC SERPL-MCNC: 153 MG/DL
NRBC BLD AUTO-RTO: 0 /100 WBCS
PLATELET # BLD AUTO: 283 THOUSANDS/UL (ref 149–390)
PMV BLD AUTO: 10 FL (ref 8.9–12.7)
POTASSIUM SERPL-SCNC: 4.1 MMOL/L (ref 3.5–5.3)
PROT SERPL-MCNC: 7.8 G/DL (ref 6.4–8.4)
RBC # BLD AUTO: 4.48 MILLION/UL (ref 3.81–5.12)
SODIUM SERPL-SCNC: 137 MMOL/L (ref 135–147)
T4 FREE SERPL-MCNC: 0.96 NG/DL (ref 0.61–1.12)
TRIGL SERPL-MCNC: 99 MG/DL
TSH SERPL DL<=0.05 MIU/L-ACNC: 0.98 UIU/ML (ref 0.45–4.5)
VIT B12 SERPL-MCNC: 741 PG/ML (ref 180–914)
WBC # BLD AUTO: 6.87 THOUSAND/UL (ref 4.31–10.16)

## 2023-05-19 LAB — ESTRADIOL SERPL-MCNC: 117 PG/ML

## 2023-05-22 ENCOUNTER — APPOINTMENT (OUTPATIENT)
Dept: LAB | Facility: CLINIC | Age: 28
End: 2023-05-22

## 2023-05-22 DIAGNOSIS — Z32.00 PREGNANCY EXAMINATION OR TEST, PREGNANCY UNCONFIRMED: ICD-10-CM

## 2023-05-22 LAB — B-HCG SERPL-ACNC: <1 MIU/ML (ref 0–5)

## 2023-09-25 ENCOUNTER — OFFICE VISIT (OUTPATIENT)
Dept: URGENT CARE | Facility: MEDICAL CENTER | Age: 28
End: 2023-09-25
Payer: COMMERCIAL

## 2023-09-25 VITALS
RESPIRATION RATE: 18 BRPM | TEMPERATURE: 97.9 F | DIASTOLIC BLOOD PRESSURE: 60 MMHG | HEART RATE: 82 BPM | OXYGEN SATURATION: 97 % | SYSTOLIC BLOOD PRESSURE: 110 MMHG

## 2023-09-25 DIAGNOSIS — R05.9 COUGH, UNSPECIFIED TYPE: ICD-10-CM

## 2023-09-25 DIAGNOSIS — J03.90 ACUTE TONSILLITIS, UNSPECIFIED ETIOLOGY: ICD-10-CM

## 2023-09-25 DIAGNOSIS — J02.9 SORE THROAT: Primary | ICD-10-CM

## 2023-09-25 LAB
S PYO AG THROAT QL: NEGATIVE
SARS-COV-2 AG UPPER RESP QL IA: NEGATIVE
VALID CONTROL: NORMAL

## 2023-09-25 PROCEDURE — 87070 CULTURE OTHR SPECIMN AEROBIC: CPT | Performed by: FAMILY MEDICINE

## 2023-09-25 PROCEDURE — 87811 SARS-COV-2 COVID19 W/OPTIC: CPT | Performed by: FAMILY MEDICINE

## 2023-09-25 PROCEDURE — 87880 STREP A ASSAY W/OPTIC: CPT | Performed by: FAMILY MEDICINE

## 2023-09-25 PROCEDURE — G0381 LEV 2 HOSP TYPE B ED VISIT: HCPCS | Performed by: FAMILY MEDICINE

## 2023-09-25 RX ORDER — AMOXICILLIN AND CLAVULANATE POTASSIUM 875; 125 MG/1; MG/1
1 TABLET, FILM COATED ORAL EVERY 12 HOURS SCHEDULED
Qty: 14 TABLET | Refills: 0 | Status: SHIPPED | OUTPATIENT
Start: 2023-09-25 | End: 2023-09-25 | Stop reason: SDUPTHER

## 2023-09-25 RX ORDER — AMOXICILLIN AND CLAVULANATE POTASSIUM 875; 125 MG/1; MG/1
1 TABLET, FILM COATED ORAL EVERY 12 HOURS SCHEDULED
Qty: 14 TABLET | Refills: 0 | Status: SHIPPED | OUTPATIENT
Start: 2023-09-25 | End: 2023-10-02

## 2023-09-25 NOTE — LETTER
September 25, 2023     Patient: Ailyn Ruff   YOB: 1995   Date of Visit: 9/25/2023       To Whom It May Concern: It is my medical opinion that Rere Wade may return to work on 9/27/2023 . If you have any questions or concerns, please don't hesitate to call.          Sincerely,        Isela Weldon MD    CC: No Recipients
04-May-2018 11:55

## 2023-09-26 NOTE — PATIENT INSTRUCTIONS
Rapid strep test-negative throat culture performed. Patient has markedly enlarged tonsils and was started empirically on Augmentin 875 mg twice a day for 7 days. Advised patient to start Flonase nasal spray, which she has at home-2 sprays in each nostril once daily. Also recommend to start DayQuil and NyQuil for other cold symptoms. Given work excuse for today and tomorrow. Tonsillitis   WHAT YOU NEED TO KNOW:   Tonsillitis is inflammation of your tonsils. Tonsils are the lumps of tissue on both sides of the back of your throat. Tonsils are part of your immune system. They help you fight infections. Tonsillitis may be caused by a bacterial or a viral infection. Recurrent tonsillitis is tonsillitis that happens at least 5 times in 1 year. Chronic tonsillitis lasts 3 months or longer. DISCHARGE INSTRUCTIONS:   Call your local emergency number ([phone] in the [address]) if:   You have trouble breathing because your tonsils are swollen. Return to the emergency department if:   You are not able to eat or drink because of the pain. Your voice changes. You have increased swelling or jaw pain, or you have trouble opening your mouth. You have a stiff neck. You have not urinated in 12 hours or are very weak or tired. You have trouble sleeping and wake up trying to catch your breath. Call your doctor if:   You have a fever. Your pain gets worse or does not get better after you take pain medicine. Your sore throat is not better after you have finished antibiotic treatment. You have questions or concerns about your condition or care. Medicines: You may need any of the following:  Acetaminophen  decreases pain and fever. It is available without a doctor's order. Ask how much to take and how often to take it. Follow directions.  Read the labels of all other medicines you are using to see if they also contain acetaminophen, or ask your doctor or pharmacist. Acetaminophen can cause liver damage if not taken correctly. NSAIDs , such as ibuprofen, help decrease swelling, pain, and fever. This medicine is available with or without a doctor's order. NSAIDs can cause stomach bleeding or kidney problems in certain people. If you take blood thinner medicine, always ask your healthcare provider if NSAIDs are safe for you. Always read the medicine label and follow directions. Antibiotics  help treat a bacterial infection. Steroids  may be given for a short time to relieve swelling. Take your medicine as directed. Contact your healthcare provider if you think your medicine is not helping or if you have side effects. Tell your provider if you are allergic to any medicine. Keep a list of the medicines, vitamins, and herbs you take. Include the amounts, and when and why you take them. Bring the list or the pill bottles to follow-up visits. Carry your medicine list with you in case of an emergency. Rest when you feel it is needed:  Slowly start to do more each day. Drink liquids as directed: You may need to drink more liquid than usual to help prevent dehydration. Ask how much liquid to drink each day and which liquids are best for you. Gargle with warm salt water: This may help decrease throat pain. Mix 1 teaspoon of salt in 8 ounces of warm water. Ask how often you should do this. Prevent tonsillitis:  Bacteria and viruses that lead to tonsillitis can spread through coughing, sneezing, or touching. The following can help prevent infections:  Wash your hands often. Wash your hands several times each day. Wash after you use the bathroom, change a child's diaper, and before you prepare or eat food. Use soap and water. Rinse with warm, running water for several seconds. Then dry your hands with a clean towel or paper towel. Use hand  that contains alcohol if soap and water are not available.  Do not touch your eyes, nose, or mouth without washing your hands first.         Cover a sneeze or cough.  Use a tissue that covers your mouth and nose. Throw the tissue away immediately. If you do not have a tissue, use the bend of your elbow. Then wash your hands well or use a hand . Prevent person-to-person spread of germs. Do not share food or drinks with anyone. Return to work, school, or other activities as directed. Your provider may want you to wait until your fever is gone for at least 24 hours. Ask about vaccines you may need. Vaccines help protect you from some bacterial and viral infections. Get the influenza (flu) vaccine as soon as recommended each year, usually in September or October. Get a COVID-19 vaccine and recommended boosters. Get a pneumonia vaccine, if recommended. The pneumonia vaccine is usually recommended every 5 years. Your provider will tell you which other vaccines you need, and when to get them. Follow up with your doctor as directed:  Write down your questions so you remember to ask them during your visits. © Copyright [employer] 2023 Information is for End User's use only and may not be sold, redistributed or otherwise used for commercial purposes. The above information is an  only. It is not intended as medical advice for individual conditions or treatments. Talk to your doctor, nurse or pharmacist before following any medical regimen to see if it is safe and effective for you.

## 2023-09-26 NOTE — PROGRESS NOTES
[facility]        NAME: Gonzalo Martinez is a 25 y.o. female  : 1995    MRN: [id number]  DATE: 2023  TIME: 9:54 PM    Assessment and Plan   Sore throat [J02.9]  1. Sore throat  POCT rapid strepA      2. Acute tonsillitis, unspecified etiology  Throat culture    amoxicillin-clavulanate (AUGMENTIN) 875-125 mg per tablet    DISCONTINUED: amoxicillin-clavulanate (AUGMENTIN) 875-125 mg per tablet      3. Cough, unspecified type  Poct Covid 19 Rapid Antigen Test            Patient Instructions       Follow up with PCP in 3-5 days. Proceed to  ER if symptoms worsen. Chief Complaint     Chief Complaint   Patient presents with   • Sore Throat     Started with a sore throat yesterday. Congestion, runny nose and body aches. Feels stuffy in the head and ears are uncomfortable. Denies fever. Feels really hot at times. History of Present Illness       56-year-old female here today with acute onset of sore throat and body aches that began last night. Also complaining nasal congestion significant postnasal drip. Severe sore throat. She is taking cough drops with no significant improvement. Describes nonproductive cough but denies shortness of breath. Denies nausea, vomiting, diarrhea. Denies any known sick contact. Review of Systems   Review of Systems   Constitutional: Positive for fever. HENT: Positive for congestion, postnasal drip and sore throat. Respiratory: Positive for cough. Musculoskeletal: Positive for arthralgias and myalgias.          Current Medications       Current Outpatient Medications:   •  amoxicillin-clavulanate (AUGMENTIN) 875-125 mg per tablet, Take 1 tablet by mouth every 12 (twelve) hours for 7 days, Disp: 14 tablet, Rfl: 0  •  Balcoltra 0.1-20 MG-MCG(21) TABS, in the morning, Disp: , Rfl:   •  benzonatate (TESSALON PERLES) 100 mg capsule, Take 1 capsule (100 mg total) by mouth 3 (three) times a day as needed for cough, Disp: 20 capsule, Rfl: 0  •  Black Pepper-Turmeric (TURMERIC COMPLEX/BLACK PEPPER PO), Take by mouth With cayenne, Disp: , Rfl:   •  cetirizine (ZyrTEC) 10 mg tablet, Take 1 tablet (10 mg total) by mouth daily for 10 days, Disp: 10 tablet, Rfl: 0  •  fluticasone (FLONASE) 50 mcg/act nasal spray, 2 sprays into each nostril daily (Patient not taking: No sig reported), Disp: 16 g, Rfl: 0  •  fluticasone (FLONASE) 50 mcg/act nasal spray, 1 spray into each nostril 2 (two) times a day, Disp: 11.1 mL, Rfl: 0  •  guaiFENesin (ROBITUSSIN) 100 MG/5ML oral liquid, Take 10 mL (200 mg total) by mouth 3 (three) times a day as needed for cough, Disp: 120 mL, Rfl: 0  •  loratadine (CLARITIN) 10 mg tablet, Take 10 mg by mouth if needed, Disp: , Rfl:   •  methylPREDNISolone 4 MG tablet therapy pack, TAKE (ORAL) 1 TIME PER DAY FOR 6 DAYS 6,5,4,3,2,1, Disp: , Rfl:   •  Multiple Vitamin (multivitamin) tablet, Take 1 tablet by mouth daily, Disp: , Rfl:   •  Probiotic Product (PROBIOTIC DAILY PO), Take by mouth, Disp: , Rfl:   •  promethazine-dextromethorphan (PHENERGAN-DM) 6.25-15 mg/5 mL oral syrup, TAKE 5 ML (ORAL) 4 TIMES PER DAY AS NEEDED FOR COUGH FOR 7 DAYS, Disp: , Rfl:   •  pseudoephedrine (SUDAFED) 120 MG 12 hr tablet, Take 1 tablet (120 mg total) by mouth every 12 (twelve) hours for 10 days, Disp: 20 tablet, Rfl: 0  •  valACYclovir (VALTREX) 500 mg tablet, if needed Usually yearly, Disp: , Rfl:     Current Allergies     Allergies as of 09/25/2023 - Reviewed 09/25/2023   Allergen Reaction Noted   • Other Allergic Rhinitis 10/23/2017            The following portions of the patient's history were reviewed and updated as appropriate: allergies, current medications, past family history, past medical history, past social history, past surgical history and problem list.     Past Medical History:   Diagnosis Date   • Exercises two times per week     to 3x/week   • Irritable bowel syndrome    • Lipoma    • Seasonal allergies        Past Surgical History: Procedure Laterality Date   • NO PAST SURGERIES     • IN EXC SKIN BENIG <0.5 CM TRUNK,ARM,LEG Right 6/16/2022    Procedure: CHEST WALL EXCISION;  Surgeon: Yudy Salas MD;  Location: [facility];  Service: General       Family History   Problem Relation Age of Onset   • Aneurysm Father         cardiac   • Heart disease Father         problem   • Leukemia Brother         chronic lymphoid   • Aneurysm Paternal Grandfather         cardiac    • Throat cancer Family    • Alcohol abuse Neg Hx    • Substance Abuse Neg Hx    • Mental illness Neg Hx          Medications have been verified. Objective   /60 (BP Location: Left arm, Patient Position: Sitting)   Pulse 82   Temp 97.9 °F (36.6 °C)   Resp 18   SpO2 97%   No LMP recorded. Physical Exam     Physical Exam  Vitals and nursing note reviewed. Constitutional:       Appearance: She is well-developed. HENT:      Right Ear: Tympanic membrane normal.      Left Ear: Tympanic membrane normal.      Nose:      Comments: Markedly hypertrophic hyperemic right turbinate. Mouth/Throat: Tonsils: No tonsillar exudate. 4+ on the left. Comments: Posterior pharynx erythematous, significant cobblestoning observed. Cardiovascular:      Rate and Rhythm: Normal rate. Pulmonary:      Effort: Pulmonary effort is normal.      Breath sounds: Normal breath sounds. Lymphadenopathy:      Cervical: Cervical adenopathy present. Neurological:      Mental Status: She is alert.

## 2023-09-27 LAB — BACTERIA THROAT CULT: NORMAL

## 2023-10-04 ENCOUNTER — APPOINTMENT (OUTPATIENT)
Dept: RADIOLOGY | Facility: MEDICAL CENTER | Age: 28
End: 2023-10-04
Payer: COMMERCIAL

## 2023-10-04 DIAGNOSIS — J18.9 UNRESOLVED PNEUMONIA: ICD-10-CM

## 2023-10-04 PROCEDURE — 71046 X-RAY EXAM CHEST 2 VIEWS: CPT

## 2023-10-19 ENCOUNTER — APPOINTMENT (OUTPATIENT)
Dept: LAB | Facility: CLINIC | Age: 28
End: 2023-10-19
Payer: COMMERCIAL

## 2023-10-19 DIAGNOSIS — R53.83 FATIGUE, UNSPECIFIED TYPE: ICD-10-CM

## 2023-10-19 DIAGNOSIS — M25.50 PAIN IN JOINT, MULTIPLE SITES: ICD-10-CM

## 2023-10-19 LAB
BASOPHILS # BLD AUTO: 0.05 THOUSANDS/ÂΜL (ref 0–0.1)
BASOPHILS NFR BLD AUTO: 1 % (ref 0–1)
EOSINOPHIL # BLD AUTO: 0.16 THOUSAND/ÂΜL (ref 0–0.61)
EOSINOPHIL NFR BLD AUTO: 2 % (ref 0–6)
ERYTHROCYTE [DISTWIDTH] IN BLOOD BY AUTOMATED COUNT: 12.8 % (ref 11.6–15.1)
ERYTHROCYTE [SEDIMENTATION RATE] IN BLOOD: 6 MM/HOUR (ref 0–19)
HCT VFR BLD AUTO: 40.9 % (ref 34.8–46.1)
HGB BLD-MCNC: 13.4 G/DL (ref 11.5–15.4)
IMM GRANULOCYTES # BLD AUTO: 0.05 THOUSAND/UL (ref 0–0.2)
IMM GRANULOCYTES NFR BLD AUTO: 1 % (ref 0–2)
LYMPHOCYTES # BLD AUTO: 2.75 THOUSANDS/ÂΜL (ref 0.6–4.47)
LYMPHOCYTES NFR BLD AUTO: 28 % (ref 14–44)
MCH RBC QN AUTO: 29.3 PG (ref 26.8–34.3)
MCHC RBC AUTO-ENTMCNC: 32.8 G/DL (ref 31.4–37.4)
MCV RBC AUTO: 90 FL (ref 82–98)
MONOCYTES # BLD AUTO: 0.56 THOUSAND/ÂΜL (ref 0.17–1.22)
MONOCYTES NFR BLD AUTO: 6 % (ref 4–12)
NEUTROPHILS # BLD AUTO: 6.3 THOUSANDS/ÂΜL (ref 1.85–7.62)
NEUTS SEG NFR BLD AUTO: 62 % (ref 43–75)
NRBC BLD AUTO-RTO: 0 /100 WBCS
PLATELET # BLD AUTO: 328 THOUSANDS/UL (ref 149–390)
PMV BLD AUTO: 9.8 FL (ref 8.9–12.7)
RBC # BLD AUTO: 4.57 MILLION/UL (ref 3.81–5.12)
WBC # BLD AUTO: 9.87 THOUSAND/UL (ref 4.31–10.16)

## 2023-10-19 PROCEDURE — 85652 RBC SED RATE AUTOMATED: CPT

## 2023-10-19 PROCEDURE — 85025 COMPLETE CBC W/AUTO DIFF WBC: CPT

## 2023-10-19 PROCEDURE — 36415 COLL VENOUS BLD VENIPUNCTURE: CPT

## 2023-11-28 ENCOUNTER — APPOINTMENT (OUTPATIENT)
Dept: LAB | Facility: CLINIC | Age: 28
End: 2023-11-28
Payer: COMMERCIAL

## 2023-11-28 DIAGNOSIS — Z72.820 LACK OF ADEQUATE SLEEP: ICD-10-CM

## 2023-11-28 DIAGNOSIS — E66.3 EXCESS WEIGHT: ICD-10-CM

## 2023-11-28 DIAGNOSIS — R61 NIGHT SWEATS: ICD-10-CM

## 2023-11-28 DIAGNOSIS — Z73.3 PHYSICAL STRESS: ICD-10-CM

## 2023-11-28 LAB
25(OH)D3 SERPL-MCNC: 17.1 NG/ML (ref 30–100)
ALBUMIN SERPL BCP-MCNC: 4.6 G/DL (ref 3.5–5)
ALP SERPL-CCNC: 37 U/L (ref 34–104)
ALT SERPL W P-5'-P-CCNC: 19 U/L (ref 7–52)
ANION GAP SERPL CALCULATED.3IONS-SCNC: 8 MMOL/L
AST SERPL W P-5'-P-CCNC: 18 U/L (ref 13–39)
BASOPHILS # BLD AUTO: 0.04 THOUSANDS/ÂΜL (ref 0–0.1)
BASOPHILS NFR BLD AUTO: 1 % (ref 0–1)
BILIRUB SERPL-MCNC: 0.32 MG/DL (ref 0.2–1)
BUN SERPL-MCNC: 13 MG/DL (ref 5–25)
CALCIUM SERPL-MCNC: 9.8 MG/DL (ref 8.4–10.2)
CHLORIDE SERPL-SCNC: 106 MMOL/L (ref 96–108)
CHOLEST SERPL-MCNC: 209 MG/DL
CO2 SERPL-SCNC: 25 MMOL/L (ref 21–32)
CORTIS SERPL-MCNC: 9.6 UG/DL
CREAT SERPL-MCNC: 0.66 MG/DL (ref 0.6–1.3)
EOSINOPHIL # BLD AUTO: 0.17 THOUSAND/ÂΜL (ref 0–0.61)
EOSINOPHIL NFR BLD AUTO: 3 % (ref 0–6)
ERYTHROCYTE [DISTWIDTH] IN BLOOD BY AUTOMATED COUNT: 12.6 % (ref 11.6–15.1)
EST. AVERAGE GLUCOSE BLD GHB EST-MCNC: 100 MG/DL
ESTRADIOL SERPL-MCNC: 110.2 PG/ML
FERRITIN SERPL-MCNC: 41 NG/ML (ref 11–307)
FSH SERPL-ACNC: 7.3 MIU/ML
GFR SERPL CREATININE-BSD FRML MDRD: 118 ML/MIN/1.73SQ M
GLUCOSE P FAST SERPL-MCNC: 79 MG/DL (ref 65–99)
HBA1C MFR BLD: 5.1 %
HCT VFR BLD AUTO: 42.7 % (ref 34.8–46.1)
HDLC SERPL-MCNC: 54 MG/DL
HGB BLD-MCNC: 14 G/DL (ref 11.5–15.4)
IMM GRANULOCYTES # BLD AUTO: 0.02 THOUSAND/UL (ref 0–0.2)
IMM GRANULOCYTES NFR BLD AUTO: 0 % (ref 0–2)
INSULIN SERPL-ACNC: 7.08 UIU/ML (ref 1.9–23)
LDLC SERPL CALC-MCNC: 129 MG/DL (ref 0–100)
LYMPHOCYTES # BLD AUTO: 1.82 THOUSANDS/ÂΜL (ref 0.6–4.47)
LYMPHOCYTES NFR BLD AUTO: 30 % (ref 14–44)
MCH RBC QN AUTO: 30.1 PG (ref 26.8–34.3)
MCHC RBC AUTO-ENTMCNC: 32.8 G/DL (ref 31.4–37.4)
MCV RBC AUTO: 92 FL (ref 82–98)
MONOCYTES # BLD AUTO: 0.3 THOUSAND/ÂΜL (ref 0.17–1.22)
MONOCYTES NFR BLD AUTO: 5 % (ref 4–12)
NEUTROPHILS # BLD AUTO: 3.73 THOUSANDS/ÂΜL (ref 1.85–7.62)
NEUTS SEG NFR BLD AUTO: 61 % (ref 43–75)
NONHDLC SERPL-MCNC: 155 MG/DL
NRBC BLD AUTO-RTO: 0 /100 WBCS
PLATELET # BLD AUTO: 285 THOUSANDS/UL (ref 149–390)
PMV BLD AUTO: 10.2 FL (ref 8.9–12.7)
POTASSIUM SERPL-SCNC: 4 MMOL/L (ref 3.5–5.3)
PROT SERPL-MCNC: 7.3 G/DL (ref 6.4–8.4)
RBC # BLD AUTO: 4.65 MILLION/UL (ref 3.81–5.12)
SODIUM SERPL-SCNC: 139 MMOL/L (ref 135–147)
T3FREE SERPL-MCNC: 3.74 PG/ML (ref 2.5–3.9)
T4 FREE SERPL-MCNC: 0.76 NG/DL (ref 0.61–1.12)
TRIGL SERPL-MCNC: 128 MG/DL
TSH SERPL DL<=0.05 MIU/L-ACNC: 0.81 UIU/ML (ref 0.45–4.5)
VIT B12 SERPL-MCNC: 705 PG/ML (ref 180–914)
WBC # BLD AUTO: 6.08 THOUSAND/UL (ref 4.31–10.16)

## 2023-11-28 PROCEDURE — 84482 T3 REVERSE: CPT

## 2023-11-28 PROCEDURE — 82533 TOTAL CORTISOL: CPT

## 2023-11-28 PROCEDURE — 82728 ASSAY OF FERRITIN: CPT

## 2023-11-28 PROCEDURE — 36415 COLL VENOUS BLD VENIPUNCTURE: CPT

## 2023-11-28 PROCEDURE — 82306 VITAMIN D 25 HYDROXY: CPT

## 2023-11-28 PROCEDURE — 84432 ASSAY OF THYROGLOBULIN: CPT

## 2023-11-28 PROCEDURE — 84481 FREE ASSAY (FT-3): CPT

## 2023-11-28 PROCEDURE — 80061 LIPID PANEL: CPT

## 2023-11-28 PROCEDURE — 84439 ASSAY OF FREE THYROXINE: CPT

## 2023-11-28 PROCEDURE — 86800 THYROGLOBULIN ANTIBODY: CPT

## 2023-11-28 PROCEDURE — 83036 HEMOGLOBIN GLYCOSYLATED A1C: CPT

## 2023-11-28 PROCEDURE — 84443 ASSAY THYROID STIM HORMONE: CPT

## 2023-11-28 PROCEDURE — 80053 COMPREHEN METABOLIC PANEL: CPT

## 2023-11-28 PROCEDURE — 82670 ASSAY OF TOTAL ESTRADIOL: CPT

## 2023-11-28 PROCEDURE — 83001 ASSAY OF GONADOTROPIN (FSH): CPT

## 2023-11-28 PROCEDURE — 83525 ASSAY OF INSULIN: CPT

## 2023-11-28 PROCEDURE — 86376 MICROSOMAL ANTIBODY EACH: CPT

## 2023-11-28 PROCEDURE — 82607 VITAMIN B-12: CPT

## 2023-11-28 PROCEDURE — 85025 COMPLETE CBC W/AUTO DIFF WBC: CPT

## 2023-11-29 LAB
THYROGLOB AB SERPL-ACNC: <1 IU/ML (ref 0–0.9)
THYROGLOB SERPL-MCNC: 7 NG/ML (ref 1.5–38.5)
THYROPEROXIDASE AB SERPL-ACNC: 16 IU/ML (ref 0–34)

## 2023-11-30 LAB — T3REVERSE SERPL-MCNC: 16 NG/DL (ref 9.2–24.1)

## 2024-02-02 ENCOUNTER — OFFICE VISIT (OUTPATIENT)
Dept: URGENT CARE | Facility: CLINIC | Age: 29
End: 2024-02-02
Payer: COMMERCIAL

## 2024-02-02 VITALS
HEART RATE: 93 BPM | SYSTOLIC BLOOD PRESSURE: 112 MMHG | RESPIRATION RATE: 18 BRPM | TEMPERATURE: 98.7 F | DIASTOLIC BLOOD PRESSURE: 62 MMHG | OXYGEN SATURATION: 100 %

## 2024-02-02 DIAGNOSIS — J02.9 PHARYNGITIS, UNSPECIFIED ETIOLOGY: Primary | ICD-10-CM

## 2024-02-02 DIAGNOSIS — B97.89 VIRAL RESPIRATORY ILLNESS: ICD-10-CM

## 2024-02-02 DIAGNOSIS — J98.8 VIRAL RESPIRATORY ILLNESS: ICD-10-CM

## 2024-02-02 LAB
S PYO AG THROAT QL: NEGATIVE
SARS-COV-2 AG UPPER RESP QL IA: NEGATIVE
VALID CONTROL: NORMAL

## 2024-02-02 PROCEDURE — 87811 SARS-COV-2 COVID19 W/OPTIC: CPT | Performed by: PHYSICIAN ASSISTANT

## 2024-02-02 PROCEDURE — 87636 SARSCOV2 & INF A&B AMP PRB: CPT | Performed by: PHYSICIAN ASSISTANT

## 2024-02-02 PROCEDURE — 87880 STREP A ASSAY W/OPTIC: CPT | Performed by: PHYSICIAN ASSISTANT

## 2024-02-02 PROCEDURE — G0382 LEV 3 HOSP TYPE B ED VISIT: HCPCS | Performed by: PHYSICIAN ASSISTANT

## 2024-02-03 NOTE — PATIENT INSTRUCTIONS
COVID influenza testing initiated.  Results take approximately 24 to 48 hours to return.  You may access those results on your [facility]'s [email or web address] account.    Rapid strep test in office is negative.  No antibiotic indicated at this time.  Rapid COVID test is negative in the office this evening.  This test is less sensitive for detection of COVID versus PCR testing.      There are a number of viral respiratory illnesses that can present similarly.  Most are self-limiting.  Antibiotics do not help viral illnesses.       Most upper respiratory symptoms start to improve after 7-12 days but may take a few weeks to completely resolve.        As with any respiratory illness, transmission precautions  are strongly advised.  Masking.  Isolating.  Hand washing.  Frequent cleaning of common use surfaces.      Follow up with your PCP office if not improving over next 7-10 days.  If you want to be proactive, you may contact your PCP office now to schedule follow up for near future.    If you feel like your are severely worsening or have significant weakness, chest pain, shortness of breath proceed to ER for immediate further evaluation.  ---------------------------------------------------------------------------------------------------------------------------------------------------------------------------------------------------------------------------------------------------------------    Strongly encourage getting plenty of rest over the next few days.  Increase your hydration.    Vaporizer by bedside may also be helpful.        Symptom Relief Suggestions:    Options for sore throat or hoarseness:              * Warm salt water gargles every 1-2 hours while awake        * Throat lozenges, Tylenol, voice rest, warm tea with honey.    Options for sinus pressure, nasal congestion, runny nose, and / or post nasal drip:            * Clearing your sinuses in a nice steamy shower may be helpful, especially first thing after  waking.       * Nasal saline rinses every 1-2 hours while awake may also help decrease nasal congestion, drainage.       * Afrin nasal spray if significant nasal congestion at bedtime may use .  (Do not use for over 3 days however.)       * Decongestant / expectorant such as Mucinex D 12 hour 1/2 to 1 tablet as needed with full glass of fluids may help decrease pressure and drainage.    Options for ear pressure, discomfort:         * Decongestant may be helpful.       * Flonase nasal spray.       * Warm compresses against ear(s) for comfort.    Options for help with  cough:         * Warm tea with honey or a teaspoon of honey periodically throughout day and / or before bed.       * Plain Mucinex (an expectorant to help keep mucus thin so you can clear it easier) or Mucinex DM (expectorant / cough suppressant) to help decrease cough if it is bothering your sleep.        Other night time cough medication options include Delsym, Robitussin DM, NyQuil.         * Propping with an extra pillow or two may be helpful.       * Keep water by your bedside to sip on as needed.       * Cough drops.       * Vaporizer by bedside.       * Getting in steamy shower or bathroom.  Cool air may also soothe coughing spasm.      ----------------------------------------------------------------------------------------------------------------------------------------------------------------------------------------------------------------------------------------------------------------      Although bothersome, mucous is not necessarily a bad thing.  Production of mucous is the body's way of trying to capture and flush irritants from mucosal surfaces.  Yellow or green mucous does not necessarily mean you have a bacterial infection.   Mucous will become more discolored over time, especially first thing in the morning, as your body's immune system  floods the mucosal surfaces with white bloods cells to try and help fight  infection.  This white  blood cell debride can also cause mucous to be discolored.  Again, using nasal saline spray frequently may help soothe and keep mucous flowing out versus getting dried, thickened and / or stuck leading to more sinus pain and pressure.     If you have a cough, please realize that a cough is not necessarily a bad thing.  It is a part of your body's protection mechanism to help keep your airways clear.  Phlegm may be more discolored in the morning.  Please note that discolored phlegm does not necessarily mean a bacterial infection.

## 2024-02-03 NOTE — PROGRESS NOTES
[facility]    NAME: Cherelle Nugent is a 28 y.o. female  : 1995    MRN: [id number]  DATE: 2024  TIME: 7:30 PM    Assessment and Plan   Sore throat [J02.9]  1. Sore throat  POCT rapid strepA      2. Generalized body aches  Poct Covid 19 Rapid Antigen Test          Patient Instructions   There are no Patient Instructions on file for this visit.    Chief Complaint     Chief Complaint   Patient presents with    Cold Like Symptoms     Pt C/O body aches, B/L ear pressure, sinus congestion and a cough that started yesterday.     Sore Throat     Pt states son being being treated for strep.        History of Present Illness   Cherelle Nugent presents to the clinic c/o  28-year-old female comes in with sore throat body aches and pains ear pressure sinus congestion drainage and cough.    Started: Yesterday.  Associated signs and symptoms: Nasal congestion drainage sore throat cough body aches and pains fatigue.  No fever or chills.  Modifying factors: Has been drinking tea and taking ibuprofen and resting.  Known Exposures: 5-year-old son who is in  has similar symptoms that started yesterday.  Recent travel:  No.  Hx asthma:  No.  Hx pneumonia:  No.      Sore Throat   Associated symptoms include congestion, coughing, headaches and trouble swallowing. Pertinent negatives include no ear discharge, ear pain or shortness of breath.       Review of Systems   Review of Systems   Constitutional:  Positive for activity change, appetite change and fatigue. Negative for chills, diaphoresis and fever.   HENT:  Positive for congestion, postnasal drip, rhinorrhea, sinus pressure, sore throat and trouble swallowing. Negative for ear discharge, ear pain and sinus pain.    Eyes: Negative.    Respiratory:  Positive for cough. Negative for chest tightness, shortness of breath and wheezing.    Cardiovascular: Negative.    Musculoskeletal:  Positive for myalgias.   Neurological:  Positive for  headaches.   Hematological: Negative.        Current Medications     Long-Term Medications   Medication Sig Dispense Refill    cetirizine (ZyrTEC) 10 mg tablet Take 1 tablet (10 mg total) by mouth daily for 10 days 10 tablet 0    fluticasone (FLONASE) 50 mcg/act nasal spray 2 sprays into each nostril daily (Patient not taking: Reported on 6/13/2022) 16 g 0    fluticasone (FLONASE) 50 mcg/act nasal spray 1 spray into each nostril 2 (two) times a day (Patient not taking: Reported on 2/2/2024) 11.1 mL 0    loratadine (CLARITIN) 10 mg tablet Take 10 mg by mouth if needed (Patient not taking: Reported on 2/2/2024)      pseudoephedrine (SUDAFED) 120 MG 12 hr tablet Take 1 tablet (120 mg total) by mouth every 12 (twelve) hours for 10 days 20 tablet 0       Current Allergies     Allergies as of 02/02/2024 - Reviewed 02/02/2024   Allergen Reaction Noted    Other Allergic Rhinitis 10/23/2017          The following portions of the patient's history were reviewed and updated as appropriate: allergies, current medications, past family history, past medical history, past social history, past surgical history and problem list.  Past Medical History:   Diagnosis Date    Exercises two times per week     to 3x/week    Irritable bowel syndrome     Lipoma     Seasonal allergies      Past Surgical History:   Procedure Laterality Date    NO PAST SURGERIES      AK EXC B9 LESION MRGN XCP SK TG T/A/L 0.5 CM/< Right 6/16/2022    Procedure: CHEST WALL EXCISION;  Surgeon: Linda Muñiz MD;  Location: [facility];  Service: General     Family History   Problem Relation Age of Onset    Aneurysm Father         cardiac    Heart disease Father         problem    Leukemia Brother         chronic lymphoid    Aneurysm Paternal Grandfather         cardiac     Throat cancer Family     Alcohol abuse Neg Hx     Substance Abuse Neg Hx     Mental illness Neg Hx        Objective   /62 (BP Location: Right arm, Patient Position: Sitting, Cuff Size:  Standard)   Pulse 93   Temp 98.7 °F (37.1 °C) (Tympanic)   Resp 18   LMP 01/17/2024   SpO2 100%   Patient's last menstrual period was 01/17/2024.       Physical Exam     Physical Exam  Vitals and nursing note reviewed.   Constitutional:       General: She is not in acute distress.     Appearance: She is well-developed. She is ill-appearing. She is not toxic-appearing or diaphoretic.      Comments: Appears mildly ill but in no acute distress.  No trismus or conversational dyspnea.   HENT:      Head: Normocephalic and atraumatic.      Right Ear: Tympanic membrane, ear canal and external ear normal.      Left Ear: Tympanic membrane, ear canal and external ear normal.      Nose: Congestion and rhinorrhea present.      Mouth/Throat:      Mouth: Mucous membranes are moist.      Pharynx: Posterior oropharyngeal erythema present. No oropharyngeal exudate.      Comments: Cobblestoning posterior pharynx with patchy redness  Eyes:      General:         Right eye: No discharge.         Left eye: No discharge.      Conjunctiva/sclera: Conjunctivae normal.      Pupils: Pupils are equal, round, and reactive to light.   Cardiovascular:      Rate and Rhythm: Normal rate and regular rhythm.      Heart sounds: Normal heart sounds. No murmur heard.     No friction rub. No gallop.   Pulmonary:      Effort: Pulmonary effort is normal. No respiratory distress.      Breath sounds: Normal breath sounds. No stridor. No wheezing, rhonchi or rales.   Musculoskeletal:      Cervical back: Normal range of motion and neck supple. No rigidity or tenderness.   Lymphadenopathy:      Cervical: No cervical adenopathy.   Skin:     General: Skin is warm and dry.      Coloration: Skin is not jaundiced or pale.      Findings: No rash.   Neurological:      Mental Status: She is alert and oriented to person, place, and time.   Psychiatric:         Mood and Affect: Mood normal.         Behavior: Behavior normal.              or shortness of breath.       Review of Systems   Review of Systems   Constitutional:  Positive for activity change, appetite change and fatigue. Negative for chills, diaphoresis and fever.   HENT:  Positive for congestion, postnasal drip, rhinorrhea, sinus pressure, sore throat and trouble swallowing. Negative for ear discharge, ear pain and sinus pain.    Eyes: Negative.    Respiratory:  Positive for cough. Negative for chest tightness, shortness of breath and wheezing.    Cardiovascular: Negative.    Musculoskeletal:  Positive for myalgias.   Neurological:  Positive for headaches.   Hematological: Negative.        Current Medications     Long-Term Medications   Medication Sig Dispense Refill    cetirizine (ZyrTEC) 10 mg tablet Take 1 tablet (10 mg total) by mouth daily for 10 days 10 tablet 0    fluticasone (FLONASE) 50 mcg/act nasal spray 2 sprays into each nostril daily (Patient not taking: Reported on 6/13/2022) 16 g 0    fluticasone (FLONASE) 50 mcg/act nasal spray 1 spray into each nostril 2 (two) times a day (Patient not taking: Reported on 2/2/2024) 11.1 mL 0    loratadine (CLARITIN) 10 mg tablet Take 10 mg by mouth if needed (Patient not taking: Reported on 2/2/2024)      pseudoephedrine (SUDAFED) 120 MG 12 hr tablet Take 1 tablet (120 mg total) by mouth every 12 (twelve) hours for 10 days 20 tablet 0       Current Allergies     Allergies as of 02/02/2024 - Reviewed 02/02/2024   Allergen Reaction Noted    Other Allergic Rhinitis 10/23/2017          The following portions of the patient's history were reviewed and updated as appropriate: allergies, current medications, past family history, past medical history, past social history, past surgical history and problem list.  Past Medical History:   Diagnosis Date    Exercises two times per week     to 3x/week    Irritable bowel syndrome     Lipoma     Seasonal allergies      Past Surgical History:   Procedure Laterality Date    NO PAST SURGERIES      CA EXC  B9 LESION MRGN XCP SK TG T/A/L 0.5 CM/< Right 6/16/2022    Procedure: CHEST WALL EXCISION;  Surgeon: Linda Muñiz MD;  Location: [facility];  Service: General     Family History   Problem Relation Age of Onset    Aneurysm Father         cardiac    Heart disease Father         problem    Leukemia Brother         chronic lymphoid    Aneurysm Paternal Grandfather         cardiac     Throat cancer Family     Alcohol abuse Neg Hx     Substance Abuse Neg Hx     Mental illness Neg Hx        Objective   /62 (BP Location: Right arm, Patient Position: Sitting, Cuff Size: Standard)   Pulse 93   Temp 98.7 °F (37.1 °C) (Tympanic)   Resp 18   LMP 01/17/2024   SpO2 100%   Patient's last menstrual period was 01/17/2024.       Physical Exam     Physical Exam  Vitals and nursing note reviewed.   Constitutional:       General: She is not in acute distress.     Appearance: She is well-developed. She is ill-appearing. She is not toxic-appearing or diaphoretic.      Comments: Appears mildly ill but in no acute distress.  No trismus or conversational dyspnea.   HENT:      Head: Normocephalic and atraumatic.      Right Ear: Tympanic membrane, ear canal and external ear normal.      Left Ear: Tympanic membrane, ear canal and external ear normal.      Nose: Congestion and rhinorrhea present.      Mouth/Throat:      Mouth: Mucous membranes are moist.      Pharynx: Posterior oropharyngeal erythema present. No oropharyngeal exudate.      Comments: Cobblestoning posterior pharynx with patchy redness  Eyes:      General:         Right eye: No discharge.         Left eye: No discharge.      Conjunctiva/sclera: Conjunctivae normal.      Pupils: Pupils are equal, round, and reactive to light.   Cardiovascular:      Rate and Rhythm: Normal rate and regular rhythm.      Heart sounds: Normal heart sounds. No murmur heard.     No friction rub. No gallop.   Pulmonary:      Effort: Pulmonary effort is normal. No respiratory distress.       Breath sounds: Normal breath sounds. No stridor. No wheezing, rhonchi or rales.   Musculoskeletal:      Cervical back: Normal range of motion and neck supple. No rigidity or tenderness.   Lymphadenopathy:      Cervical: No cervical adenopathy.   Skin:     General: Skin is warm and dry.      Coloration: Skin is not jaundiced or pale.      Findings: No rash.   Neurological:      Mental Status: She is alert and oriented to person, place, and time.   Psychiatric:         Mood and Affect: Mood normal.         Behavior: Behavior normal.

## 2024-02-06 ENCOUNTER — TELEPHONE (OUTPATIENT)
Dept: URGENT CARE | Facility: CLINIC | Age: 29
End: 2024-02-06

## 2024-02-27 ENCOUNTER — HOSPITAL ENCOUNTER (OUTPATIENT)
Dept: NON INVASIVE DIAGNOSTICS | Facility: HOSPITAL | Age: 29
Discharge: HOME | End: 2024-02-27

## 2024-02-27 DIAGNOSIS — I82.4Z3 DVT, LOWER EXTREMITY, DISTAL, ACUTE, BILATERAL (HCC): ICD-10-CM

## 2024-02-28 ENCOUNTER — HOSPITAL ENCOUNTER (OUTPATIENT)
Dept: NON INVASIVE DIAGNOSTICS | Facility: HOSPITAL | Age: 29
Discharge: HOME | End: 2024-02-28
Payer: COMMERCIAL

## 2024-02-28 PROCEDURE — 93970 EXTREMITY STUDY: CPT

## 2024-02-28 PROCEDURE — 93970 EXTREMITY STUDY: CPT | Performed by: SURGERY

## 2024-11-10 ENCOUNTER — OFFICE VISIT (OUTPATIENT)
Dept: URGENT CARE | Facility: MEDICAL CENTER | Age: 29
End: 2024-11-10
Payer: COMMERCIAL

## 2024-11-10 VITALS
HEART RATE: 75 BPM | HEIGHT: 61 IN | WEIGHT: 130.6 LBS | DIASTOLIC BLOOD PRESSURE: 62 MMHG | RESPIRATION RATE: 16 BRPM | OXYGEN SATURATION: 100 % | BODY MASS INDEX: 24.66 KG/M2 | TEMPERATURE: 98.5 F | SYSTOLIC BLOOD PRESSURE: 106 MMHG

## 2024-11-10 DIAGNOSIS — J02.9 SORE THROAT: Primary | ICD-10-CM

## 2024-11-10 DIAGNOSIS — H65.02 NON-RECURRENT ACUTE SEROUS OTITIS MEDIA OF LEFT EAR: ICD-10-CM

## 2024-11-10 LAB — S PYO AG THROAT QL: NEGATIVE

## 2024-11-10 PROCEDURE — 87070 CULTURE OTHR SPECIMN AEROBIC: CPT

## 2024-11-10 PROCEDURE — S9083 URGENT CARE CENTER GLOBAL: HCPCS

## 2024-11-10 PROCEDURE — G0382 LEV 3 HOSP TYPE B ED VISIT: HCPCS

## 2024-11-10 PROCEDURE — 87880 STREP A ASSAY W/OPTIC: CPT

## 2024-11-10 NOTE — PROGRESS NOTES
[facility]        NAME: Cherelle Nugent is a 29 y.o. female  : 1995    MRN: [id number]  DATE: November 10, 2024  TIME: 12:09 PM    Assessment and Plan   Sore throat [J02.9]  1. Sore throat  POCT rapid ANTIGEN strepA    Throat culture    Throat culture      2. Non-recurrent acute serous otitis media of left ear          Started Friday with sore throat, PND- has allergies- not taking meds.Sinus pressure. Rapid strep done-nega will send culture    Patient Instructions       Follow up with PCP in 3-5 days.  Proceed to  ER if symptoms worsen.    If tests have been performed at [facility], our office will contact you with results if changes need to be made to the care plan discussed with you at the visit.  [facility]    Chief Complaint     Chief Complaint   Patient presents with    Sore Throat     Cold-like symptoms starting about 2 days ago. Pt took Tylenol and Dayquil but it did not help very much.          History of Present Illness       Started Friday with sore throat, PND- has allergies- not taking meds.Sinus pressure. Rapid strep done-nega will send culture    Sore Throat   Associated symptoms include ear pain. Pertinent negatives include no coughing or shortness of breath.       Review of Systems   Review of Systems   Constitutional:  Negative for activity change and fever.   HENT:  Positive for ear pain, postnasal drip and sore throat.    Respiratory:  Negative for cough, shortness of breath and wheezing.    Allergic/Immunologic: Positive for environmental allergies.   All other systems reviewed and are negative.        Current Medications       Current Outpatient Medications:     Balcoltra 0.1-20 MG-MCG(21) TABS, in the morning, Disp: , Rfl:     benzonatate (TESSALON PERLES) 100 mg capsule, Take 1 capsule (100 mg total) by mouth 3 (three) times a day as needed for cough (Patient not taking: Reported on 2024), Disp: 20 capsule, Rfl: 0    Black  Pepper-Turmeric (TURMERIC COMPLEX/BLACK PEPPER PO), Take by mouth With cayenne (Patient not taking: Reported on 2/2/2024), Disp: , Rfl:     cetirizine (ZyrTEC) 10 mg tablet, Take 1 tablet (10 mg total) by mouth daily for 10 days, Disp: 10 tablet, Rfl: 0    fluticasone (FLONASE) 50 mcg/act nasal spray, 2 sprays into each nostril daily (Patient not taking: Reported on 6/13/2022), Disp: 16 g, Rfl: 0    fluticasone (FLONASE) 50 mcg/act nasal spray, 1 spray into each nostril 2 (two) times a day (Patient not taking: Reported on 2/2/2024), Disp: 11.1 mL, Rfl: 0    guaiFENesin (ROBITUSSIN) 100 MG/5ML oral liquid, Take 10 mL (200 mg total) by mouth 3 (three) times a day as needed for cough (Patient not taking: Reported on 2/2/2024), Disp: 120 mL, Rfl: 0    loratadine (CLARITIN) 10 mg tablet, Take 10 mg by mouth if needed (Patient not taking: Reported on 2/2/2024), Disp: , Rfl:     methylPREDNISolone 4 MG tablet therapy pack, TAKE (ORAL) 1 TIME PER DAY FOR 6 DAYS 6,5,4,3,2,1 (Patient not taking: Reported on 2/2/2024), Disp: , Rfl:     Multiple Vitamin (multivitamin) tablet, Take 1 tablet by mouth daily (Patient not taking: Reported on 2/2/2024), Disp: , Rfl:     Probiotic Product (PROBIOTIC DAILY PO), Take by mouth (Patient not taking: Reported on 2/2/2024), Disp: , Rfl:     promethazine-dextromethorphan (PHENERGAN-DM) 6.25-15 mg/5 mL oral syrup, TAKE 5 ML (ORAL) 4 TIMES PER DAY AS NEEDED FOR COUGH FOR 7 DAYS (Patient not taking: Reported on 2/2/2024), Disp: , Rfl:     pseudoephedrine (SUDAFED) 120 MG 12 hr tablet, Take 1 tablet (120 mg total) by mouth every 12 (twelve) hours for 10 days, Disp: 20 tablet, Rfl: 0    valACYclovir (VALTREX) 500 mg tablet, if needed Usually yearly (Patient not taking: Reported on 2/2/2024), Disp: , Rfl:     Current Allergies     Allergies as of 11/10/2024 - Reviewed 02/02/2024   Allergen Reaction Noted    Other Allergic Rhinitis 10/23/2017            The following portions of the patient's  "history were reviewed and updated as appropriate: allergies, current medications, past family history, past medical history, past social history, past surgical history and problem list.     Past Medical History:   Diagnosis Date    Exercises two times per week     to 3x/week    Irritable bowel syndrome     Lipoma     Seasonal allergies        Past Surgical History:   Procedure Laterality Date    NO PAST SURGERIES      MO EXC B9 LESION MRGN XCP SK TG T/A/L 0.5 CM/< Right 6/16/2022    Procedure: CHEST WALL EXCISION;  Surgeon: Linda Muñiz MD;  Location: [facility] OR;  Service: General       Family History   Problem Relation Age of Onset    Aneurysm Father         cardiac    Heart disease Father         problem    Leukemia Brother         chronic lymphoid    Aneurysm Paternal Grandfather         cardiac     Throat cancer Family     Alcohol abuse Neg Hx     Substance Abuse Neg Hx     Mental illness Neg Hx          Medications have been verified.        Objective   /62   Pulse 75   Temp 98.5 °F (36.9 °C) (Tympanic)   Resp 16   Ht 5' 1\" (1.549 m)   Wt 59.2 kg (130 lb 9.6 oz)   SpO2 100%   BMI 24.68 kg/m²   No LMP recorded. (Menstrual status: Birth Control).       Physical Exam     Physical Exam  Vitals reviewed.   Constitutional:       Appearance: She is well-developed.   HENT:      Right Ear: Tympanic membrane normal.      Left Ear: A middle ear effusion is present.      Mouth/Throat:      Pharynx: Posterior oropharyngeal erythema present. No oropharyngeal exudate.      Tonsils: No tonsillar exudate. 0 on the right. 0 on the left.   Cardiovascular:      Rate and Rhythm: Normal rate and regular rhythm.      Heart sounds: Normal heart sounds.   Pulmonary:      Effort: Pulmonary effort is normal.      Breath sounds: Normal breath sounds.   Lymphadenopathy:      Cervical: No cervical adenopathy.   Neurological:      Mental Status: She is alert.                   "

## 2024-11-12 LAB — BACTERIA THROAT CULT: NORMAL

## 2024-11-16 ENCOUNTER — OFFICE VISIT (OUTPATIENT)
Dept: URGENT CARE | Facility: MEDICAL CENTER | Age: 29
End: 2024-11-16
Payer: COMMERCIAL

## 2024-11-16 VITALS
BODY MASS INDEX: 24.64 KG/M2 | TEMPERATURE: 98.2 F | OXYGEN SATURATION: 98 % | WEIGHT: 130.4 LBS | RESPIRATION RATE: 16 BRPM | SYSTOLIC BLOOD PRESSURE: 110 MMHG | DIASTOLIC BLOOD PRESSURE: 69 MMHG | HEART RATE: 79 BPM

## 2024-11-16 DIAGNOSIS — J01.90 ACUTE NON-RECURRENT SINUSITIS, UNSPECIFIED LOCATION: Primary | ICD-10-CM

## 2024-11-16 PROCEDURE — G0381 LEV 2 HOSP TYPE B ED VISIT: HCPCS | Performed by: NURSE PRACTITIONER

## 2024-11-16 PROCEDURE — S9083 URGENT CARE CENTER GLOBAL: HCPCS | Performed by: NURSE PRACTITIONER

## 2024-11-16 NOTE — PATIENT INSTRUCTIONS
Ibuprofen 400mg every 6-8 hours as needed for pain and/or fever  Mucinex-DM for congestion and cough  Saline nasal spray 2-3 times a day  Increase hydration  Viral illness is typically 7-10 days, days 3-5 being the worst  Rest as needed  Hope you're feeling better soon!  If your symptoms are worsening and continued fevers, [facility] or your PCP     If your symptoms are not improving in 3-5 days, you can start taking the antibiotics prescribed at this visit  If your symptoms continue to improve, even if slowly, please continue supportive care

## 2024-11-16 NOTE — PROGRESS NOTES
"  [facility]        NAME: Cherelle Nugent is a 29 y.o. female  : 1995    MRN: [id number]  DATE: 2024  TIME: 4:02 PM    Assessment and Plan   Acute non-recurrent sinusitis, unspecified location [J01.90]  1. Acute non-recurrent sinusitis, unspecified location  amoxicillin-clavulanate (AUGMENTIN) 875-125 mg per tablet        Patient in NAD and VSS upon exam. Discussed with patient viral vs bacterial illness, will give patient antibiotics at this time with watch and wait instructions. Discussed with patient if symptoms are not improving or have worsened in 3-5 days, start antibiotics at this time. If symptoms have improved with supportive care in that time, antibiotics will not be started. Discussed supportive care and return precautions.      Patient Instructions       Follow up with PCP in 3-5 days.  Proceed to  ER if symptoms worsen.    If tests have been performed at [facility], our office will contact you with results if changes need to be made to the care plan discussed with you at the visit.  [facility]    Chief Complaint     Chief Complaint   Patient presents with    Cold Like Symptoms     Patient relates she was seen here x1 week ago for viral illness. Here today because she is still having sinus pain, headaches, bilateral ear pain, and everything is still \"lingering\" Denies fever.         History of Present Illness       Started: 8 days ago  Was seen 6 days ago, negative for strep  Positive: fatigue, throat dryness, ears feel clogged, MACDONALD  Overall is improving  Negative: fevers  Denies CP, SOB, trouble breathing  Treatment: advil cold and sinus, flonase        Review of Systems   Review of Systems   Constitutional:  Positive for fatigue.   HENT:  Positive for ear pain, sinus pressure and sore throat.    Neurological:  Positive for headaches.   All other systems reviewed and are negative.        Current Medications       Current " Outpatient Medications:     amoxicillin-clavulanate (AUGMENTIN) 875-125 mg per tablet, Take 1 tablet by mouth every 12 (twelve) hours for 7 days, Disp: 14 tablet, Rfl: 0    Balcoltra 0.1-20 MG-MCG(21) TABS, in the morning, Disp: , Rfl:     Multiple Vitamin (multivitamin) tablet, Take 1 tablet by mouth daily, Disp: , Rfl:     benzonatate (TESSALON PERLES) 100 mg capsule, Take 1 capsule (100 mg total) by mouth 3 (three) times a day as needed for cough (Patient not taking: Reported on 11/16/2024), Disp: 20 capsule, Rfl: 0    Black Pepper-Turmeric (TURMERIC COMPLEX/BLACK PEPPER PO), Take by mouth With cayenne (Patient not taking: Reported on 11/16/2024), Disp: , Rfl:     cetirizine (ZyrTEC) 10 mg tablet, Take 1 tablet (10 mg total) by mouth daily for 10 days, Disp: 10 tablet, Rfl: 0    fluticasone (FLONASE) 50 mcg/act nasal spray, 2 sprays into each nostril daily (Patient not taking: Reported on 11/16/2024), Disp: 16 g, Rfl: 0    fluticasone (FLONASE) 50 mcg/act nasal spray, 1 spray into each nostril 2 (two) times a day (Patient not taking: Reported on 11/16/2024), Disp: 11.1 mL, Rfl: 0    guaiFENesin (ROBITUSSIN) 100 MG/5ML oral liquid, Take 10 mL (200 mg total) by mouth 3 (three) times a day as needed for cough (Patient not taking: Reported on 11/16/2024), Disp: 120 mL, Rfl: 0    loratadine (CLARITIN) 10 mg tablet, Take 10 mg by mouth if needed (Patient not taking: Reported on 11/16/2024), Disp: , Rfl:     methylPREDNISolone 4 MG tablet therapy pack, TAKE (ORAL) 1 TIME PER DAY FOR 6 DAYS 6,5,4,3,2,1 (Patient not taking: Reported on 11/16/2024), Disp: , Rfl:     Probiotic Product (PROBIOTIC DAILY PO), Take by mouth (Patient not taking: Reported on 11/16/2024), Disp: , Rfl:     promethazine-dextromethorphan (PHENERGAN-DM) 6.25-15 mg/5 mL oral syrup, TAKE 5 ML (ORAL) 4 TIMES PER DAY AS NEEDED FOR COUGH FOR 7 DAYS (Patient not taking: Reported on 11/16/2024), Disp: , Rfl:     pseudoephedrine (SUDAFED) 120 MG 12 hr tablet,  Take 1 tablet (120 mg total) by mouth every 12 (twelve) hours for 10 days, Disp: 20 tablet, Rfl: 0    valACYclovir (VALTREX) 500 mg tablet, if needed Usually yearly (Patient not taking: Reported on 2/2/2024), Disp: , Rfl:     Current Allergies     Allergies as of 11/16/2024 - Reviewed 11/16/2024   Allergen Reaction Noted    Other Allergic Rhinitis 10/23/2017            The following portions of the patient's history were reviewed and updated as appropriate: allergies, current medications, past family history, past medical history, past social history, past surgical history and problem list.     Past Medical History:   Diagnosis Date    Exercises two times per week     to 3x/week    Irritable bowel syndrome     Lipoma     Seasonal allergies        Past Surgical History:   Procedure Laterality Date    NO PAST SURGERIES      ME EXC B9 LESION MRGN XCP SK TG T/A/L 0.5 CM/< Right 6/16/2022    Procedure: CHEST WALL EXCISION;  Surgeon: Linda Muñiz MD;  Location: [facility];  Service: General       Family History   Problem Relation Age of Onset    Aneurysm Father         cardiac    Heart disease Father         problem    Leukemia Brother         chronic lymphoid    Aneurysm Paternal Grandfather         cardiac     Throat cancer Family     Alcohol abuse Neg Hx     Substance Abuse Neg Hx     Mental illness Neg Hx          Medications have been verified.        Objective   /69 (Patient Position: Sitting)   Pulse 79   Temp 98.2 °F (36.8 °C) (Tympanic)   Resp 16   Wt 59.1 kg (130 lb 6.4 oz)   SpO2 98%   BMI 24.64 kg/m²   No LMP recorded. (Menstrual status: Birth Control).       Physical Exam     Physical Exam  Constitutional:       General: She is not in acute distress.     Appearance: Normal appearance. She is not ill-appearing.   HENT:      Head: Normocephalic and atraumatic.      Right Ear: Hearing, tympanic membrane, ear canal and external ear normal.      Left Ear: Hearing, tympanic membrane, ear canal and  external ear normal.      Nose: Rhinorrhea present. No congestion.      Right Sinus: Maxillary sinus tenderness and frontal sinus tenderness present.      Left Sinus: Maxillary sinus tenderness and frontal sinus tenderness present.      Mouth/Throat:      Lips: Pink.      Mouth: Mucous membranes are moist.      Pharynx: Oropharynx is clear.   Cardiovascular:      Rate and Rhythm: Normal rate and regular rhythm.   Pulmonary:      Effort: Pulmonary effort is normal.      Breath sounds: Normal breath sounds.      Comments: No cough on exam  Musculoskeletal:         General: Normal range of motion.   Lymphadenopathy:      Cervical: No cervical adenopathy.   Skin:     General: Skin is warm and dry.   Neurological:      Mental Status: She is alert and oriented to person, place, and time.

## 2025-03-17 ENCOUNTER — HOSPITAL ENCOUNTER (OUTPATIENT)
Dept: ULTRASOUND IMAGING | Facility: HOSPITAL | Age: 30
Discharge: HOME | End: 2025-03-17
Payer: COMMERCIAL

## 2025-03-17 DIAGNOSIS — R10.32 LEFT LOWER QUADRANT PAIN: ICD-10-CM

## 2025-03-17 DIAGNOSIS — R10.12 LEFT UPPER QUADRANT PAIN: ICD-10-CM

## 2025-03-17 PROCEDURE — 76700 US EXAM ABDOM COMPLETE: CPT

## 2025-05-03 ENCOUNTER — OFFICE VISIT (OUTPATIENT)
Dept: URGENT CARE | Facility: MEDICAL CENTER | Age: 30
End: 2025-05-03
Payer: COMMERCIAL

## 2025-05-03 VITALS
TEMPERATURE: 98 F | RESPIRATION RATE: 18 BRPM | SYSTOLIC BLOOD PRESSURE: 113 MMHG | HEART RATE: 81 BPM | DIASTOLIC BLOOD PRESSURE: 63 MMHG | OXYGEN SATURATION: 98 %

## 2025-05-03 DIAGNOSIS — J02.9 ACUTE PHARYNGITIS, UNSPECIFIED ETIOLOGY: Primary | ICD-10-CM

## 2025-05-03 LAB — S PYO AG THROAT QL: NEGATIVE

## 2025-05-03 PROCEDURE — G0382 LEV 3 HOSP TYPE B ED VISIT: HCPCS | Performed by: NURSE PRACTITIONER

## 2025-05-03 PROCEDURE — S9083 URGENT CARE CENTER GLOBAL: HCPCS | Performed by: NURSE PRACTITIONER

## 2025-05-03 PROCEDURE — 87070 CULTURE OTHR SPECIMN AEROBIC: CPT | Performed by: NURSE PRACTITIONER

## 2025-05-03 PROCEDURE — 87880 STREP A ASSAY W/OPTIC: CPT | Performed by: NURSE PRACTITIONER

## 2025-05-03 NOTE — PROGRESS NOTES
[facility]        NAME: Cherelle Nugent is a 29 y.o. female  : 1995    MRN: [id number]  DATE: May 3, 2025  TIME: 6:12 PM    Assessment and Plan   Acute pharyngitis, unspecified etiology [J02.9]  1. Acute pharyngitis, unspecified etiology  POCT rapid ANTIGEN strepA    Throat culture    Throat culture        Patient in NAD and VSS upon exam. Discussed with patient negative results of strep in office, will send for culture. Discussed supportive care and return precautions. Note for school/work given as needed.      Patient Instructions       Follow up with PCP in 3-5 days.  Proceed to  ER if symptoms worsen.    If tests have been performed at [facility], our office will contact you with results if changes need to be made to the care plan discussed with you at the visit.  [facility]    Chief Complaint     Chief Complaint   Patient presents with   • Sore Throat     Patient c/o throat pain with ear pain and sneezing x 1 day          History of Present Illness       Started: last night  Positive: HA, congestion, ST, sneezing, ear pain left, clogged ears  Negative: fever  Denies CP, SOB, trouble breathing  Treatment: dayquil  Reports hx seasonal allergies        Review of Systems   Review of Systems   HENT:  Positive for congestion, ear pain, hearing loss, sneezing and sore throat.    Neurological:  Positive for headaches.         Current Medications       Current Outpatient Medications:   •  Balcoltra 0.1-20 MG-MCG(21) TABS, in the morning, Disp: , Rfl:   •  benzonatate (TESSALON PERLES) 100 mg capsule, Take 1 capsule (100 mg total) by mouth 3 (three) times a day as needed for cough (Patient not taking: Reported on 2024), Disp: 20 capsule, Rfl: 0  •  Black Pepper-Turmeric (TURMERIC COMPLEX/BLACK PEPPER PO), Take by mouth With cayenne (Patient not taking: Reported on 2024), Disp: , Rfl:   •  cetirizine (ZyrTEC) 10 mg tablet, Take 1 tablet (10 mg  total) by mouth daily for 10 days, Disp: 10 tablet, Rfl: 0  •  fluticasone (FLONASE) 50 mcg/act nasal spray, 2 sprays into each nostril daily (Patient not taking: Reported on 11/16/2024), Disp: 16 g, Rfl: 0  •  fluticasone (FLONASE) 50 mcg/act nasal spray, 1 spray into each nostril 2 (two) times a day (Patient not taking: Reported on 11/16/2024), Disp: 11.1 mL, Rfl: 0  •  guaiFENesin (ROBITUSSIN) 100 MG/5ML oral liquid, Take 10 mL (200 mg total) by mouth 3 (three) times a day as needed for cough (Patient not taking: Reported on 11/16/2024), Disp: 120 mL, Rfl: 0  •  loratadine (CLARITIN) 10 mg tablet, Take 10 mg by mouth if needed (Patient not taking: Reported on 11/16/2024), Disp: , Rfl:   •  methylPREDNISolone 4 MG tablet therapy pack, TAKE (ORAL) 1 TIME PER DAY FOR 6 DAYS 6,5,4,3,2,1 (Patient not taking: Reported on 11/16/2024), Disp: , Rfl:   •  Multiple Vitamin (multivitamin) tablet, Take 1 tablet by mouth daily, Disp: , Rfl:   •  Probiotic Product (PROBIOTIC DAILY PO), Take by mouth (Patient not taking: Reported on 11/16/2024), Disp: , Rfl:   •  promethazine-dextromethorphan (PHENERGAN-DM) 6.25-15 mg/5 mL oral syrup, TAKE 5 ML (ORAL) 4 TIMES PER DAY AS NEEDED FOR COUGH FOR 7 DAYS (Patient not taking: Reported on 11/16/2024), Disp: , Rfl:   •  pseudoephedrine (SUDAFED) 120 MG 12 hr tablet, Take 1 tablet (120 mg total) by mouth every 12 (twelve) hours for 10 days, Disp: 20 tablet, Rfl: 0  •  valACYclovir (VALTREX) 500 mg tablet, if needed Usually yearly (Patient not taking: Reported on 2/2/2024), Disp: , Rfl:     Current Allergies     Allergies as of 05/03/2025 - Reviewed 05/03/2025   Allergen Reaction Noted   • Other Allergic Rhinitis 10/23/2017            The following portions of the patient's history were reviewed and updated as appropriate: allergies, current medications, past family history, past medical history, past social history, past surgical history and problem list.     Past Medical History:    Diagnosis Date   • Exercises two times per week     to 3x/week   • Irritable bowel syndrome    • Lipoma    • Seasonal allergies        Past Surgical History:   Procedure Laterality Date   • NO PAST SURGERIES     • ME EXC B9 LESION MRGN XCP SK TG T/A/L 0.5 CM/< Right 6/16/2022    Procedure: CHEST WALL EXCISION;  Surgeon: Linda Muñiz MD;  Location: [facility] OR;  Service: General       Family History   Problem Relation Age of Onset   • Aneurysm Father         cardiac   • Heart disease Father         problem   • Leukemia Brother         chronic lymphoid   • Aneurysm Paternal Grandfather         cardiac    • Throat cancer Family    • Alcohol abuse Neg Hx    • Substance Abuse Neg Hx    • Mental illness Neg Hx          Medications have been verified.        Objective   /63   Pulse 81   Temp 98 °F (36.7 °C)   Resp 18   SpO2 98%   No LMP recorded.       Physical Exam     Physical Exam  Constitutional:       General: She is not in acute distress.     Appearance: Normal appearance. She is not ill-appearing.   HENT:      Head: Normocephalic and atraumatic.      Right Ear: Hearing, tympanic membrane, ear canal and external ear normal.      Left Ear: Hearing, tympanic membrane, ear canal and external ear normal.      Nose: No congestion.      Right Sinus: No maxillary sinus tenderness or frontal sinus tenderness.      Left Sinus: No maxillary sinus tenderness or frontal sinus tenderness.      Mouth/Throat:      Lips: Pink.      Mouth: Mucous membranes are moist.      Pharynx: Oropharynx is clear. Posterior oropharyngeal erythema present.   Cardiovascular:      Rate and Rhythm: Normal rate and regular rhythm.   Pulmonary:      Effort: Pulmonary effort is normal.      Breath sounds: Normal breath sounds.      Comments: No cough on exam  Musculoskeletal:         General: Normal range of motion.   Lymphadenopathy:      Cervical: No cervical adenopathy.   Skin:     General: Skin is warm and dry.   Neurological:       Mental Status: She is alert and oriented to person, place, and time.

## 2025-05-03 NOTE — PATIENT INSTRUCTIONS
Ibuprofen 400mg every 6-8 hours as needed for pain  OTC ThroatCalm to help decrease severity and duration of symptoms, please note this will not treat strep but will help with viral sore throat symptoms  Hydrate and rest as needed  Follow up with PCP if not improving  If worsening symptoms, difficulty breathing or swallowing, please go to the Emergency Room  We will send your throat swab out for a culture, we will notify you only if it is positive for bacterial growth and requires further treatment

## 2025-05-06 ENCOUNTER — OFFICE VISIT (OUTPATIENT)
Dept: URGENT CARE | Facility: MEDICAL CENTER | Age: 30
End: 2025-05-06
Payer: COMMERCIAL

## 2025-05-06 VITALS
DIASTOLIC BLOOD PRESSURE: 66 MMHG | OXYGEN SATURATION: 100 % | WEIGHT: 134 LBS | RESPIRATION RATE: 18 BRPM | HEART RATE: 70 BPM | TEMPERATURE: 97.9 F | HEIGHT: 61 IN | BODY MASS INDEX: 25.3 KG/M2 | SYSTOLIC BLOOD PRESSURE: 109 MMHG

## 2025-05-06 DIAGNOSIS — J06.9 UPPER RESPIRATORY TRACT INFECTION, UNSPECIFIED TYPE: Primary | ICD-10-CM

## 2025-05-06 LAB — BACTERIA THROAT CULT: NORMAL

## 2025-05-06 PROCEDURE — 99213 OFFICE O/P EST LOW 20 MIN: CPT | Performed by: PHYSICIAN ASSISTANT

## 2025-05-06 RX ORDER — METHYLPREDNISOLONE 4 MG/1
TABLET ORAL
Qty: 21 EACH | Refills: 0 | Status: SHIPPED | OUTPATIENT
Start: 2025-05-06

## 2025-05-06 RX ORDER — BENZONATATE 200 MG/1
200 CAPSULE ORAL 3 TIMES DAILY PRN
Qty: 20 CAPSULE | Refills: 0 | Status: SHIPPED | OUTPATIENT
Start: 2025-05-06

## 2025-05-06 RX ORDER — AZELASTINE 1 MG/ML
1 SPRAY, METERED NASAL 2 TIMES DAILY
Qty: 1 ML | Refills: 0 | Status: SHIPPED | OUTPATIENT
Start: 2025-05-06

## 2025-05-06 NOTE — PATIENT INSTRUCTIONS
"      Patient Instructions    If symptoms have not started to improve by Saturday fill prescription for Augmentin and start taking as directed  Take Medrol as directed until completed  Use Astelin as directed  Tessalon as needed for cough relief  Continue with Claritin-D  Continue with home medications as prescribed  Motrin and/or Tylenol as needed for any fever or pain  Follow up with PCP in 3-5 days.  Proceed to  ER if symptoms worsen.    If tests are performed, our office will contact you with results only if changes need to made to the care plan discussed with you at the visit. You can review your full results on [facility].    Patient Education     Upper respiratory infection in adults - Discharge instructions   The Basics   Written by the doctors and editors at [facility]   What are discharge instructions? -- Discharge instructions are information about how to take care of yourself after getting medical care for a health problem.  What is an upper respiratory infection? -- An upper respiratory infection (\"URI\") is an illness that can affect your nose, throat, ears, and sinuses. Almost all URIs are caused by a virus. The common cold is an example of a viral URI. Some URIs are caused by bacteria, but this is much less common.  URIs spread easily from person to person, most often through coughing or sneezing. A URI will almost always get better in a week or 2 without any treatment. Because most URIs are caused by viruses, antibiotics do not usually help.  If you do have a bacterial infection, your doctor might prescribe antibiotics.  How do I care for myself at home? -- Ask the doctor or nurse what you should do when you go home. Make sure that you understand exactly what you need to do to care for yourself. Ask questions if there is anything you do not understand.  You should also:   Wash your hands often (figure 1), and cough or sneeze into a tissue. If you do not have a tissue, cough or sneeze into your " elbow instead of your hands.   Drink lots of fluids (water, juice, or broth) to stay hydrated, unless your doctor told you otherwise. This will help replace any fluids lost through runny nose or fever. Warm tea or soup can also help soothe a sore throat.   To help a stuffy nose and make it easier to breathe:   Use saline nose drops or spray.   Use a humidifier if the air in your home feels dry.   Follow the directions on the label carefully if you take over-the-counter cough or cold medicines. Do not take more than 1 medicine that contains acetaminophen. Also, if you have a heart problem or high blood pressure, check with your doctor before you take any of these medicines.   Try to quit smoking if you smoke. Your doctor or nurse can help.  How can I prevent getting another URI? -- The best way to prevent a URI, or keep it from spreading to others, is to keep your hands clean. Wash your hands often with soap and water or alcohol gel rubs.  Some other ways to prevent the spread of infection include:   Always wash your hands with soap and water after you cough, sneeze, or blow your nose.   Clean surfaces and objects that you touch a lot. These include sinks, counters, tables, door handles, remotes, and phones. Use a bleach and water mixture. The germs that cause a URI can live on surfaces for at least 2 hours.   Do not share cups, food, towels, bed linens, or other personal items.   Stay away from other people when you are sick. When you do need to be around other people, consider wearing a face mask.  When should I call the doctor? -- Call for advice if:   You have a persistent fever of 100.4°F (38°C) or higher, chills, a very bad sore throat, or ear or sinus pain.   You get a new fever after several days of feeling the same or getting better.   You start having chest pain when you cough.   You have a cough that lasts more than 10 days.   You cough up blood.   You have any new or worsening symptoms, such as worsening  cough or trouble breathing.  All topics are updated as new evidence becomes available and our peer review process is complete.  This topic retrieved from [employer] on: Mar 13, 2024.  Topic 305309 Version 1.0  Release: 32.2.4 - C32.71  © 2024 UpToDate, Inc. and/or its affiliates. All rights reserved.  figure 1: How to wash your hands     Wet your hands with clean water, and apply a small amount of soap. Lather and rub hands together for at least 20 seconds. Clean your wrists, palms, backs of your hands, between your fingers, tips of your fingers, thumbs, and under and around your nails. Rinse well, and dry your hands using a clean towel.  Graphic 669487 Version 7.0  Consumer Information Use and Disclaimer   Disclaimer: This generalized information is a limited summary of diagnosis, treatment, and/or medication information. It is not meant to be comprehensive and should be used as a tool to help the user understand and/or assess potential diagnostic and treatment options. It does NOT include all information about conditions, treatments, medications, side effects, or risks that may apply to a specific patient. It is not intended to be medical advice or a substitute for the medical advice, diagnosis, or treatment of a health care provider based on the health care provider's examination and assessment of a patient's specific and unique circumstances. Patients must speak with a health care provider for complete information about their health, medical questions, and treatment options, including any risks or benefits regarding use of medications. This information does not endorse any treatments or medications as safe, effective, or approved for treating a specific patient. UpToDate, Inc. and its affiliates disclaim any warranty or liability relating to this information or the use thereof.The use of this information is governed by the Terms of Use, available at  https://www.[employer]uwer.com/en/know/clinical-effectiveness-terms. 2024© [employer] and its affiliates and/or licensors. All rights reserved.  Copyright   © 2024 [employer] and/or its affiliates. All rights reserved.

## 2025-05-06 NOTE — PROGRESS NOTES
[facility]  Name: Cherelle Nugent      : 1995      MRN: [id number]  Encounter Provider: Juancho Wan PA-C  Encounter Date: 2025   Encounter department: [facility] [place]  :  Assessment & Plan  Upper respiratory tract infection, unspecified type    Orders:    amoxicillin-clavulanate (AUGMENTIN) 875-125 mg per tablet; Take 1 tablet by mouth every 12 (twelve) hours for 7 days    methylPREDNISolone 4 MG tablet therapy pack; Use as directed on package    azelastine (ASTELIN) 0.1 % nasal spray; 1 spray into each nostril 2 (two) times a day Use in each nostril as directed    benzonatate (TESSALON) 200 MG capsule; Take 1 capsule (200 mg total) by mouth 3 (three) times a day as needed for cough        Patient Instructions    If symptoms have not started to improve by Saturday fill prescription for Augmentin and start taking as directed  Take Medrol as directed until completed  Use Astelin as directed  Tessalon as needed for cough relief  Continue with Claritin-D  Continue with home medications as prescribed  Motrin and/or Tylenol as needed for any fever or pain  Follow up with PCP in 3-5 days.  Proceed to  ER if symptoms worsen.    If tests are performed, our office will contact you with results only if changes need to made to the care plan discussed with you at the visit. You can review your full results on [facility].    Chief Complaint:   Chief Complaint   Patient presents with    Cold Like Symptoms     Pt reports congestion, productive cough, clogged ears, fatigue, and body aches for 5 days      History of Present Illness   29-year-old female presents with head congestion runny nose sore throat cough productive cough plugged up ears.  Symptoms started last Saturday and has been waxing waning since then.  Was seen and evaluated for symptoms and throat swab and culture was performed.  Patient has been taking ibuprofen and Claritin-D with some Flonase for the past couple days  with minimal relief.  Denies any fevers but felt like she has had chills.  No chest pain shortness of breath but has some productive cough.  No abdominal pain nausea vomiting diarrhea.  No problems urinating.  Denies any diarrhea or constipation.    Sinusitis  This is a new problem. The current episode started in the past 7 days. The problem is unchanged. There has been no fever. The pain is moderate. Associated symptoms include chills, congestion, coughing, ear pain, sinus pressure and a sore throat. Pertinent negatives include no diaphoresis, hoarse voice or neck pain. Past treatments include oral decongestants, sitting up and lying down. The treatment provided no relief.         Review of Systems   Constitutional:  Positive for chills. Negative for diaphoresis.   HENT:  Positive for congestion, ear pain, postnasal drip, rhinorrhea, sinus pressure and sore throat. Negative for hoarse voice.    Respiratory:  Positive for cough.    Musculoskeletal:  Negative for neck pain.     Past Medical History   Past Medical History:   Diagnosis Date    Exercises two times per week     to 3x/week    Irritable bowel syndrome     Lipoma     Seasonal allergies      Past Surgical History:   Procedure Laterality Date    NO PAST SURGERIES      RI EXC B9 LESION MRGN XCP SK TG T/A/L 0.5 CM/< Right 6/16/2022    Procedure: CHEST WALL EXCISION;  Surgeon: Linda Muñiz MD;  Location: [facility] OR;  Service: General     Family History   Problem Relation Age of Onset    Aneurysm Father         cardiac    Heart disease Father         problem    Leukemia Brother         chronic lymphoid    Aneurysm Paternal Grandfather         cardiac     Throat cancer Family     Alcohol abuse Neg Hx     Substance Abuse Neg Hx     Mental illness Neg Hx      she reports that she has never smoked. She has never used smokeless tobacco. She reports current alcohol use. She reports that she does not use drugs.  Current Outpatient Medications   Medication  "Instructions    amoxicillin-clavulanate (AUGMENTIN) 875-125 mg per tablet 1 tablet, Oral, Every 12 hours scheduled    azelastine (ASTELIN) 0.1 % nasal spray 1 spray, Nasal, 2 times daily, Use in each nostril as directed    Balcoltra 0.1-20 MG-MCG(21) TABS Daily    benzonatate (TESSALON) 200 mg, Oral, 3 times daily PRN    Black Pepper-Turmeric (TURMERIC COMPLEX/BLACK PEPPER PO) Take by mouth With cayenne    cetirizine (ZYRTEC) 10 mg, Oral, Daily    fluticasone (FLONASE) 50 mcg/act nasal spray 2 sprays, Nasal, Daily    fluticasone (FLONASE) 50 mcg/act nasal spray 1 spray, Nasal, 2 times daily    guaiFENesin (ROBITUSSIN) 200 mg, Oral, 3 times daily PRN    loratadine (CLARITIN) 10 mg, As needed    methylPREDNISolone 4 MG tablet therapy pack Use as directed on package    Multiple Vitamin (multivitamin) tablet 1 tablet, Daily    Probiotic Product (PROBIOTIC DAILY PO) Take by mouth    promethazine-dextromethorphan (PHENERGAN-DM) 6.25-15 mg/5 mL oral syrup TAKE 5 ML (ORAL) 4 TIMES PER DAY AS NEEDED FOR COUGH FOR 7 DAYS    pseudoephedrine (SUDAFED) 120 mg, Oral, Every 12 hours    valACYclovir (VALTREX) 500 mg tablet As needed     Allergies   Allergen Reactions    Other Allergic Rhinitis     Seasonal         Objective   /66   Pulse 70   Temp 97.9 °F (36.6 °C) (Tympanic)   Resp 18   Ht 5' 1\" (1.549 m)   Wt 60.8 kg (134 lb)   SpO2 100%   BMI 25.32 kg/m²      Physical Exam  Vitals and nursing note reviewed.   Constitutional:       General: She is not in acute distress.     Appearance: Normal appearance. She is well-developed.   HENT:      Head: Normocephalic and atraumatic.      Right Ear: Hearing, tympanic membrane, ear canal and external ear normal. There is no impacted cerumen.      Left Ear: Hearing, tympanic membrane, ear canal and external ear normal. There is no impacted cerumen.      Nose: Congestion and rhinorrhea present.      Mouth/Throat:      Pharynx: Uvula midline. No oropharyngeal exudate or posterior " "oropharyngeal erythema.   Eyes:      General:         Right eye: No discharge.         Left eye: No discharge.      Conjunctiva/sclera: Conjunctivae normal.   Cardiovascular:      Rate and Rhythm: Normal rate and regular rhythm.      Heart sounds: Normal heart sounds. No murmur heard.  Pulmonary:      Effort: Pulmonary effort is normal. No respiratory distress.      Breath sounds: Normal breath sounds. No wheezing or rales.   Abdominal:      General: Bowel sounds are normal.      Palpations: Abdomen is soft.      Tenderness: There is no abdominal tenderness.   Musculoskeletal:         General: Normal range of motion.      Cervical back: Normal range of motion and neck supple.   Lymphadenopathy:      Cervical: No cervical adenopathy.   Skin:     General: Skin is warm and dry.   Neurological:      Mental Status: She is alert and oriented to person, place, and time.   Psychiatric:         Mood and Affect: Mood normal.         Portions of the record may have been created with voice recognition software.  Occasional wrong word or \"sound a like\" substitutions may have occurred due to the inherent limitations of voice recognition software.  Read the chart carefully and recognize, using context, where substitutions have occurred.  "

## (undated) DEVICE — NEEDLE HYPO 22G X 1-1/2 IN

## (undated) DEVICE — DRESSING MEPILEX AG 4 X 5 IN

## (undated) DEVICE — ADHESIVE SKIN HIGH VISCOSITY EXOFIN 1ML

## (undated) DEVICE — SCD SEQUENTIAL COMPRESSION COMFORT SLEEVE MEDIUM KNEE LENGTH: Brand: KENDALL SCD

## (undated) DEVICE — INTENDED FOR TISSUE SEPARATION, AND OTHER PROCEDURES THAT REQUIRE A SHARP SURGICAL BLADE TO PUNCTURE OR CUT.: Brand: BARD-PARKER SAFETY BLADES SIZE 15, STERILE

## (undated) DEVICE — GLOVE INDICATOR PI UNDERGLOVE SZ 6.5 BLUE

## (undated) DEVICE — DRAPE EQUIPMENT RF WAND

## (undated) DEVICE — SYRINGE 10ML LL

## (undated) DEVICE — 3M™ STERI-STRIP™ REINFORCED ADHESIVE SKIN CLOSURES, R1547, 1/2 IN X 4 IN (12 MM X 100 MM), 6 STRIPS/ENVELOPE: Brand: 3M™ STERI-STRIP™

## (undated) DEVICE — GAUZE SPONGES,16 PLY: Brand: CURITY

## (undated) DEVICE — ELECTRODE NEEDLE MOD E-Z CLEAN 2.75IN 7CM -0013M

## (undated) DEVICE — CHLORAPREP HI-LITE 26ML ORANGE

## (undated) DEVICE — BETHLEHEM UNIVERSAL MINOR GEN: Brand: CARDINAL HEALTH

## (undated) DEVICE — SUT MONOCRYL 4-0 PS-2 27 IN Y426H

## (undated) DEVICE — PLUMEPEN PRO 10FT

## (undated) DEVICE — 3M™ STERI-STRIP™ COMPOUND BENZOIN TINCTURE 40 BAGS/CARTON 4 CARTONS/CASE C1544: Brand: 3M™ STERI-STRIP™

## (undated) DEVICE — SUT VICRYL 3-0 SH 27 IN J416H

## (undated) DEVICE — GLOVE SRG BIOGEL 6.5

## (undated) DEVICE — 2000CC GUARDIAN II: Brand: GUARDIAN